# Patient Record
Sex: MALE | Race: OTHER | NOT HISPANIC OR LATINO | URBAN - METROPOLITAN AREA
[De-identification: names, ages, dates, MRNs, and addresses within clinical notes are randomized per-mention and may not be internally consistent; named-entity substitution may affect disease eponyms.]

---

## 2021-01-22 ENCOUNTER — EMERGENCY (EMERGENCY)
Facility: HOSPITAL | Age: 56
LOS: 1 days | Discharge: HOME | End: 2021-01-22
Attending: EMERGENCY MEDICINE
Payer: MEDICAID

## 2021-01-22 VITALS
WEIGHT: 154.98 LBS | HEIGHT: 65 IN | DIASTOLIC BLOOD PRESSURE: 83 MMHG | TEMPERATURE: 98 F | OXYGEN SATURATION: 98 % | HEART RATE: 103 BPM | RESPIRATION RATE: 16 BRPM | SYSTOLIC BLOOD PRESSURE: 150 MMHG

## 2021-01-22 LAB
ALBUMIN SERPL ELPH-MCNC: 4 G/DL — SIGNIFICANT CHANGE UP (ref 3.3–5)
ALP SERPL-CCNC: 78 U/L — SIGNIFICANT CHANGE UP (ref 40–120)
ALT FLD-CCNC: 33 U/L — SIGNIFICANT CHANGE UP (ref 10–45)
ANION GAP SERPL CALC-SCNC: 14 MMOL/L — SIGNIFICANT CHANGE UP (ref 5–17)
AST SERPL-CCNC: 15 U/L — SIGNIFICANT CHANGE UP (ref 10–40)
BASOPHILS # BLD AUTO: 0 K/UL — SIGNIFICANT CHANGE UP (ref 0–0.2)
BASOPHILS NFR BLD AUTO: 0 % — SIGNIFICANT CHANGE UP (ref 0–2)
BILIRUB SERPL-MCNC: 0.2 MG/DL — SIGNIFICANT CHANGE UP (ref 0.2–1.2)
BUN SERPL-MCNC: 12 MG/DL — SIGNIFICANT CHANGE UP (ref 7–23)
CALCIUM SERPL-MCNC: 9.5 MG/DL — SIGNIFICANT CHANGE UP (ref 8.4–10.5)
CHLORIDE SERPL-SCNC: 98 MMOL/L — SIGNIFICANT CHANGE UP (ref 96–108)
CO2 SERPL-SCNC: 22 MMOL/L — SIGNIFICANT CHANGE UP (ref 22–31)
CREAT SERPL-MCNC: 0.6 MG/DL — SIGNIFICANT CHANGE UP (ref 0.5–1.3)
EOSINOPHIL # BLD AUTO: 0.34 K/UL — SIGNIFICANT CHANGE UP (ref 0–0.5)
EOSINOPHIL NFR BLD AUTO: 2.3 % — SIGNIFICANT CHANGE UP (ref 0–6)
GLUCOSE SERPL-MCNC: 209 MG/DL — HIGH (ref 70–99)
HCT VFR BLD CALC: 44.9 % — SIGNIFICANT CHANGE UP (ref 39–50)
HGB BLD-MCNC: 15 G/DL — SIGNIFICANT CHANGE UP (ref 13–17)
LYMPHOCYTES # BLD AUTO: 25.4 % — SIGNIFICANT CHANGE UP (ref 13–44)
LYMPHOCYTES # BLD AUTO: 3.79 K/UL — HIGH (ref 1–3.3)
MANUAL SMEAR VERIFICATION: SIGNIFICANT CHANGE UP
MCHC RBC-ENTMCNC: 28.5 PG — SIGNIFICANT CHANGE UP (ref 27–34)
MCHC RBC-ENTMCNC: 33.4 GM/DL — SIGNIFICANT CHANGE UP (ref 32–36)
MCV RBC AUTO: 85.4 FL — SIGNIFICANT CHANGE UP (ref 80–100)
MONOCYTES # BLD AUTO: 0.81 K/UL — SIGNIFICANT CHANGE UP (ref 0–0.9)
MONOCYTES NFR BLD AUTO: 5.4 % — SIGNIFICANT CHANGE UP (ref 2–14)
NEUTROPHILS # BLD AUTO: 9.64 K/UL — HIGH (ref 1.8–7.4)
NEUTROPHILS NFR BLD AUTO: 64.6 % — SIGNIFICANT CHANGE UP (ref 43–77)
PLAT MORPH BLD: NORMAL — SIGNIFICANT CHANGE UP
PLATELET # BLD AUTO: 384 K/UL — SIGNIFICANT CHANGE UP (ref 150–400)
POTASSIUM SERPL-MCNC: 4.2 MMOL/L — SIGNIFICANT CHANGE UP (ref 3.5–5.3)
POTASSIUM SERPL-SCNC: 4.2 MMOL/L — SIGNIFICANT CHANGE UP (ref 3.5–5.3)
PROT SERPL-MCNC: 8.4 G/DL — HIGH (ref 6–8.3)
RBC # BLD: 5.26 M/UL — SIGNIFICANT CHANGE UP (ref 4.2–5.8)
RBC # FLD: 13.2 % — SIGNIFICANT CHANGE UP (ref 10.3–14.5)
RBC BLD AUTO: SIGNIFICANT CHANGE UP
SODIUM SERPL-SCNC: 134 MMOL/L — LOW (ref 135–145)
VARIANT LYMPHS # BLD: 2.3 % — SIGNIFICANT CHANGE UP (ref 0–6)
WBC # BLD: 14.93 K/UL — HIGH (ref 3.8–10.5)
WBC # FLD AUTO: 14.93 K/UL — HIGH (ref 3.8–10.5)

## 2021-01-22 PROCEDURE — 70450 CT HEAD/BRAIN W/O DYE: CPT | Mod: 26,MA

## 2021-01-22 PROCEDURE — 99283 EMERGENCY DEPT VISIT LOW MDM: CPT

## 2021-01-22 PROCEDURE — 99285 EMERGENCY DEPT VISIT HI MDM: CPT

## 2021-01-22 RX ORDER — METOCLOPRAMIDE HCL 10 MG
10 TABLET ORAL ONCE
Refills: 0 | Status: COMPLETED | OUTPATIENT
Start: 2021-01-22 | End: 2021-01-22

## 2021-01-22 RX ORDER — KETOROLAC TROMETHAMINE 30 MG/ML
15 SYRINGE (ML) INJECTION ONCE
Refills: 0 | Status: DISCONTINUED | OUTPATIENT
Start: 2021-01-22 | End: 2021-01-22

## 2021-01-22 RX ORDER — SODIUM CHLORIDE 9 MG/ML
1000 INJECTION, SOLUTION INTRAVENOUS ONCE
Refills: 0 | Status: COMPLETED | OUTPATIENT
Start: 2021-01-22 | End: 2021-01-22

## 2021-01-22 NOTE — ED PROVIDER NOTE - PATIENT PORTAL LINK FT
You can access the FollowMyHealth Patient Portal offered by [facility] by registering at the following website: [email or web address]. By joining [employer]’s FollowMyHealth portal, you will also be able to view your health information using other applications (apps) compatible with our system.

## 2021-01-22 NOTE — ED PROVIDER NOTE - PROGRESS NOTE DETAILS
CT showed bilat opacities possibly indicative of mastoiditis, ENT will see pt ENT saw pt ENT saw pt, want pt on abx and ENT f/u for possible mastoiditis

## 2021-01-22 NOTE — ED ADULT NURSE NOTE - CHPI ED NUR SYMPTOMS NEG
used no chills/no decreased eating/drinking/no dizziness/no fever/no nausea/no tingling/no vomiting/no weakness

## 2021-01-22 NOTE — ED PROVIDER NOTE - NS ED ROS FT
GENERAL: No fever, chills  EYES: no vision changes, no discharge.   ENT: no difficulty swallowing or speaking   CARDIAC: no chest pain/pressure, SOB, lower extremity swelling  PULMONARY: no cough, SOB  GI: no abdominal pain, n/v, +diarrhea  : no dysuria  SKIN: no rashes  NEURO: +headache, +lightheadedness, no paresthesia  MSK: No joint pain, myalgia, weakness.

## 2021-01-22 NOTE — ED PROVIDER NOTE - ATTENDING CONTRIBUTION TO CARE
Patient is a 54 yo M with no chronic medical problems here for evaluation of 2-3 weeks of persistent headache. He states he had COVID in December and has had headaches since then. He also c/o diarrhea. Denies focal weakness, fevers, chills, vomiting. The headache is not resolved with tylenol. He denies change in vision, focal weakness, difficulty walking. No family history of brain tumors or aneurysms.     VS noted  Gen. no acute distress, Non toxic   HEENT: PERRL, EOMI, mmm  Lungs: CTAB/L no C/ W /R   CVS: RRR   Abd; Soft non tender, non distended   Ext: no edema  Skin: no rash  Neuro AAOx3, CN 2-12 intact, strength 5/5 in UE/LE, gait normal, finger to nose normal, clear speech  a/p: persistent gradual intermittent headaches, unresolved by OTC medications at home. This may be residual from covid19. No fevers or neck pain to suggest meningitis. No suspicion for SAH. Will hydrate, check labs, give reglan, CT to r/o mass and reassess.   - Harpreet BRAN

## 2021-01-22 NOTE — ED ADULT NURSE NOTE - NSIMPLEMENTINTERV_GEN_ALL_ED
Implemented All Universal Safety Interventions:  [place] to call system. Call bell, personal items and telephone within reach. Instruct patient to call for assistance. Room bathroom lighting operational. Non-slip footwear when patient is off stretcher. Physically safe environment: no spills, clutter or unnecessary equipment. Stretcher in lowest position, wheels locked, appropriate side rails in place.

## 2021-01-22 NOTE — ED PROVIDER NOTE - PHYSICAL EXAMINATION
Dulce Thao MD  GENERAL: Patient awake alert NAD.  HEENT: NC/AT, Moist mucous membranes, PERRL, EOMI.  LUNGS: CTAB, no wheezes or crackles.   CARDIAC: RRR, no m/r/g.    ABDOMEN: Soft, NT, ND, No rebound, guarding. No CVA tenderness.   EXT: No edema. No calf tenderness.   MSK: No spinal tenderness, no pain with movement, no deformities.  NEURO: A&Ox3. Moving all extremities. cn 2-12 intact, cerebellar finger nose intact, 5/5 strength and sensation intact throughout.  SKIN: Warm and dry. No rash.  PSYCH: Normal affect. Dulce Thao MD  GENERAL: Patient awake alert NAD.  HEENT: NC/AT, Moist mucous membranes, PERRL, EOMI. Mild bilateral mastoid tenderness  LUNGS: CTAB, no wheezes or crackles.   CARDIAC: RRR, no m/r/g.    ABDOMEN: Soft, NT, ND, No rebound, guarding. No CVA tenderness.   EXT: No edema. No calf tenderness.   MSK: No spinal tenderness, no pain with movement, no deformities.  NEURO: A&Ox3. Moving all extremities. cn 2-12 intact, cerebellar finger nose intact, 5/5 strength and sensation intact throughout.  SKIN: Warm and dry. No rash.  PSYCH: Normal affect.

## 2021-01-22 NOTE — ED ADULT NURSE NOTE - OBJECTIVE STATEMENT
Pt is a 55y PMH DM2, HTN p/w headache, decreased appetite, and cough x 3 weeks. Pt endorses feeling as if he had covid in december. Recently tested negative but was started on abx for "wet cough." Pt reports diarrhea which which has now stopped. Neuro exam intact. Reports occasional chills. Denies SOB, N/V, numbness tingling, blurry vision, dysuria. Pt want covid antibody testing. A&Ox4, BRAMBILA, lungs clear, distal pulses intact, abdomen soft nontender, skin intact, VSS. Side rails up for safety, call bell and personal items within reach, instructed to call for assistance, verbalizes understanding. Will continue to monitor.

## 2021-01-22 NOTE — ED ADULT TRIAGE NOTE - CHIEF COMPLAINT QUOTE
headache x 2 weeks, slightly improved with Tylenol, diarrhea x 3 weeks, decreased appetite and occasional lightheadedness.  states he thinks he had covid in December and tested negative one week ago

## 2021-01-22 NOTE — ED PROVIDER NOTE - CARE PROVIDER_API CALL
Michelle Kinney (MD)  [facility]  [address] [place] [address] [address]  [place] [address]  Phone: [phone]  Fax: [phone]  Follow Up Time:

## 2021-01-22 NOTE — ED PROVIDER NOTE - OBJECTIVE STATEMENT
pt is a 56yo M who presents with headache. pt had COVID in december, most symptoms resolved except for headache and diarrhea for past 2-3 weeks. No neuro deficits, no changes in vision, no issues walking or talking. Endorses headache worst at back of head, constantly there despite tylenol

## 2021-01-22 NOTE — ED PROVIDER NOTE - NSFOLLOWUPINSTRUCTIONS_ED_ALL_ED_FT
You were seen in the ED for headache.     For pain, please take 650mg Tylenol every 6 hours as needed or 600mg ibuprofen every 8 hours as needed. Always take ibuprofen with food. You make take both Tylenol and ibuprofen as described above if one medication is not enough for your pain.    Please follow up with your primary care doctor in 2-3 days. Return to the ED if you experience any worsening or new symptoms or any symptoms that concern you, including fevers, chills, shortness of breath, chest pain You were seen in the ED for headache.   Your head CT showed signs suspicious of mastoiditis which is an infection behind your ear.     Take Augmentin 1 tab twice a day for 10 days. This is an antibiotic.    Follow up with the ears-nose-throat doctor in clinic in 10 days. Call to make an appointment.    For pain, please take 650mg Tylenol every 6 hours as needed or 600mg ibuprofen every 8 hours as needed. Always take ibuprofen with food. You make take both Tylenol and ibuprofen as described above if one medication is not enough for your pain.    Please follow up with your primary care doctor. Return to the ED if you experience any worsening or new symptoms or any symptoms that concern you, including fevers, chills, shortness of breath, chest pain, worsening headache, vision loss, hearing loss.

## 2021-01-22 NOTE — ED PROVIDER NOTE - CLINICAL SUMMARY MEDICAL DECISION MAKING FREE TEXT BOX
Master: pt is a 54yo M who presents with headache. Will get CT head to r/o hemorrhage or mass. Will check cbc and cmp for electrolyte imbalances. Will give reglan and LR and toradol Master: pt is a 54yo M who presents with headache. Will get CT head to r/o hemorrhage or mass. Will check cbc and cmp for electrolyte imbalances. Will give reglan and LR and Toradol pt is a 56yo M who presents with headache. Will get CT head to r/o mass. Will check cbc and cmp for electrolyte imbalances. Will give reglan and LR and Toradol    CT showed bilat opacities possibly indicative of mastoiditis, ENT saw patient, recommended Abd and outpatient follow up.

## 2021-01-23 VITALS
DIASTOLIC BLOOD PRESSURE: 62 MMHG | TEMPERATURE: 99 F | OXYGEN SATURATION: 99 % | HEART RATE: 78 BPM | RESPIRATION RATE: 18 BRPM | SYSTOLIC BLOOD PRESSURE: 117 MMHG

## 2021-01-23 DIAGNOSIS — H70.90 UNSPECIFIED MASTOIDITIS, UNSPECIFIED EAR: ICD-10-CM

## 2021-01-23 LAB
APPEARANCE UR: CLEAR — SIGNIFICANT CHANGE UP
BACTERIA # UR AUTO: 0 — SIGNIFICANT CHANGE UP
BILIRUB UR-MCNC: NEGATIVE — SIGNIFICANT CHANGE UP
COLOR SPEC: YELLOW — SIGNIFICANT CHANGE UP
DIFF PNL FLD: NEGATIVE — SIGNIFICANT CHANGE UP
EPI CELLS # UR: 0 /HPF — SIGNIFICANT CHANGE UP
GLUCOSE UR QL: NEGATIVE — SIGNIFICANT CHANGE UP
HYALINE CASTS # UR AUTO: 1 /LPF — SIGNIFICANT CHANGE UP (ref 0–2)
KETONES UR-MCNC: NEGATIVE — SIGNIFICANT CHANGE UP
LEUKOCYTE ESTERASE UR-ACNC: NEGATIVE — SIGNIFICANT CHANGE UP
NITRITE UR-MCNC: NEGATIVE — SIGNIFICANT CHANGE UP
PH UR: 6 — SIGNIFICANT CHANGE UP (ref 5–8)
PROT UR-MCNC: SIGNIFICANT CHANGE UP
RBC CASTS # UR COMP ASSIST: 2 /HPF — SIGNIFICANT CHANGE UP (ref 0–4)
SARS-COV-2 IGG SERPL QL IA: POSITIVE
SARS-COV-2 IGM SERPL IA-ACNC: 53.8 INDEX — HIGH
SP GR SPEC: 1.02 — SIGNIFICANT CHANGE UP (ref 1.01–1.02)
UROBILINOGEN FLD QL: NEGATIVE — SIGNIFICANT CHANGE UP
WBC UR QL: 1 /HPF — SIGNIFICANT CHANGE UP (ref 0–5)

## 2021-01-23 PROCEDURE — 87086 URINE CULTURE/COLONY COUNT: CPT

## 2021-01-23 PROCEDURE — 96375 TX/PRO/DX INJ NEW DRUG ADDON: CPT

## 2021-01-23 PROCEDURE — 86769 SARS-COV-2 COVID-19 ANTIBODY: CPT

## 2021-01-23 PROCEDURE — 81001 URINALYSIS AUTO W/SCOPE: CPT

## 2021-01-23 PROCEDURE — 99284 EMERGENCY DEPT VISIT MOD MDM: CPT | Mod: 25

## 2021-01-23 PROCEDURE — 80053 COMPREHEN METABOLIC PANEL: CPT

## 2021-01-23 PROCEDURE — 85025 COMPLETE CBC W/AUTO DIFF WBC: CPT

## 2021-01-23 PROCEDURE — 96374 THER/PROPH/DIAG INJ IV PUSH: CPT

## 2021-01-23 PROCEDURE — 70450 CT HEAD/BRAIN W/O DYE: CPT

## 2021-01-23 RX ADMIN — SODIUM CHLORIDE 1000 MILLILITER(S): 9 INJECTION, SOLUTION INTRAVENOUS at 01:01

## 2021-01-23 RX ADMIN — Medication 10 MILLIGRAM(S): at 01:01

## 2021-01-23 RX ADMIN — Medication 15 MILLIGRAM(S): at 01:07

## 2021-01-23 NOTE — CONSULT NOTE ADULT - PROBLEM SELECTOR RECOMMENDATION 9
- CT Head: Nonspecific bilateral mastoid opacification.   - Augmentin 875mg PO BID x 7-10days.   - Pain meds prn.   - F/u ENT clinic on discharge.    [address] [facility], [address]    [place], [place]- [address] [phone]  - Call ENT with questions.     # [id number]  ENT PA. - CT Head: Nonspecific bilateral mastoid opacification.   - Augmentin 875mg PO BID x 7-10days.   - Pain meds prn.   - F/u ENT clinic on discharge.    [address] [facility], [address]    [place]- [address] [phone]  - Call ENT with questions.     # [id number]  ENT PA.

## 2021-01-23 NOTE — CONSULT NOTE ADULT - ASSESSMENT
56 YO M with PMH of DM on Metformin, Depression presents to [facility] ED for evaluation of Headache x 2-3 weeks. Per pt, noted to have HA mostly on the back, constant doesn't relieve with Tylenol PO. Admits to having chills. In ED, No fevers, WBC: 14.93. CT of Head showed, Nonspecific bilateral mastoid opacification. Received Toradol x1 and pain improved per pt.

## 2021-01-23 NOTE — CONSULT NOTE ADULT - SUBJECTIVE AND OBJECTIVE BOX
CC: B/l Mastoiditis    HPI: 56 YO M with PMH of DM on Metformin, Depression presents to [facility] ED for evaluation of Headache x 2-3 weeks. Per pt, noted to have HA mostly on the back, constant doesn't relieve with Tylenol PO. Pt had COVID in December most symptoms resolved except for HA and Diarrhea for last 2-3 weeks. Admits to having chills. Denies ear pain, hearing loss, nystagmus vertigo, Dizziness/Blurry vision/syncope, fever, N/V, Abdominal Pain, Recent URI, rhinorrhea sore throat.     In ED, No fevers, WBC: 14.93. CT of Head showed, Nonspecific bilateral mastoid opacification. Received Toradol x1 and pain improved per pt.     PAST MEDICAL & SURGICAL HISTORY:  No pertinent past medical history  No significant past surgical history    Allergies.  No Known Allergies    Intolerances.  MEDICATIONS  (STANDING):.  MEDICATIONS  (PRN):.    Social History:   Family history: None.   ROS:   ENT: all negative except as noted in HPI.   CV: denies palpitations  Pulm: denies SOB, cough, hemoptysis  GI: denies change in appetite indigestion, n/v  : denies pertinent urinary symptoms, urgency  Neuro: denies numbness/tingling, loss of sensation  Psych: denies anxiety  MS: denies muscle weakness, instability  Heme: denies easy bruising or bleeding  Endo: denies heat/cold intolerance, excessive sweating  Vascular: denies LE edema    Vital Signs Last 24 Hrs  T(C): 37 (23 Jan 2021 02:08), Max: 37 (23 Jan 2021 02:08)  T(F): 98.6 (23 Jan 2021 02:08), Max: 98.6 (23 Jan 2021 02:08)  HR: 78 (23 Jan 2021 02:08) (78 - 103)  BP: 117/62 (23 Jan 2021 02:08) (117/62 - 150/83)  BP(mean): --  RR: 18 (23 Jan 2021 02:08) (16 - 18)  SpO2: 99% (23 Jan 2021 02:08) (97% - 99%)                        15.0   14.93 )-----------( 384      ( 22 Jan 2021 22:42 )             44.9    01-22    134<L>  |  98  |  12  ----------------------------<  209<H>  4.2   |  22  |  0.60    Ca    9.5      22 Jan 2021 22:42    TPro  8.4<H>  /  Alb  4.0  /  TBili  0.2  /  DBili  x   /  AST  15  /  ALT  33  /  AlkPhos  78  01-22     PHYSICAL EXAM:  Gen: NAD, On RA.   Skin: No rashes, bruises, or lesions  Head: Normocephalic, Atraumatic  Face: no edema, erythema, or fluctuance. Parotid glands soft without mass  Eyes: no scleral injection  Ears: Right - Cerumen in EAC, TM intact without effusion or erythema. No evidence of any fluid drainage. No mastoid tenderness, erythema, or ear bulging            Left - Cerumen in EAC, TM intact without effusion or erythema. No evidence of any fluid drainage. No mastoid tenderness, erythema, or ear bulging  Nose: Nares bilaterally patent, no discharge  Mouth: No Stridor / Drooling / Trismus.  Mucosa moist, tongue/uvula midline, oropharynx clear  Neck: Flat, supple, no lymphadenopathy, trachea midline, no masses  Lymphatic: No lymphadenopathy  Resp: breathing easily, no stridor  CV: no peripheral edema/cyanosis  GI: nondistended   Peripheral vascular: no JVD or edema  Neuro: facial nerve intact, no facial droop    IMAGING/ADDITIONAL STUDIES:   CT Head: IMPRESSION:  No acute intracranial hemorrhage, large cortical infarct or mass effect. If clinically indicated, short-term follow-up or MRI may be obtained for further evaluation.  Nonspecific bilateral mastoid opacification. Recommend clinical correlation to assess acute mastoiditis. CC: B/l Mastoiditis.    HPI: 56 YO M with PMH of DM on Metformin, Depression presents to [facility] ED for evaluation of Headache x 2-3 weeks. Per pt, noted to have HA mostly on the back, constant doesn't relieve with Tylenol PO. Pt had COVID in December most symptoms resolved except for HA and Diarrhea for last 2-3 weeks. Admits to having chills. Denies ear pain, hearing loss, nystagmus vertigo, Dizziness/Blurry vision/syncope, fever, N/V, Abdominal Pain, Recent URI, rhinorrhea, sore throat.     In ED, No fevers, WBC: 14.93. CT of Head showed, Nonspecific bilateral mastoid opacification. Received Toradol x1 and pain improved per pt.     PAST MEDICAL & SURGICAL HISTORY:  No pertinent past medical history  No significant past surgical history    Allergies.  No Known Allergies    Intolerances.  MEDICATIONS  (STANDING):.  MEDICATIONS  (PRN):.    Social History:   Family history: None.   ROS:   ENT: all negative except as noted in HPI.   CV: denies palpitations  Pulm: denies SOB, cough, hemoptysis  GI: denies change in appetite indigestion, n/v  : denies pertinent urinary symptoms, urgency  Neuro: denies numbness/tingling, loss of sensation  Psych: denies anxiety  MS: denies muscle weakness, instability  Heme: denies easy bruising or bleeding  Endo: denies heat/cold intolerance, excessive sweating  Vascular: denies LE edema    Vital Signs Last 24 Hrs  T(C): 37 (23 Jan 2021 02:08), Max: 37 (23 Jan 2021 02:08)  T(F): 98.6 (23 Jan 2021 02:08), Max: 98.6 (23 Jan 2021 02:08)  HR: 78 (23 Jan 2021 02:08) (78 - 103)  BP: 117/62 (23 Jan 2021 02:08) (117/62 - 150/83)  BP(mean): --  RR: 18 (23 Jan 2021 02:08) (16 - 18)  SpO2: 99% (23 Jan 2021 02:08) (97% - 99%)                        15.0   14.93 )-----------( 384      ( 22 Jan 2021 22:42 )             44.9    01-22    134<L>  |  98  |  12  ----------------------------<  209<H>  4.2   |  22  |  0.60    Ca    9.5      22 Jan 2021 22:42    TPro  8.4<H>  /  Alb  4.0  /  TBili  0.2  /  DBili  x   /  AST  15  /  ALT  33  /  AlkPhos  78  01-22     PHYSICAL EXAM:  Gen: NAD, On RA.   Skin: No rashes, bruises, or lesions  Head: Normocephalic, Atraumatic  Face: no edema, erythema, or fluctuance. Parotid glands soft without mass  Eyes: no scleral injection  Ears: Right - Cerumen in EAC, TM intact without effusion or erythema. No evidence of any fluid drainage. No mastoid tenderness, erythema, or ear bulging            Left - Cerumen in EAC, TM intact without effusion or erythema. No evidence of any fluid drainage. No mastoid tenderness, erythema, or ear bulging  Nose: Nares bilaterally patent, no discharge  Mouth: No Stridor / Drooling / Trismus.  Mucosa moist, tongue/uvula midline, oropharynx clear  Neck: Flat, supple, no lymphadenopathy, trachea midline, no masses  Lymphatic: No lymphadenopathy  Resp: breathing easily, no stridor  CV: no peripheral edema/cyanosis  GI: nondistended   Peripheral vascular: no JVD or edema  Neuro: facial nerve intact, no facial droop    IMAGING/ADDITIONAL STUDIES:   CT Head: IMPRESSION:  No acute intracranial hemorrhage, large cortical infarct or mass effect. If clinically indicated, short-term follow-up or MRI may be obtained for further evaluation.  Nonspecific bilateral mastoid opacification. Recommend clinical correlation to assess acute mastoiditis.

## 2021-01-24 LAB
CULTURE RESULTS: SIGNIFICANT CHANGE UP
SPECIMEN SOURCE: SIGNIFICANT CHANGE UP

## 2021-02-04 ENCOUNTER — INPATIENT (INPATIENT)
Facility: HOSPITAL | Age: 56
LOS: 6 days | Discharge: HOME HEALTH | DRG: 638 | End: 2021-02-11
Attending: INTERNAL MEDICINE | Admitting: INTERNAL MEDICINE
Payer: MEDICAID

## 2021-02-04 VITALS
WEIGHT: 154.98 LBS | HEIGHT: 65 IN | SYSTOLIC BLOOD PRESSURE: 163 MMHG | HEART RATE: 88 BPM | TEMPERATURE: 98 F | RESPIRATION RATE: 16 BRPM | OXYGEN SATURATION: 100 % | DIASTOLIC BLOOD PRESSURE: 96 MMHG

## 2021-02-04 LAB
ALBUMIN SERPL ELPH-MCNC: 3.3 G/DL — SIGNIFICANT CHANGE UP (ref 3.3–5)
ALP SERPL-CCNC: 196 U/L — HIGH (ref 40–120)
ALT FLD-CCNC: 214 U/L — HIGH (ref 10–45)
ANION GAP SERPL CALC-SCNC: 10 MMOL/L — SIGNIFICANT CHANGE UP (ref 5–17)
AST SERPL-CCNC: 322 U/L — HIGH (ref 10–40)
BASOPHILS # BLD AUTO: 0.12 K/UL — SIGNIFICANT CHANGE UP (ref 0–0.2)
BASOPHILS NFR BLD AUTO: 0.9 % — SIGNIFICANT CHANGE UP (ref 0–2)
BILIRUB SERPL-MCNC: 0.4 MG/DL — SIGNIFICANT CHANGE UP (ref 0.2–1.2)
BUN SERPL-MCNC: 16 MG/DL — SIGNIFICANT CHANGE UP (ref 7–23)
CALCIUM SERPL-MCNC: 8.9 MG/DL — SIGNIFICANT CHANGE UP (ref 8.4–10.5)
CHLORIDE SERPL-SCNC: 101 MMOL/L — SIGNIFICANT CHANGE UP (ref 96–108)
CO2 SERPL-SCNC: 26 MMOL/L — SIGNIFICANT CHANGE UP (ref 22–31)
CREAT SERPL-MCNC: 0.61 MG/DL — SIGNIFICANT CHANGE UP (ref 0.5–1.3)
EOSINOPHIL # BLD AUTO: 0.12 K/UL — SIGNIFICANT CHANGE UP (ref 0–0.5)
EOSINOPHIL NFR BLD AUTO: 0.9 % — SIGNIFICANT CHANGE UP (ref 0–6)
GIANT PLATELETS BLD QL SMEAR: PRESENT — SIGNIFICANT CHANGE UP
GLUCOSE SERPL-MCNC: 148 MG/DL — HIGH (ref 70–99)
HCT VFR BLD CALC: 40.9 % — SIGNIFICANT CHANGE UP (ref 39–50)
HGB BLD-MCNC: 13.4 G/DL — SIGNIFICANT CHANGE UP (ref 13–17)
LYMPHOCYTES # BLD AUTO: 28.7 % — SIGNIFICANT CHANGE UP (ref 13–44)
LYMPHOCYTES # BLD AUTO: 3.94 K/UL — HIGH (ref 1–3.3)
MANUAL SMEAR VERIFICATION: SIGNIFICANT CHANGE UP
MCHC RBC-ENTMCNC: 28.4 PG — SIGNIFICANT CHANGE UP (ref 27–34)
MCHC RBC-ENTMCNC: 32.8 GM/DL — SIGNIFICANT CHANGE UP (ref 32–36)
MCV RBC AUTO: 86.7 FL — SIGNIFICANT CHANGE UP (ref 80–100)
MONOCYTES # BLD AUTO: 0.59 K/UL — SIGNIFICANT CHANGE UP (ref 0–0.9)
MONOCYTES NFR BLD AUTO: 4.3 % — SIGNIFICANT CHANGE UP (ref 2–14)
NEUTROPHILS # BLD AUTO: 8.83 K/UL — HIGH (ref 1.8–7.4)
NEUTROPHILS NFR BLD AUTO: 64.3 % — SIGNIFICANT CHANGE UP (ref 43–77)
PLAT MORPH BLD: NORMAL — SIGNIFICANT CHANGE UP
PLATELET # BLD AUTO: 375 K/UL — SIGNIFICANT CHANGE UP (ref 150–400)
POTASSIUM SERPL-MCNC: 3.8 MMOL/L — SIGNIFICANT CHANGE UP (ref 3.5–5.3)
POTASSIUM SERPL-SCNC: 3.8 MMOL/L — SIGNIFICANT CHANGE UP (ref 3.5–5.3)
PROT SERPL-MCNC: 6.2 G/DL — SIGNIFICANT CHANGE UP (ref 6–8.3)
RBC # BLD: 4.72 M/UL — SIGNIFICANT CHANGE UP (ref 4.2–5.8)
RBC # FLD: 13.9 % — SIGNIFICANT CHANGE UP (ref 10.3–14.5)
RBC BLD AUTO: SIGNIFICANT CHANGE UP
SODIUM SERPL-SCNC: 137 MMOL/L — SIGNIFICANT CHANGE UP (ref 135–145)
VARIANT LYMPHS # BLD: 0.9 % — SIGNIFICANT CHANGE UP (ref 0–6)
WBC # BLD: 13.74 K/UL — HIGH (ref 3.8–10.5)
WBC # FLD AUTO: 13.74 K/UL — HIGH (ref 3.8–10.5)

## 2021-02-04 RX ORDER — IBUPROFEN 200 MG
600 TABLET ORAL ONCE
Refills: 0 | Status: COMPLETED | OUTPATIENT
Start: 2021-02-04 | End: 2021-02-04

## 2021-02-04 RX ADMIN — Medication 600 MILLIGRAM(S): at 21:23

## 2021-02-04 NOTE — ED PROVIDER NOTE - PHYSICAL EXAMINATION
Vital signs reviewed  GENERAL: Patient nontoxic appearing, NAD  HEAD: NCAT  EYES: Anicteric  ENT: +left TM erythema. +ttp over left mastoid, left tragus extending towards cheek and neck. no uvular swelling.   NECK: Supple, non tender  RESPIRATORY: Normal respiratory effort. CTA B/L. No wheezing, rales, rhonchi  CARDIOVASCULAR: Regular rate and rhythm  ABDOMEN: Soft. Nondistended. Nontender. No guarding or rebound. No CVA tenderness.  MUSCULOSKELETAL/EXTREMITIES: Brisk cap refill. 2+ radial pulses. No leg edema.  SKIN:  Warm and dry  NEURO: AAOx3. No gross FND.  PSYCHIATRIC: Cooperative. Affect appropriate. Vital signs reviewed  GENERAL: Patient nontoxic appearing, NAD  HEAD: NCAT  EYES: Anicteric  ENT: +left TM erythema. +ttp over left mastoid, left tragus extending towards cheek and neck. no uvular swelling.   NECK: Supple, non tender  RESPIRATORY: Normal respiratory effort. CTA B/L. No wheezing, rales, rhonchi  CARDIOVASCULAR: Regular rate and rhythm  ABDOMEN: Soft. Nondistended. Nontender. No guarding or rebound. No CVA tenderness.  MUSCULOSKELETAL/EXTREMITIES: Brisk cap refill. 2+ radial pulses. No leg edema.  SKIN:  Warm and dry  NEURO: AAOx3. No gross FND.  PSYCHIATRIC: Cooperative. Affect appropriate.  Attending Halley Benavides: Gen: NAD, heent: atrauamtic, eomi, perrla, mild left tonsilar erythema, ttp left temporal area, handling secreations, no trismus, uvula midline, neck; nttp,left cervical adenopathy, chest: nttp, no crepitus, cv: rrr, no murmurs, lungs: ctab, abd: soft, nontender, nondistended, no peritoneal signs, +BS, no guarding, ext: wwp, neg homans, skin: no rash, neuro: awake and alert, following commands, speech clear, sensation and strength intact, no focal deficits

## 2021-02-04 NOTE — ED PROVIDER NOTE - ATTENDING CONTRIBUTION TO CARE
Attending MD Halley Benavides:  I personally have seen and examined this patient.  Resident note reviewed and agree on plan of care and except where noted.  See HPI, PE, and MDM for details.

## 2021-02-04 NOTE — ED PROVIDER NOTE - OBJECTIVE STATEMENT
55 year old M presents to ED c/o worsening  L sided headache radiating to neck and ear. Pt was seen here 2 weeks ago and found to have mastoiditis on CT and DC'd home with Augmentin for 10 days and f/u with ENT next Monday but pain worsened and pt couldn't wait until then. Pt states he completed Abx course. Patient has been taking tylenol and toradol everyday without pain relief.   pt denies any fever, chills, dizziness, cp, cough, sob, abdominal pain, n/v/d, discharge from ear, trouble opening his mouth

## 2021-02-04 NOTE — ED PROVIDER NOTE - RAPID ASSESSMENT
55 year old M presents to ED c/o worsening  L sided headache radiating to neck and ear. Pt was seen here 2 weeks ago and found to have mastoiditis on CT and DC'd home with Augmentin and f/u with ENT next Monday but pain worsened and pt couldn't wait until then. Pt states he completed Abx course. Took Toradol and 3 extra strength Tylenol with no improvement.     Patient was seen as a tele QDOC patient. The patient will be seen and further worked up in the main emergency department and their care will be completed by the main emergency department team along with a thorough physical exam. Receiving team will follow up on labs, analgesia, any clinical imaging, reassess and disposition as clinically indicated, all decisions regarding the progression of care will be made at their discretion.     Scribe Statement: ANTHONY, Darnell Oakes, attest that this documentation has been prepared under the direction and in the presence of Anastasia Minor (PA) 55 year old M presents to ED c/o worsening  L sided headache radiating to neck and ear. Pt was seen here 2 weeks ago and found to have mastoiditis on CT and DC'd home with Augmentin and f/u with ENT next Monday but pain worsened and pt couldn't wait until then. Pt states he completed Abx course. Took Toradol and 3 extra strength Tylenol with no improvement.     Patient was seen as a tele QDOC patient. The patient will be seen and further worked up in the main emergency department and their care will be completed by the main emergency department team along with a thorough physical exam. Receiving team will follow up on labs, analgesia, any clinical imaging, reassess and disposition as clinically indicated, all decisions regarding the progression of care will be made at their discretion.     Scribe Statement: I, Darnell Oakes, attest that this documentation has been prepared under the direction and in the presence of Anastasia Minor (PA)    Rapid assessment by Anastasia Minor PA-C full eval to be performed in ED. Above documentation completed by scribe above. I was present for and agree with documentation.   Anastasia Minor PA-C

## 2021-02-04 NOTE — ED PROVIDER NOTE - CLINICAL SUMMARY MEDICAL DECISION MAKING FREE TEXT BOX
55 year old M presents to ED c/o worsening  L sided headache radiating to neck and ear. Pt was seen here 2 weeks ago and found to have mastoiditis on CT and DC'd home with Augmentin for 10 days and f/u with ENT next Monday but pain worsened and pt couldn't wait until then. vitals wnl, on exam patient has pain over left temporal area and around ear. no ttp over left mastoid or tragus. left OM erythematous. will assess for mastoiditis vs trigeminal neuralgia vs osteomyelitis. will treat pain and draw labs and reassess with ct. 55 year old M presents to ED c/o worsening  L sided headache radiating to neck and ear. Pt was seen here 2 weeks ago and found to have mastoiditis on CT and DC'd home with Augmentin for 10 days and f/u with ENT next Monday but pain worsened and pt couldn't wait until then. vitals wnl, on exam patient has pain over left temporal area and around ear. no ttp over left mastoid or tragus. left OM erythematous. will assess for mastoiditis vs trigeminal neuralgia vs osteomyelitis. will treat pain and draw labs and reassess with ct.  Attending Halley Benavides: 54 y/o male recently seen in the ed for headache with ct scans showing evidence of mastoid cell opacification and treated for possible mastoiditis on augment. pt reports no sig improvement and reports increased pain to that area. no jaw pain. does endorse a sore throat as well. upon arrival pt hemodynamically stable. clinically no evidence on phsyical exam of mastoiditis. no jaw ttp. consider possible trigeminal neuralgia vs temporal arteritis. no evidence aducens palsy and pt well appearing making central venous abscess less likely. no vision changes. will obtain ct scans to further evalaute, check inflammatory markers and re-eval. will pain control. 55 year old M presents to ED c/o worsening  L sided headache radiating to neck and ear. Pt was seen here 2 weeks ago and found to have mastoiditis on CT and DC'd home with Augmentin for 10 days and f/u with ENT next Monday but pain worsened and pt couldn't wait until then. vitals wnl, on exam patient has pain over left temporal area and around ear. no ttp over left mastoid or tragus. left OM erythematous. will assess for mastoiditis vs trigeminal neuralgia vs osteomyelitis. will treat pain and draw labs and reassess with ct.  Attending Halley Benavides: 56 y/o male recently seen in the ed for headache with ct scans showing evidence of mastoid cell opacification and treated for possible mastoiditis on augment. pt reports no sig improvement and reports increased pain to that area. no jaw pain. does endorse a sore throat as well. upon arrival pt hemodynamically stable. clinically no evidence on phsyical exam of mastoiditis. no jaw ttp. consider possible trigeminal neuralgia vs temporal arteritis. additoinally pt with h/o DM ,does report some hearing changes, pt previusly seen by ENT. consider possible fungal infection althgouh less liekly. no evidence aducens palsy and pt well appearing making central venous abscess less likely. no vision changes. will obtain ct scans to further evalaute, check inflammatory markers andlikely dw neuro vs ent.  re-eval. will pain control. 55 year old M presents to ED c/o worsening  L sided headache radiating to neck and ear. Pt was seen here 2 weeks ago and found to have mastoiditis on CT and DC'd home with Augmentin for 10 days and f/u with ENT next Monday but pain worsened and pt couldn't wait until then. vitals wnl, on exam patient has pain over left temporal area and around ear. no ttp over left mastoid or tragus. left OM erythematous. will assess for mastoiditis vs trigeminal neuralgia vs osteomyelitis. will treat pain and draw labs and reassess with ct.  Attending Halley Benavides: 56 y/o male recently seen in the ed for headache with ct scans showing evidence of mastoid cell opacification and treated for possible mastoiditis on augment. pt reports no sig improvement and reports increased pain to that area. no jaw pain. does endorse a sore throat as well. upon arrival pt hemodynamically stable. clinically no evidence on phsyical exam of mastoiditis. no jaw ttp. consider possible trigeminal neuralgia vs temporal arteritis. additoinally pt with h/o DM ,does report some hearing changes, pt previusly seen by ENT. consider possible fungal infection althgouh less liekly vs spreading infection from prior eat infection. no evidence aducens palsy and pt well appearing making central venous abscess less likely. no vision changes. will obtain ct scans if unremarkable consider MRI to further evaluate. check inflammatory markers andlikely dw neuro and ent.  re-eval. will pain control.

## 2021-02-04 NOTE — ED PROVIDER NOTE - PROGRESS NOTE DETAILS
Mahad Villatoro PGY-1 patient continues to be in pain. endorsing pain over left temporal pain. needing multiple doses of pain meds. will consult neurology for further recs. Attending Halley Benavides: d/w neurology as concern for possible temporal arterities with persistent pain and temporal ttp. ct scans negative for acute process. clinical exam atypical for gely. will d/w neuro. awaiting esr. no visoin changes Resident Alvino: pt received as a sign out at 7a. Pending neurology recommendations at that time - IV steroids were ordered. Neurology called and recommended oral prednisone and then advised to hold off for now. Neurology resident called back with recommendations at 14:00 - awaiting note. Team came to see the patietn with attending at 14:20 and the patient was not in the ED - called the pt states he went to his car. Asked to come to the ED immediately. Pt is back now. Resident Hersimran: CTA/CTV ordered and pending (CT tech is called - has two more patients). ENT consulted as per neuro recs - note reviewed - ordered drops. Spoke with CDU team - accepted the patient.

## 2021-02-05 DIAGNOSIS — H92.02 OTALGIA, LEFT EAR: ICD-10-CM

## 2021-02-05 LAB
ALBUMIN SERPL ELPH-MCNC: 3.6 G/DL — SIGNIFICANT CHANGE UP (ref 3.3–5)
ALP SERPL-CCNC: 61 U/L — SIGNIFICANT CHANGE UP (ref 40–120)
ALT FLD-CCNC: 28 U/L — SIGNIFICANT CHANGE UP (ref 10–45)
ANION GAP SERPL CALC-SCNC: 10 MMOL/L — SIGNIFICANT CHANGE UP (ref 5–17)
APAP SERPL-MCNC: <15 UG/ML — SIGNIFICANT CHANGE UP (ref 10–30)
AST SERPL-CCNC: 18 U/L — SIGNIFICANT CHANGE UP (ref 10–40)
BASE EXCESS BLDV CALC-SCNC: -0.8 MMOL/L — SIGNIFICANT CHANGE UP (ref -2–2)
BASE EXCESS BLDV CALC-SCNC: -1 MMOL/L — SIGNIFICANT CHANGE UP (ref -2–2)
BILIRUB SERPL-MCNC: 0.2 MG/DL — SIGNIFICANT CHANGE UP (ref 0.2–1.2)
BUN SERPL-MCNC: 7 MG/DL — SIGNIFICANT CHANGE UP (ref 7–23)
CA-I SERPL-SCNC: 1.18 MMOL/L — SIGNIFICANT CHANGE UP (ref 1.12–1.3)
CA-I SERPL-SCNC: 1.21 MMOL/L — SIGNIFICANT CHANGE UP (ref 1.12–1.3)
CALCIUM SERPL-MCNC: 9.2 MG/DL — SIGNIFICANT CHANGE UP (ref 8.4–10.5)
CHLORIDE BLDV-SCNC: 108 MMOL/L — SIGNIFICANT CHANGE UP (ref 96–108)
CHLORIDE BLDV-SCNC: 109 MMOL/L — HIGH (ref 96–108)
CHLORIDE SERPL-SCNC: 106 MMOL/L — SIGNIFICANT CHANGE UP (ref 96–108)
CK SERPL-CCNC: 64 U/L — SIGNIFICANT CHANGE UP (ref 30–200)
CO2 BLDV-SCNC: 26 MMOL/L — SIGNIFICANT CHANGE UP (ref 22–30)
CO2 BLDV-SCNC: 26 MMOL/L — SIGNIFICANT CHANGE UP (ref 22–30)
CO2 SERPL-SCNC: 24 MMOL/L — SIGNIFICANT CHANGE UP (ref 22–31)
CREAT SERPL-MCNC: 0.64 MG/DL — SIGNIFICANT CHANGE UP (ref 0.5–1.3)
ETHANOL SERPL-MCNC: SIGNIFICANT CHANGE UP MG/DL (ref 0–10)
GAS PNL BLDV: 136 MMOL/L — SIGNIFICANT CHANGE UP (ref 135–145)
GAS PNL BLDV: 137 MMOL/L — SIGNIFICANT CHANGE UP (ref 135–145)
GAS PNL BLDV: SIGNIFICANT CHANGE UP
GLUCOSE BLDV-MCNC: 108 MG/DL — HIGH (ref 70–99)
GLUCOSE BLDV-MCNC: 128 MG/DL — HIGH (ref 70–99)
GLUCOSE SERPL-MCNC: 111 MG/DL — HIGH (ref 70–99)
HCO3 BLDV-SCNC: 24 MMOL/L — SIGNIFICANT CHANGE UP (ref 21–29)
HCO3 BLDV-SCNC: 25 MMOL/L — SIGNIFICANT CHANGE UP (ref 21–29)
HCT VFR BLDA CALC: 40 % — SIGNIFICANT CHANGE UP (ref 39–50)
HCT VFR BLDA CALC: 46 % — SIGNIFICANT CHANGE UP (ref 39–50)
HGB BLD CALC-MCNC: 13.1 G/DL — SIGNIFICANT CHANGE UP (ref 13–17)
HGB BLD CALC-MCNC: 15 G/DL — SIGNIFICANT CHANGE UP (ref 13–17)
HIV 1 & 2 AB SERPL IA.RAPID: SIGNIFICANT CHANGE UP
LACTATE BLDV-MCNC: 2.1 MMOL/L — HIGH (ref 0.7–2)
LACTATE BLDV-MCNC: 2.5 MMOL/L — HIGH (ref 0.7–2)
LIDOCAIN IGE QN: 27 U/L — SIGNIFICANT CHANGE UP (ref 7–60)
OTHER CELLS CSF MANUAL: 10 ML/DL — LOW (ref 18–22)
OTHER CELLS CSF MANUAL: 11 ML/DL — LOW (ref 18–22)
PCO2 BLDV: 45 MMHG — SIGNIFICANT CHANGE UP (ref 35–50)
PCO2 BLDV: 47 MMHG — SIGNIFICANT CHANGE UP (ref 35–50)
PH BLDV: 7.34 — LOW (ref 7.35–7.45)
PH BLDV: 7.35 — SIGNIFICANT CHANGE UP (ref 7.35–7.45)
PO2 BLDV: 29 MMHG — SIGNIFICANT CHANGE UP (ref 25–45)
PO2 BLDV: 31 MMHG — SIGNIFICANT CHANGE UP (ref 25–45)
POTASSIUM BLDV-SCNC: 4 MMOL/L — SIGNIFICANT CHANGE UP (ref 3.5–5.3)
POTASSIUM BLDV-SCNC: 4.3 MMOL/L — SIGNIFICANT CHANGE UP (ref 3.5–5.3)
POTASSIUM SERPL-MCNC: 4.3 MMOL/L — SIGNIFICANT CHANGE UP (ref 3.5–5.3)
POTASSIUM SERPL-SCNC: 4.3 MMOL/L — SIGNIFICANT CHANGE UP (ref 3.5–5.3)
PROT SERPL-MCNC: 7.1 G/DL — SIGNIFICANT CHANGE UP (ref 6–8.3)
RAPID RVP RESULT: SIGNIFICANT CHANGE UP
S PYO AG SPEC QL IA: NEGATIVE — SIGNIFICANT CHANGE UP
SALICYLATES SERPL-MCNC: <2 MG/DL — LOW (ref 15–30)
SAO2 % BLDV: 54 % — LOW (ref 67–88)
SAO2 % BLDV: 57 % — LOW (ref 67–88)
SARS-COV-2 RNA SPEC QL NAA+PROBE: SIGNIFICANT CHANGE UP
SODIUM SERPL-SCNC: 140 MMOL/L — SIGNIFICANT CHANGE UP (ref 135–145)

## 2021-02-05 PROCEDURE — 99218: CPT

## 2021-02-05 PROCEDURE — 70496 CT ANGIOGRAPHY HEAD: CPT | Mod: 26,MG

## 2021-02-05 PROCEDURE — 70460 CT HEAD/BRAIN W/DYE: CPT | Mod: 26,MA,59

## 2021-02-05 PROCEDURE — 71045 X-RAY EXAM CHEST 1 VIEW: CPT | Mod: 26

## 2021-02-05 PROCEDURE — 70491 CT SOFT TISSUE NECK W/DYE: CPT | Mod: 26,MA

## 2021-02-05 PROCEDURE — G1004: CPT

## 2021-02-05 RX ORDER — DEXTROSE 50 % IN WATER 50 %
25 SYRINGE (ML) INTRAVENOUS ONCE
Refills: 0 | Status: DISCONTINUED | OUTPATIENT
Start: 2021-02-05 | End: 2021-02-11

## 2021-02-05 RX ORDER — SODIUM CHLORIDE 9 MG/ML
1000 INJECTION, SOLUTION INTRAVENOUS ONCE
Refills: 0 | Status: COMPLETED | OUTPATIENT
Start: 2021-02-05 | End: 2021-02-05

## 2021-02-05 RX ORDER — MORPHINE SULFATE 50 MG/1
4 CAPSULE, EXTENDED RELEASE ORAL ONCE
Refills: 0 | Status: DISCONTINUED | OUTPATIENT
Start: 2021-02-05 | End: 2021-02-05

## 2021-02-05 RX ORDER — SODIUM CHLORIDE 9 MG/ML
1000 INJECTION, SOLUTION INTRAVENOUS
Refills: 0 | Status: DISCONTINUED | OUTPATIENT
Start: 2021-02-05 | End: 2021-02-11

## 2021-02-05 RX ORDER — ACETAMINOPHEN 500 MG
650 TABLET ORAL ONCE
Refills: 0 | Status: COMPLETED | OUTPATIENT
Start: 2021-02-05 | End: 2021-02-05

## 2021-02-05 RX ORDER — GLUCAGON INJECTION, SOLUTION 0.5 MG/.1ML
1 INJECTION, SOLUTION SUBCUTANEOUS ONCE
Refills: 0 | Status: DISCONTINUED | OUTPATIENT
Start: 2021-02-05 | End: 2021-02-11

## 2021-02-05 RX ORDER — DEXTROSE 50 % IN WATER 50 %
12.5 SYRINGE (ML) INTRAVENOUS ONCE
Refills: 0 | Status: DISCONTINUED | OUTPATIENT
Start: 2021-02-05 | End: 2021-02-11

## 2021-02-05 RX ORDER — KETOROLAC TROMETHAMINE 30 MG/ML
30 SYRINGE (ML) INJECTION ONCE
Refills: 0 | Status: DISCONTINUED | OUTPATIENT
Start: 2021-02-05 | End: 2021-02-05

## 2021-02-05 RX ORDER — SIMVASTATIN 20 MG/1
20 TABLET, FILM COATED ORAL AT BEDTIME
Refills: 0 | Status: DISCONTINUED | OUTPATIENT
Start: 2021-02-05 | End: 2021-02-11

## 2021-02-05 RX ORDER — METOCLOPRAMIDE HCL 10 MG
10 TABLET ORAL ONCE
Refills: 0 | Status: COMPLETED | OUTPATIENT
Start: 2021-02-05 | End: 2021-02-05

## 2021-02-05 RX ORDER — INSULIN LISPRO 100/ML
VIAL (ML) SUBCUTANEOUS
Refills: 0 | Status: DISCONTINUED | OUTPATIENT
Start: 2021-02-05 | End: 2021-02-11

## 2021-02-05 RX ORDER — HYDROCORTISONE 20 MG
125 TABLET ORAL ONCE
Refills: 0 | Status: DISCONTINUED | OUTPATIENT
Start: 2021-02-05 | End: 2021-02-05

## 2021-02-05 RX ORDER — ACETAMINOPHEN 500 MG
975 TABLET ORAL EVERY 6 HOURS
Refills: 0 | Status: DISCONTINUED | OUTPATIENT
Start: 2021-02-05 | End: 2021-02-07

## 2021-02-05 RX ORDER — ACETAMINOPHEN 500 MG
975 TABLET ORAL ONCE
Refills: 0 | Status: COMPLETED | OUTPATIENT
Start: 2021-02-05 | End: 2021-02-05

## 2021-02-05 RX ORDER — LISINOPRIL 2.5 MG/1
5 TABLET ORAL DAILY
Refills: 0 | Status: DISCONTINUED | OUTPATIENT
Start: 2021-02-05 | End: 2021-02-11

## 2021-02-05 RX ORDER — MAGNESIUM SULFATE 500 MG/ML
2 VIAL (ML) INJECTION ONCE
Refills: 0 | Status: COMPLETED | OUTPATIENT
Start: 2021-02-05 | End: 2021-02-05

## 2021-02-05 RX ORDER — OFLOXACIN 0.3 %
5 DROPS OPHTHALMIC (EYE)
Refills: 0 | Status: DISCONTINUED | OUTPATIENT
Start: 2021-02-05 | End: 2021-02-11

## 2021-02-05 RX ORDER — DEXTROSE 50 % IN WATER 50 %
15 SYRINGE (ML) INTRAVENOUS ONCE
Refills: 0 | Status: DISCONTINUED | OUTPATIENT
Start: 2021-02-05 | End: 2021-02-11

## 2021-02-05 RX ORDER — KETOROLAC TROMETHAMINE 30 MG/ML
15 SYRINGE (ML) INJECTION EVERY 6 HOURS
Refills: 0 | Status: DISCONTINUED | OUTPATIENT
Start: 2021-02-05 | End: 2021-02-10

## 2021-02-05 RX ORDER — KETOROLAC TROMETHAMINE 30 MG/ML
15 SYRINGE (ML) INJECTION ONCE
Refills: 0 | Status: DISCONTINUED | OUTPATIENT
Start: 2021-02-05 | End: 2021-02-05

## 2021-02-05 RX ADMIN — Medication 50 GRAM(S): at 06:59

## 2021-02-05 RX ADMIN — Medication 1: at 22:04

## 2021-02-05 RX ADMIN — SODIUM CHLORIDE 1000 MILLILITER(S): 9 INJECTION, SOLUTION INTRAVENOUS at 04:32

## 2021-02-05 RX ADMIN — Medication 15 MILLIGRAM(S): at 06:59

## 2021-02-05 RX ADMIN — Medication 30 MILLIGRAM(S): at 20:40

## 2021-02-05 RX ADMIN — Medication 5 DROP(S): at 18:35

## 2021-02-05 RX ADMIN — Medication 650 MILLIGRAM(S): at 06:05

## 2021-02-05 RX ADMIN — SODIUM CHLORIDE 1000 MILLILITER(S): 9 INJECTION, SOLUTION INTRAVENOUS at 06:04

## 2021-02-05 RX ADMIN — Medication 10 MILLIGRAM(S): at 05:14

## 2021-02-05 RX ADMIN — MORPHINE SULFATE 4 MILLIGRAM(S): 50 CAPSULE, EXTENDED RELEASE ORAL at 02:10

## 2021-02-05 RX ADMIN — Medication 650 MILLIGRAM(S): at 14:35

## 2021-02-05 RX ADMIN — MORPHINE SULFATE 4 MILLIGRAM(S): 50 CAPSULE, EXTENDED RELEASE ORAL at 16:17

## 2021-02-05 NOTE — ED CDU PROVIDER INITIAL DAY NOTE - ATTENDING CONTRIBUTION TO CARE
Attending MD Mcmillan:    Physician assistant note reviewed and agree on plan of care and except where noted.  See HPI, PE, and MDM for details.

## 2021-02-05 NOTE — CONSULT NOTE ADULT - ASSESSMENT
INCOMPLETE  Assessment:  55y ***-handed M with h/o *** No pertinent past medical history     p/w    On exam, he has     IMPRESSION:    Plan  []  []  []    Assessment and plan discussed/not discussed with the attending, Dr. Grant Doctor    Rolo Riggins, DO  PGY-3 Neurology Service INCOMPLETE  Assessment:  55y R-handed M with h/o HTN and DM2 p/w worsening daily HA over the left temporal region, No pertinent past medical historyNo acute intracranial hemorrhage, large cortical infarct or mass effect. If clinically indicated, short-term follow-up or MRI may be obtained for further evaluation.    Nonspecific bilateral mastoid opacification. Recommend clinical correlation to assess acute mastoiditis.         p/w    On exam, he has     IMPRESSION:    Plan  []  []  []    Assessment and plan discussed/not discussed with the attending, Dr. Grant Doctor    Rolo Riggins, DO  PGY-3 Neurology Service INCOMPLETE  Assessment:  55y R-handed M with h/o HTN and DM2 p/w worsening daily intermittent 5-10 throbbing left temporal HA worse with valsalva maneuvers a/w L. ear tenderness and L. ear hearing loss and recent suspected mastoiditis s/p augmentin concerning for exacerbation of his prior mastoiditis vs. increased ICP secondary to IIH vs. infectious.     Nonspecific bilateral mastoid opacification. Recommend clinical correlation to assess acute mastoiditis.         p/w    On exam, he has     IMPRESSION:    Plan  []  []  []    Assessment and plan discussed/not discussed with the attending, Dr. Grant Doctor    Rolo Riggins, DO  PGY-3 Neurology Service INCOMPLETE  Assessment:  55y R-handed M with h/o HTN and DM2 p/w worsening daily intermittent 5-10 throbbing left temporal HA worse with valsalva maneuvers a/w L. ear tenderness and L. ear hearing loss and recent suspected mastoiditis s/p augmentin concerning for exacerbation of his prior mastoiditis vs. increased ICP secondary to IIH vs. VST vs. infection     On exam, he has     IMPRESSION:    Plan  []CDU for further eval and testing  []CTA/CTV now to evaluate for  []MRI brain w/wo contrast with thin cuts through the IACs and cavernous sinus.   []Test serum HIV, HSV1/2, HHV6, serum rhizopus, HCV, HBV, VZV serum   []Re-consult ENT   []Until CTA/CTV negative for acute thrombosis would avoid NSAIDs.  Can use low dose tylenol 500mg q8h until then.  If negative then can add toradol 15mg q8h IV can increase to 30 mg q8h IV.     Rolo Riggins, DO  PGY-3 Neurology Service

## 2021-02-05 NOTE — ED CDU PROVIDER INITIAL DAY NOTE - OBJECTIVE STATEMENT
55 year old M presents to ED c/o worsening  L sided headache radiating to neck and ear. Pt was seen here 2 weeks ago and found to have mastoiditis on CT and DC'd home with Augmentin for 10 days and f/u with ENT next Monday but pain worsened and pt couldn't wait until then. Pt states he completed Abx course. Patient has been taking tylenol and toradol everyday without pain relief.   pt denies any fever, chills, dizziness, cp, cough, sob, abdominal pain, n/v/d, discharge from ear, trouble opening his mouth 55 year old M presents to ED c/o worsening  L sided headache radiating to neck and ear. Pt was seen here 2 weeks ago and found to have mastoiditis on CT and DC'd home with Augmentin for 10 days and f/u with ENT next Monday but pain worsened and pt couldn't wait until then. Pt states he completed Abx course. Patient has been taking tylenol and toradol everyday without pain relief.   pt denies any fever, chills, dizziness, cp, cough, sob, abdominal pain, n/v/d, discharge from ear, trouble opening his mouth    In ED pt has unremarkable labs, CTA H/n remarkable for partial opacification of L middle ear and mastoid cells, Neuro recommending MRI w/w/o contrast with thin cuts through IAC/ cavernous sinus to better evaluate for osteo/mastoiditis/ Central venous thrombosis. Less concerned for GCA per neruo, ENT recommended ofloxacin drops to the left ear, concerned for chronic process, pain controlled with toradol, sent to CDU for MRI

## 2021-02-05 NOTE — CONSULT NOTE ADULT - PROBLEM SELECTOR RECOMMENDATION 9
- F/u neuro recs and MRI   - Plan for CDU overnight   - If patient is able to go home this weekend, he will follow up with ear specialist Dr. Macias as an outpatient on Monday   - ENT will continue to follow  - Call with questions or concerns - Floxin gtt 5 drops BID x7days to the left ear  - F/u neuro recs and MRI   - Plan for CDU overnight   - If patient is able to go home this weekend, he will follow up with ear specialist Dr. Macias as an outpatient on Monday   - ENT will continue to follow  - Call with questions or concerns

## 2021-02-05 NOTE — ED ADULT NURSE NOTE - OBJECTIVE STATEMENT
55 yr old male arrived to the ED from home c/o headache and L ear ache x6 weeks 55 yr old male arrived to the ED from home c/o headache and L ear ache x6 weeks. pt states the pain worsened over the last 2 weeks and the headache is over the L side of his head. pt endorses taking 1,500mg every 6 hours for the past few days. HX DM, HTN, HDL. upon assessment pt is A&ox4, speaking clearly, answering questions and following commands. pt is ambulatory with a steady gait. 4mm PERRLA. lungs clear jhoan. respirations are even and unlabored. pt denies abd pain, fever, chills, blurry or double vision

## 2021-02-05 NOTE — CONSULT NOTE ADULT - ASSESSMENT
55 year old M presents to ED c/o worsening L sided headache radiating to neck and ear. Last seen in ED on 1/23 and found to have acute left sided mastoiditis, d/c'd with augmentin and toradol and told to follow up as an outpatient with ENT this monday however he returns with persistent left ear pain. On exam pt noted to have B/L TM retractions and thickening with scant clear otorrhea on the left side. This is likely 2/2 chronic ear disease / eustachian tube dysfunction. Repeat CT head/neck described above.  55 year old M presents to ED c/o worsening L sided headache radiating to neck and ear. Last seen in ED on 1/23 and found to have acute left sided mastoiditis, d/c'd with augmentin and toradol and told to follow up as an outpatient with ENT this monday however he returns with persistent left ear pain. On exam pt noted to have B/L TM retractions and thickening with scant clear otorrhea on the left side. This is likely 2/2 chronic ear disease. Repeat CT head/neck described above.

## 2021-02-05 NOTE — ED ADULT NURSE REASSESSMENT NOTE - NS ED NURSE REASSESS COMMENT FT1
At 1345 pt can't be found in ED. MD Wallace called patient's cell phone, pt states he's in parking lot, ready to leave the hospital. Was advised to come back to ED for neurologists are waiting for him. Pt came back after 10 minutes. States I'd been here since last night, I can't wait anymore." Emotional support given. Pt made aware that it's  very important that neurologist that he will be seen by neurologist, verbalized understanding. Will continue to reassess. At 1345 pt can't be found in ED. MD Wallace called patient's cell phone, pt states he's in parking lot, ready to leave the hospital. Was advised to come back to ED for neurologists are waiting for him. Pt came back after 10 minutes. States I'd been here since last night, I can't wait anymore." Emotional support given. Pt made aware that it's  very important  that he will be seen by neurologist, verbalized understanding. Will continue to reassess.

## 2021-02-05 NOTE — ED CDU PROVIDER INITIAL DAY NOTE - PROGRESS NOTE DETAILS
Spoke with neuro resident, discussed CTA results. plan to continue with plan fot CDU fro pain control and MRI of Brain w/w/o, thin cuts through IAC and cavernous sinus, low susp. for GCA, no need for steroids.

## 2021-02-05 NOTE — ED ADULT NURSE REASSESSMENT NOTE - NS ED NURSE REASSESS COMMENT FT1
Received pt from DARIAN Shah , received pt alert and responsive, oriented x4, denies any respiratory distress, SOB, or difficulty breathing. Pt transferred to CDU for headache currently c/o 1/10 pain at this time. Pending MRI in AM pt aware of plan.  IV in place, patent and free of signs of infiltration,  pt denies chest pain or palpitations, V/S stable, pt afebrile, Pt educated on unit and unit rules, instructed patient to notify RN of any needed assistance, Pt verbalizes understanding, Call bell placed within reach. Safety maintained. Will continue to monitor.

## 2021-02-05 NOTE — CONSULT NOTE ADULT - SUBJECTIVE AND OBJECTIVE BOX
55y R-handed man with a PMH of  who presented on 02-04 with a CC of  being evaluated for headache.  HA first started *** and was *** in onset.  He describes the HA as *quality* *location* ***/10 which do *** cause him to miss work/school.  PT admits/denies associated ***aura, photophobia, phonophobia, nausea, vomitting, confusion, dizziness, light-headedness.  The HA does *** change with position, sneezing, coughing or BM.  Exacerbating factors include ***.   He does *** have a prior HA history and has *** required inpatient treatment of headache in the past. Currently, his frequency of  HA is ***.     Home Headache and Pain Treatments:   Current Abortive: ***  Current Prophylactic: ***  Current caffeine use: ***  Headache triggers:   Previously failed medications: ***  Previously effective abortive IV medications: ***  Previous procedures for headache treatment:    NEUROLOGY CONSULT   HPI: RAY FISCHER is a 55y ***-handed man with a PMH of *** who presented on 02-04-21 with .      ROS: A 10-system ROS was performed and is negative except for those items noted above.     PMH:  HTN  HLD   Tobacco abuse     Home Medications:    MEDICATIONS  (STANDING):    MEDICATIONS  (PRN):      ALLERGIES: No Known Allergies      PSH:   No significant past surgical history        Social History:    FH:      OBJECTIVE  Vital Signs Last 24 Hrs  T(C): 36.9 (05 Feb 2021 11:33), Max: 36.9 (04 Feb 2021 20:16)  T(F): 98.5 (05 Feb 2021 11:33), Max: 98.5 (05 Feb 2021 11:33)  HR: 63 (05 Feb 2021 11:33) (63 - 88)  BP: 117/62 (05 Feb 2021 11:33) (111/62 - 163/96)  BP(mean): --  RR: 16 (05 Feb 2021 11:33) (16 - 17)  SpO2: 98% (05 Feb 2021 11:33) (98% - 100%)  I&O's Summary      PHYSICAL EXAM:  General: male appears stated age, in NAD  Cardiac: S1, S2 normal. RRR with regular pulse.  No murmurs, rubs or gallops.  Respiratory: CTA throughout with good air entry.  MSK: No erythema, tenderness or deformities.   Skin: No rashes, lesions or color changes.  Neurologic:  Mental Status: Awake, alert, oriented to person, place, situation, time. Normal affect. Follows all commands.    Language: Speech is clear, fluent with preserved naming, repetition and comprehension.      Cranial Nerves: PERRLA (R = 3mm, L = 3mm). Visual fields intact. EOMI no nystagmus. V1-3 intact to light touch.  No facial asymmetry b/l, full eye closure strength b/l. Hearing grossly normal to conversation.  Symmetric palate elevation in midline.  Head turning & shoulder shrug intact b/l. Tongue midline, normal movements, no atrophy.  Motor: Normal muscle bulk & tone. No noticeable tremor, myoclonus or pronator drift. ***/5 strength.	  Sensation: Symmetric B/L preserved sensation to light touch, pin prick, position.    Cortical: No extinction on double simultaneous touch and no signs of neglect.   Reflexes: ***/4.  Plantar Responses are ***.   Coordination: Intact rapid-alternating movements. No dysmetria on finger-to-nose or heel-to-shin.  No dysdiadodyskinesia  Gait: Normal stance, stride length, touch off, arm swing and claudia.   Normal Romberg. No postural instability.     Psychiatric: Normal mood and congruent affect.     CBC:                        13.4   13.74 )-----------( 375      ( 04 Feb 2021 21:28 )             40.9       CMP:  02-05    140  |  106  |  7   ----------------------------<  111<H>  4.3   |  24  |  0.64    Ca    9.2      05 Feb 2021 06:18    TPro  7.1  /  Alb  3.6  /  TBili  0.2  /  DBili  x   /  AST  18  /  ALT  28  /  AlkPhos  61  02-05    LIVER FUNCTIONS - ( 05 Feb 2021 06:18 )  Alb: 3.6 g/dL / Pro: 7.1 g/dL / ALK PHOS: 61 U/L / ALT: 28 U/L / AST: 18 U/L / GGT: x             COAGS:      UA:      Cardiac:  CARDIAC MARKERS ( 05 Feb 2021 06:18 )  x     / x     / 64 U/L / x     / x            ABG:      MICRO/CULTURES:        Neuro LABS    CSF Results:       Imaging/Studies:      CT Head No Cont:   EXAM:  CT BRAIN                            PROCEDURE DATE:  01/22/2021            INTERPRETATION:  HISTORY: New severe headache.    TECHNIQUE: Multiple contiguous axial images were acquired from the skullbase to the vertex without the administration of intravenous contrast.  Coronal and sagittal reformations were made.    COMPARISON: None.    FINDINGS: There is no acute intracranial hemorrhage, large cortical infarct, mass effect or midline shift. There is suggestion of nonspecific mild periventricular and subcortical white matter lucencies. There is mild cerebral volume loss with ventricular dilatation.    There is no depressed skull fracture. There is sinus mucosal thickening with a retention cyst/polyp in the right maxillary sinus. Nonspecific bilateral mastoid opacification. Prominent adenoids.    IMPRESSION:    No acute intracranial hemorrhage, large cortical infarct or mass effect. If clinically indicated, short-term follow-up or MRI may be obtained for further evaluation.    Nonspecific bilateral mastoid opacification. Recommend clinical correlation to assess acute mastoiditis.              JENS ALVARENGA MD; Resident Radiology  This document has been electronically signed.  CARLOS ACOSTA MD; Attending Radiologist  This document has been electronically signed. Jan 22 2021 11:35PM (01-22-21)       55y R-handed man with a PMH of HTN, DM2 who presented on 02-04 with a CC of daily headache.  DUNAWAY first started *** and was *** in onset.  He describes the HA as *quality* *location* ***/10 which do *** cause him to miss work/school.  PT admits/denies associated ***aura, photophobia, phonophobia, nausea, vomitting, confusion, dizziness, light-headedness.  The HA does *** change with position, sneezing, coughing or BM.  Exacerbating factors include ***.   He does *** have a prior HA history and has *** required inpatient treatment of headache in the past. Currently, his frequency of  HA is ***.     Home Headache and Pain Treatments:   Current Abortive: ***  Current Prophylactic: ***  Current caffeine use: ***  Headache triggers:   Previously failed medications: ***  Previously effective abortive IV medications: ***  Previous procedures for headache treatment:    NEUROLOGY CONSULT   HPI: RAY FISCHER is a 55y ***-handed man with a PMH of *** who presented on 02-04-21 with .      ROS: A 10-system ROS was performed and is negative except for those items noted above.     PMH:  HTN  HLD   Tobacco abuse     Home Medications:    MEDICATIONS  (STANDING):    MEDICATIONS  (PRN):      ALLERGIES: No Known Allergies      PSH:   No significant past surgical history        Social History:    FH:      OBJECTIVE  Vital Signs Last 24 Hrs  T(C): 36.9 (05 Feb 2021 11:33), Max: 36.9 (04 Feb 2021 20:16)  T(F): 98.5 (05 Feb 2021 11:33), Max: 98.5 (05 Feb 2021 11:33)  HR: 63 (05 Feb 2021 11:33) (63 - 88)  BP: 117/62 (05 Feb 2021 11:33) (111/62 - 163/96)  BP(mean): --  RR: 16 (05 Feb 2021 11:33) (16 - 17)  SpO2: 98% (05 Feb 2021 11:33) (98% - 100%)  I&O's Summary      PHYSICAL EXAM:  General: male appears stated age, in NAD  Cardiac: S1, S2 normal. RRR with regular pulse.  No murmurs, rubs or gallops.  Respiratory: CTA throughout with good air entry.  MSK: No erythema, tenderness or deformities.   Skin: No rashes, lesions or color changes.  Neurologic:  Mental Status: Awake, alert, oriented to person, place, situation, time. Normal affect. Follows all commands.    Language: Speech is clear, fluent with preserved naming, repetition and comprehension.      Cranial Nerves: PERRLA (R = 3mm, L = 3mm). Visual fields intact. EOMI no nystagmus. V1-3 intact to light touch.  No facial asymmetry b/l, full eye closure strength b/l. Hearing grossly normal to conversation.  Symmetric palate elevation in midline.  Head turning & shoulder shrug intact b/l. Tongue midline, normal movements, no atrophy.  Motor: Normal muscle bulk & tone. No noticeable tremor, myoclonus or pronator drift. ***/5 strength.	  Sensation: Symmetric B/L preserved sensation to light touch, pin prick, position.    Cortical: No extinction on double simultaneous touch and no signs of neglect.   Reflexes: ***/4.  Plantar Responses are ***.   Coordination: Intact rapid-alternating movements. No dysmetria on finger-to-nose or heel-to-shin.  No dysdiadodyskinesia  Gait: Normal stance, stride length, touch off, arm swing and claudia.   Normal Romberg. No postural instability.     Psychiatric: Normal mood and congruent affect.     CBC:                        13.4   13.74 )-----------( 375      ( 04 Feb 2021 21:28 )             40.9       CMP:  02-05    140  |  106  |  7   ----------------------------<  111<H>  4.3   |  24  |  0.64    Ca    9.2      05 Feb 2021 06:18    TPro  7.1  /  Alb  3.6  /  TBili  0.2  /  DBili  x   /  AST  18  /  ALT  28  /  AlkPhos  61  02-05    LIVER FUNCTIONS - ( 05 Feb 2021 06:18 )  Alb: 3.6 g/dL / Pro: 7.1 g/dL / ALK PHOS: 61 U/L / ALT: 28 U/L / AST: 18 U/L / GGT: x             COAGS:      UA:      Cardiac:  CARDIAC MARKERS ( 05 Feb 2021 06:18 )  x     / x     / 64 U/L / x     / x            ABG:      MICRO/CULTURES:        Neuro LABS    CSF Results:       Imaging/Studies:      CT Head No Cont:   EXAM:  CT BRAIN                            PROCEDURE DATE:  01/22/2021            INTERPRETATION:  HISTORY: New severe headache.    TECHNIQUE: Multiple contiguous axial images were acquired from the skullbase to the vertex without the administration of intravenous contrast.  Coronal and sagittal reformations were made.    COMPARISON: None.    FINDINGS: There is no acute intracranial hemorrhage, large cortical infarct, mass effect or midline shift. There is suggestion of nonspecific mild periventricular and subcortical white matter lucencies. There is mild cerebral volume loss with ventricular dilatation.    There is no depressed skull fracture. There is sinus mucosal thickening with a retention cyst/polyp in the right maxillary sinus. Nonspecific bilateral mastoid opacification. Prominent adenoids.    IMPRESSION:    No acute intracranial hemorrhage, large cortical infarct or mass effect. If clinically indicated, short-term follow-up or MRI may be obtained for further evaluation.    Nonspecific bilateral mastoid opacification. Recommend clinical correlation to assess acute mastoiditis.              JENS ALVARENGA MD; Resident Radiology  This document has been electronically signed.  CARLOS ACOSTA MD; Attending Radiologist  This document has been electronically signed. Jan 22 2021 11:35PM (01-22-21)       55y R-handed man with a PMH of HTN, DM2 who presented on 02-04 with a CC of daily headache.  HA first started 2 months ago and has been occurring daily. The headache is not present on waking up. He describes the HA as sharp in the left temporal region and around the left ear and ranges between 5/10-10/10 on severity and has caused him to stop working as a . Pain is currently 5/10 in the ED. He denies blurry or double vision. PT denies associated photophobia, phonophobia, nausea, or vomiting.  The HA is worse when lying flat, and worsens with sneezing, coughing, and laughing. He also endorses 5lb weight loss during this time. HA is alleviated by rubbing on his left temple, and he has taken 1500mg Tylenol TID for the last week with only mild HA relief. He endorses "whishing" sound in both of his ears that has been ongoing for 15-20 years. He states that  He denies prior HA history and was seen in the ED on 1/22/21 for the same symptoms and was treated with Augmentin 875mg BID as per ENT recs for presumed mastoiditis. Neurology consulted for HA management.     Home Headache and Pain Treatments:   Current Abortive: ***  Current Prophylactic: ***  Current caffeine use: ***  Headache triggers:   Previously failed medications: ***  Previously effective abortive IV medications: ***  Previous procedures for headache treatment:    NEUROLOGY CONSULT   HPI: RAY FISCHER is a 55y ***-handed man with a PMH of *** who presented on 02-04-21 with .      ROS: A 10-system ROS was performed and is negative except for those items noted above.     PMH:  HTN  HLD   Tobacco abuse     Home Medications:    MEDICATIONS  (STANDING):    MEDICATIONS  (PRN):      ALLERGIES: No Known Allergies      PSH:   No significant past surgical history        Social History:    FH:      OBJECTIVE  Vital Signs Last 24 Hrs  T(C): 36.9 (05 Feb 2021 11:33), Max: 36.9 (04 Feb 2021 20:16)  T(F): 98.5 (05 Feb 2021 11:33), Max: 98.5 (05 Feb 2021 11:33)  HR: 63 (05 Feb 2021 11:33) (63 - 88)  BP: 117/62 (05 Feb 2021 11:33) (111/62 - 163/96)  BP(mean): --  RR: 16 (05 Feb 2021 11:33) (16 - 17)  SpO2: 98% (05 Feb 2021 11:33) (98% - 100%)  I&O's Summary      PHYSICAL EXAM:  General: male appears stated age, in NAD  Cardiac: S1, S2 normal. RRR with regular pulse.  No murmurs, rubs or gallops.  Respiratory: CTA throughout with good air entry.  MSK: No erythema, tenderness or deformities.   Skin: No rashes, lesions or color changes.  Neurologic:  Mental Status: Awake, alert, oriented to person, place, situation, time. Normal affect. Follows all commands.    Language: Speech is clear, fluent with preserved naming, repetition and comprehension.      Cranial Nerves: PERRLA (R = 3mm, L = 3mm). Visual fields intact. EOMI no nystagmus. V1-3 intact to light touch.  No facial asymmetry b/l, full eye closure strength b/l. Hearing grossly normal to conversation.  Symmetric palate elevation in midline.  Head turning & shoulder shrug intact b/l. Tongue midline, normal movements, no atrophy.  Motor: Normal muscle bulk & tone. No noticeable tremor, myoclonus or pronator drift. ***/5 strength.	  Sensation: Symmetric B/L preserved sensation to light touch, pin prick, position.    Cortical: No extinction on double simultaneous touch and no signs of neglect.   Reflexes: ***/4.  Plantar Responses are ***.   Coordination: Intact rapid-alternating movements. No dysmetria on finger-to-nose or heel-to-shin.  No dysdiadodyskinesia  Gait: Normal stance, stride length, touch off, arm swing and claudia.   Normal Romberg. No postural instability.     Psychiatric: Normal mood and congruent affect.     CBC:                        13.4   13.74 )-----------( 375      ( 04 Feb 2021 21:28 )             40.9       CMP:  02-05    140  |  106  |  7   ----------------------------<  111<H>  4.3   |  24  |  0.64    Ca    9.2      05 Feb 2021 06:18    TPro  7.1  /  Alb  3.6  /  TBili  0.2  /  DBili  x   /  AST  18  /  ALT  28  /  AlkPhos  61  02-05    LIVER FUNCTIONS - ( 05 Feb 2021 06:18 )  Alb: 3.6 g/dL / Pro: 7.1 g/dL / ALK PHOS: 61 U/L / ALT: 28 U/L / AST: 18 U/L / GGT: x             COAGS:      UA:      Cardiac:  CARDIAC MARKERS ( 05 Feb 2021 06:18 )  x     / x     / 64 U/L / x     / x            ABG:      MICRO/CULTURES:        Neuro LABS    CSF Results:       Imaging/Studies:      CT Head No Cont:   EXAM:  CT BRAIN                            PROCEDURE DATE:  01/22/2021            INTERPRETATION:  HISTORY: New severe headache.    TECHNIQUE: Multiple contiguous axial images were acquired from the skullbase to the vertex without the administration of intravenous contrast.  Coronal and sagittal reformations were made.    COMPARISON: None.    FINDINGS: There is no acute intracranial hemorrhage, large cortical infarct, mass effect or midline shift. There is suggestion of nonspecific mild periventricular and subcortical white matter lucencies. There is mild cerebral volume loss with ventricular dilatation.    There is no depressed skull fracture. There is sinus mucosal thickening with a retention cyst/polyp in the right maxillary sinus. Nonspecific bilateral mastoid opacification. Prominent adenoids.    IMPRESSION:    No acute intracranial hemorrhage, large cortical infarct or mass effect. If clinically indicated, short-term follow-up or MRI may be obtained for further evaluation.    Nonspecific bilateral mastoid opacification. Recommend clinical correlation to assess acute mastoiditis.              JENS ALVARENGA MD; Resident Radiology  This document has been electronically signed.  CARLOS ACOSTA MD; Attending Radiologist  This document has been electronically signed. Jan 22 2021 11:35PM (01-22-21)       55y R-handed man with a PMH of HTN, DM2 who presented on 02-04 with a CC of daily headache.  HA first started 2 months ago and has been occurring daily. The headache is not present on waking up. He describes the HA as sharp in the left temporal region and around the left ear and ranges between 5/10-10/10 severity and has caused him to stop working as a . Pain is currently 5/10 in the ED. He denies blurry or double vision. PT denies associated photophobia, phonophobia, nausea, or vomiting.  The HA is worse when lying flat, and worsens with sneezing, coughing, and laughing. He also endorses 5lb weight loss during this time. HA is alleviated by rubbing on his left temple, and he has taken 1500mg Tylenol TID for the last week with only mild HA relief. He endorses "whishing" sound in both of his ears that has been ongoing for 15-20 years. He denies difficulty walking. He denies prior HA history and was seen in the ED on 1/22/21 for the same symptoms and was treated with Augmentin 875mg BID as per ENT recs for presumed mastoiditis. Neurology consulted for HA management.     Home Headache and Pain Treatments:   Current Abortive: ***  Current Prophylactic: ***  Current caffeine use: ***  Headache triggers:   Previously failed medications: ***  Previously effective abortive IV medications: ***  Previous procedures for headache treatment:    NEUROLOGY CONSULT   HPI: RAY FISCHER is a 55y ***-handed man with a PMH of *** who presented on 02-04-21 with .      ROS: A 10-system ROS was performed and is negative except for those items noted above.     PMH:   DM2  HTN  HLD   Tobacco abuse     Home Medications:    MEDICATIONS  (STANDING):    MEDICATIONS  (PRN):      ALLERGIES: No Known Allergies      PSH:   No significant past surgical history        Social History:    FH:      OBJECTIVE  Vital Signs Last 24 Hrs  T(C): 36.9 (05 Feb 2021 11:33), Max: 36.9 (04 Feb 2021 20:16)  T(F): 98.5 (05 Feb 2021 11:33), Max: 98.5 (05 Feb 2021 11:33)  HR: 63 (05 Feb 2021 11:33) (63 - 88)  BP: 117/62 (05 Feb 2021 11:33) (111/62 - 163/96)  BP(mean): --  RR: 16 (05 Feb 2021 11:33) (16 - 17)  SpO2: 98% (05 Feb 2021 11:33) (98% - 100%)  I&O's Summary      PHYSICAL EXAM:  General: male appears stated age, in NAD  Cardiac: S1, S2 normal. RRR with regular pulse.  No murmurs, rubs or gallops.  Respiratory: CTA throughout with good air entry.  MSK: No erythema, tenderness or deformities.   Skin: No rashes, lesions or color changes.  Neurologic:  Mental Status: Awake, alert, oriented to person, place, situation, time. Normal affect. Follows all commands.    Language: Speech is clear, fluent with preserved naming, repetition and comprehension.      Cranial Nerves: PERRLA (R = 3mm, L = 3mm). Visual fields intact. EOMI no nystagmus. V1-3 intact to light touch.  No facial asymmetry b/l, full eye closure strength b/l. Hearing grossly normal to conversation.  Symmetric palate elevation in midline.  Head turning & shoulder shrug intact b/l. Tongue midline, normal movements, no atrophy.  Motor: Normal muscle bulk & tone. No noticeable tremor, myoclonus or pronator drift. ***/5 strength.	  Sensation: Symmetric B/L preserved sensation to light touch, pin prick, position.    Cortical: No extinction on double simultaneous touch and no signs of neglect.   Reflexes: ***/4.  Plantar Responses are ***.   Coordination: Intact rapid-alternating movements. No dysmetria on finger-to-nose or heel-to-shin.  No dysdiadodyskinesia  Gait: Normal stance, stride length, touch off, arm swing and claudia.   Normal Romberg. No postural instability.     Psychiatric: Normal mood and congruent affect.     CBC:                        13.4   13.74 )-----------( 375      ( 04 Feb 2021 21:28 )             40.9       CMP:  02-05    140  |  106  |  7   ----------------------------<  111<H>  4.3   |  24  |  0.64    Ca    9.2      05 Feb 2021 06:18    TPro  7.1  /  Alb  3.6  /  TBili  0.2  /  DBili  x   /  AST  18  /  ALT  28  /  AlkPhos  61  02-05    LIVER FUNCTIONS - ( 05 Feb 2021 06:18 )  Alb: 3.6 g/dL / Pro: 7.1 g/dL / ALK PHOS: 61 U/L / ALT: 28 U/L / AST: 18 U/L / GGT: x             COAGS:      UA:      Cardiac:  CARDIAC MARKERS ( 05 Feb 2021 06:18 )  x     / x     / 64 U/L / x     / x            ABG:      MICRO/CULTURES:        Neuro LABS    CSF Results:       Imaging/Studies:      CT Head No Cont:   EXAM:  CT BRAIN                            PROCEDURE DATE:  01/22/2021            INTERPRETATION:  HISTORY: New severe headache.    TECHNIQUE: Multiple contiguous axial images were acquired from the skullbase to the vertex without the administration of intravenous contrast.  Coronal and sagittal reformations were made.    COMPARISON: None.    FINDINGS: There is no acute intracranial hemorrhage, large cortical infarct, mass effect or midline shift. There is suggestion of nonspecific mild periventricular and subcortical white matter lucencies. There is mild cerebral volume loss with ventricular dilatation.    There is no depressed skull fracture. There is sinus mucosal thickening with a retention cyst/polyp in the right maxillary sinus. Nonspecific bilateral mastoid opacification. Prominent adenoids.    IMPRESSION:    No acute intracranial hemorrhage, large cortical infarct or mass effect. If clinically indicated, short-term follow-up or MRI may be obtained for further evaluation.    Nonspecific bilateral mastoid opacification. Recommend clinical correlation to assess acute mastoiditis.              JENS ALVARENGA MD; Resident Radiology  This document has been electronically signed.  CARLOS ACOSTA MD; Attending Radiologist  This document has been electronically signed. Jan 22 2021 11:35PM (01-22-21)       55y R-handed man with a PMH of HTN, DM2 who presented on 02-04 with a CC of daily headache.  HA first started 2 months ago and has been occurring daily. The headache is not present on waking up. He describes the HA as sharp in the left temporal region and around the left ear and ranges between 5/10-10/10 severity and has caused him to stop working as a . Pain is currently 5/10 in the ED. He denies blurry or double vision. PT denies associated photophobia, phonophobia, nausea, or vomiting.  The HA is worse when lying flat, and worsens with sneezing, coughing, and laughing. He also endorses 5lb weight loss during this time. HA is alleviated by rubbing on his left temple, and he has taken 1500mg Tylenol TID for the last week with only mild HA relief. He endorses "whishing" sound in both of his ears that has been ongoing for 15-20 years. HA occasionally causes him to feel unsteady when ambulating. He denies prior HA history and was seen in the ED on 1/22/21 for the same symptoms and was treated with Augmentin 875mg BID as per ENT recs for presumed mastoiditis. Neurology consulted for HA management.     Home Headache and Pain Treatments:   Current Abortive: ***  Current Prophylactic: ***  Current caffeine use: ***  Headache triggers:   Previously failed medications: ***  Previously effective abortive IV medications: ***  Previous procedures for headache treatment:    NEUROLOGY CONSULT   HPI: RAY FISCHER is a 55y ***-handed man with a PMH of *** who presented on 02-04-21 with .      ROS: A 10-system ROS was performed and is negative except for those items noted above.     PMH:   DM2  HTN  HLD   Tobacco abuse     Home Medications:    MEDICATIONS  (STANDING):    MEDICATIONS  (PRN):      ALLERGIES: No Known Allergies      PSH:   No significant past surgical history        Social History:    FH:      OBJECTIVE  Vital Signs Last 24 Hrs  T(C): 36.9 (05 Feb 2021 11:33), Max: 36.9 (04 Feb 2021 20:16)  T(F): 98.5 (05 Feb 2021 11:33), Max: 98.5 (05 Feb 2021 11:33)  HR: 63 (05 Feb 2021 11:33) (63 - 88)  BP: 117/62 (05 Feb 2021 11:33) (111/62 - 163/96)  BP(mean): --  RR: 16 (05 Feb 2021 11:33) (16 - 17)  SpO2: 98% (05 Feb 2021 11:33) (98% - 100%)  I&O's Summary      PHYSICAL EXAM:  General: male appears stated age, in NAD  Cardiac: S1, S2 normal. RRR with regular pulse.  No murmurs, rubs or gallops.  Respiratory: CTA throughout with good air entry.  MSK: No erythema, tenderness or deformities.   Skin: No rashes, lesions or color changes.  Neurologic:  Mental Status: Awake, alert, oriented to person, place, situation, time. Normal affect. Follows all commands.    Language: Speech is clear, fluent with preserved naming, repetition and comprehension.      Cranial Nerves: PERRLA (R = 3mm, L = 3mm). Visual fields intact. EOMI no nystagmus. V1-3 intact to light touch.  No facial asymmetry b/l, full eye closure strength b/l. Hearing grossly normal to conversation.  Symmetric palate elevation in midline.  Head turning & shoulder shrug intact b/l. Tongue midline, normal movements, no atrophy.  Motor: Normal muscle bulk & tone. No noticeable tremor, myoclonus or pronator drift. ***/5 strength.	  Sensation: Symmetric B/L preserved sensation to light touch, pin prick, position.    Cortical: No extinction on double simultaneous touch and no signs of neglect.   Reflexes: ***/4.  Plantar Responses are ***.   Coordination: Intact rapid-alternating movements. No dysmetria on finger-to-nose or heel-to-shin.  No dysdiadodyskinesia  Gait: Normal stance, stride length, touch off, arm swing and claudia.   Normal Romberg. No postural instability.     Psychiatric: Normal mood and congruent affect.     CBC:                        13.4   13.74 )-----------( 375      ( 04 Feb 2021 21:28 )             40.9       CMP:  02-05    140  |  106  |  7   ----------------------------<  111<H>  4.3   |  24  |  0.64    Ca    9.2      05 Feb 2021 06:18    TPro  7.1  /  Alb  3.6  /  TBili  0.2  /  DBili  x   /  AST  18  /  ALT  28  /  AlkPhos  61  02-05    LIVER FUNCTIONS - ( 05 Feb 2021 06:18 )  Alb: 3.6 g/dL / Pro: 7.1 g/dL / ALK PHOS: 61 U/L / ALT: 28 U/L / AST: 18 U/L / GGT: x             COAGS:      UA:      Cardiac:  CARDIAC MARKERS ( 05 Feb 2021 06:18 )  x     / x     / 64 U/L / x     / x            ABG:      MICRO/CULTURES:        Neuro LABS    CSF Results:       Imaging/Studies:      CT Head No Cont:   EXAM:  CT BRAIN                            PROCEDURE DATE:  01/22/2021            INTERPRETATION:  HISTORY: New severe headache.    TECHNIQUE: Multiple contiguous axial images were acquired from the skullbase to the vertex without the administration of intravenous contrast.  Coronal and sagittal reformations were made.    COMPARISON: None.    FINDINGS: There is no acute intracranial hemorrhage, large cortical infarct, mass effect or midline shift. There is suggestion of nonspecific mild periventricular and subcortical white matter lucencies. There is mild cerebral volume loss with ventricular dilatation.    There is no depressed skull fracture. There is sinus mucosal thickening with a retention cyst/polyp in the right maxillary sinus. Nonspecific bilateral mastoid opacification. Prominent adenoids.    IMPRESSION:    No acute intracranial hemorrhage, large cortical infarct or mass effect. If clinically indicated, short-term follow-up or MRI may be obtained for further evaluation.    Nonspecific bilateral mastoid opacification. Recommend clinical correlation to assess acute mastoiditis.              JENS ALVARENGA MD; Resident Radiology  This document has been electronically signed.  CARLOS ACOSTA MD; Attending Radiologist  This document has been electronically signed. Jan 22 2021 11:35PM (01-22-21)

## 2021-02-05 NOTE — CONSULT NOTE ADULT - SUBJECTIVE AND OBJECTIVE BOX
CC: left ear pain     HPI: 55 year old M presents to ED c/o worsening  L sided headache radiating to neck and ear. Pt was seen here 2 weeks ago and found to have mastoiditis on CT and DC'd home with Augmentin for 10 days and f/u with ENT next Monday but pain worsened and pt couldn't wait until then. Pt states he completed Abx course. Patient has been taking tylenol and toradol everyday without pain relief. Pt denies any fever, chills, dizziness, cp, cough, sob, abdominal pain, n/v/d, discharge from ear, trouble opening his mouth.       PAST MEDICAL & SURGICAL HISTORY:  No pertinent past medical history    No significant past surgical history      Allergies    No Known Allergies    Intolerances      MEDICATIONS  (STANDING):    MEDICATIONS  (PRN):      Social History: no pertinent social history    Family history: no pertinent family history    ROS:   ENT: all negative except as noted in HPI   CV: denies palpitations  Pulm: denies SOB, cough, hemoptysis  GI: denies change in appetite indigestion, n/v  : denies pertinent urinary symptoms, urgency  Neuro: denies numbness/tingling, loss of sensation  Psych: denies anxiety  MS: denies muscle weakness, instability  Heme: denies easy bruising or bleeding  Endo: denies heat/cold intolerance, excessive sweating  Vascular: denies LE edema    Vital Signs Last 24 Hrs  T(C): 36.9 (05 Feb 2021 11:33), Max: 36.9 (04 Feb 2021 20:16)  T(F): 98.5 (05 Feb 2021 11:33), Max: 98.5 (05 Feb 2021 11:33)  HR: 53 (05 Feb 2021 16:16) (53 - 88)  BP: 125/72 (05 Feb 2021 16:16) (111/62 - 163/96)  BP(mean): --  RR: 20 (05 Feb 2021 16:16) (16 - 20)  SpO2: 98% (05 Feb 2021 16:16) (98% - 100%)                          13.4   13.74 )-----------( 375      ( 04 Feb 2021 21:28 )             40.9    02-05    140  |  106  |  7   ----------------------------<  111<H>  4.3   |  24  |  0.64    Ca    9.2      05 Feb 2021 06:18    TPro  7.1  /  Alb  3.6  /  TBili  0.2  /  DBili  x   /  AST  18  /  ALT  28  /  AlkPhos  61  02-05       PHYSICAL EXAM:  Gen: NAD  Skin: No rashes, bruises, or lesions  Head: Normocephalic, Atraumatic  Face: no edema, erythema, or fluctuance. Parotid glands soft without mass  Eyes: no scleral injection  Ears: Right - ear canal clear, TM intact with retraction and thickening, without effusion or erythema. No evidence of any fluid drainage. No mastoid tenderness, erythema, or ear bulging            Left - ear canal clear, TM intact with retraction and thickening, scant clear otorrhea, without effusion or erythema. No mastoid tenderness, erythema, or ear bulging  Nose: Nares bilaterally patent, no discharge  Mouth: No Stridor / Drooling / Trismus.  Mucosa moist, tongue/uvula midline, oropharynx clear  Neck: Flat, supple, no lymphadenopathy, trachea midline, no masses  Lymphatic: No lymphadenopathy  Resp: breathing easily, no stridor  CV: no peripheral edema/cyanosis  GI: nondistended   Peripheral vascular: no JVD or edema  Neuro: facial nerve intact, no facial droop        IMAGING/ADDITIONAL STUDIES: CT HEAD AND NECK  IMPRESSION:    CT Head: No acute intracranial hemorrhage, mass effect, or evidence of acute vascular territorial infarction. No abnormal intracranial enhancement.    Redemonstration nonspecific partial opacification of the left mastoid air cells and middle ear cavity. External auditory canal is grossly unremarkable. No drainable fluid collection.    CT Neck: Nonspecific prominence of the adenoidal soft tissue, palatine, and lingual tonsils. Cystic foci within the inferior aspects of the bilateral palatine as well as within the right lingual tonsils, nonspecific, but may represent retention cysts. Correlation with direct visualization is recommended.         CC: left ear pain     HPI: 55 year old M presents to ED c/o worsening  L sided headache radiating to neck and ear. Pt was seen here 2 weeks ago and found to have mastoiditis on CT and DC'd home with Augmentin for 10 days and f/u with ENT next Monday but pain worsened and pt couldn't wait until then. Pt states he completed Abx course. Patient has been taking tylenol and toradol everyday without pain relief. Pt denies any fever, chills, dizziness, cp, cough, sob, abdominal pain, n/v/d, discharge from ear, trouble opening his mouth.       PAST MEDICAL & SURGICAL HISTORY:  No pertinent past medical history    No significant past surgical history      Allergies    No Known Allergies    Intolerances      MEDICATIONS  (STANDING):    MEDICATIONS  (PRN):      Social History: no pertinent social history    Family history: no pertinent family history    ROS:   ENT: all negative except as noted in HPI   CV: denies palpitations  Pulm: denies SOB, cough, hemoptysis  GI: denies change in appetite indigestion, n/v  : denies pertinent urinary symptoms, urgency  Neuro: denies numbness/tingling, loss of sensation  Psych: denies anxiety  MS: denies muscle weakness, instability  Heme: denies easy bruising or bleeding  Endo: denies heat/cold intolerance, excessive sweating  Vascular: denies LE edema    Vital Signs Last 24 Hrs  T(C): 36.9 (05 Feb 2021 11:33), Max: 36.9 (04 Feb 2021 20:16)  T(F): 98.5 (05 Feb 2021 11:33), Max: 98.5 (05 Feb 2021 11:33)  HR: 53 (05 Feb 2021 16:16) (53 - 88)  BP: 125/72 (05 Feb 2021 16:16) (111/62 - 163/96)  BP(mean): --  RR: 20 (05 Feb 2021 16:16) (16 - 20)  SpO2: 98% (05 Feb 2021 16:16) (98% - 100%)                          13.4   13.74 )-----------( 375      ( 04 Feb 2021 21:28 )             40.9    02-05    140  |  106  |  7   ----------------------------<  111<H>  4.3   |  24  |  0.64    Ca    9.2      05 Feb 2021 06:18    TPro  7.1  /  Alb  3.6  /  TBili  0.2  /  DBili  x   /  AST  18  /  ALT  28  /  AlkPhos  61  02-05       PHYSICAL EXAM:  Gen: NAD  Skin: No rashes, bruises, or lesions  Head: Normocephalic, Atraumatic  Face: no edema, erythema, or fluctuance. Parotid glands soft without mass  Eyes: no scleral injection  Ears: Right - ear canal clear, TM with extensive retraction and thickening, without effusion or erythema. No evidence of any fluid drainage. No mastoid tenderness, erythema, or ear bulging            Left - ear canal clear, TM with extensive retraction and thickening, scant clear otorrhea, without effusion or erythema. No mastoid tenderness, erythema, or ear bulging  Nose: Nares bilaterally patent, no discharge  Mouth: No Stridor / Drooling / Trismus.  Mucosa moist, tongue/uvula midline, oropharynx clear  Neck: Flat, supple, no lymphadenopathy, trachea midline, no masses  Lymphatic: No lymphadenopathy  Resp: breathing easily, no stridor  CV: no peripheral edema/cyanosis  GI: nondistended   Peripheral vascular: no JVD or edema  Neuro: facial nerve intact, no facial droop        IMAGING/ADDITIONAL STUDIES: CT HEAD AND NECK  IMPRESSION:    CT Head: No acute intracranial hemorrhage, mass effect, or evidence of acute vascular territorial infarction. No abnormal intracranial enhancement.    Redemonstration nonspecific partial opacification of the left mastoid air cells and middle ear cavity. External auditory canal is grossly unremarkable. No drainable fluid collection.    CT Neck: Nonspecific prominence of the adenoidal soft tissue, palatine, and lingual tonsils. Cystic foci within the inferior aspects of the bilateral palatine as well as within the right lingual tonsils, nonspecific, but may represent retention cysts. Correlation with direct visualization is recommended.

## 2021-02-05 NOTE — ED CDU PROVIDER INITIAL DAY NOTE - PHYSICAL EXAMINATION
Vital signs reviewed  GENERAL: Patient nontoxic appearing, NAD  HEAD: NCAT  EYES: Anicteric  ENT: +left TM erythema. +ttp over left mastoid, left tragus extending towards cheek and neck. no uvular swelling.   NECK: Supple, non tender  RESPIRATORY: Normal respiratory effort. CTA B/L. No wheezing, rales, rhonchi  CARDIOVASCULAR: Regular rate and rhythm  ABDOMEN: Soft. Nondistended. Nontender. No guarding or rebound. No CVA tenderness.  MUSCULOSKELETAL/EXTREMITIES: Brisk cap refill. 2+ radial pulses. No leg edema.  SKIN:  Warm and dry  NEURO: AAOx3. No gross FND.  PSYCHIATRIC: Cooperative. Affect appropriate.  Attending Halley Benavides: Gen: NAD, heent: atrauamtic, eomi, perrla, mild left tonsilar erythema, ttp left temporal area, handling secreations, no trismus, uvula midline, neck; nttp,left cervical adenopathy, chest: nttp, no crepitus, cv: rrr, no murmurs, lungs: ctab, abd: soft, nontender, nondistended, no peritoneal signs, +BS, no guarding, ext: wwp, neg homans, skin: no rash, neuro: awake and alert, following commands, speech clear, sensation and strength intact, no focal deficits

## 2021-02-06 DIAGNOSIS — M86.9 OSTEOMYELITIS, UNSPECIFIED: ICD-10-CM

## 2021-02-06 LAB
ERYTHROCYTE [SEDIMENTATION RATE] IN BLOOD: 75 MM/HR — HIGH (ref 0–20)
GLUCOSE BLDC GLUCOMTR-MCNC: 119 MG/DL — HIGH (ref 70–99)
GLUCOSE BLDC GLUCOMTR-MCNC: 120 MG/DL — HIGH (ref 70–99)
GLUCOSE BLDC GLUCOMTR-MCNC: 155 MG/DL — HIGH (ref 70–99)
HAV IGM SER-ACNC: SIGNIFICANT CHANGE UP
HBV CORE IGM SER-ACNC: SIGNIFICANT CHANGE UP
HBV SURFACE AG SER-ACNC: SIGNIFICANT CHANGE UP
HCV AB S/CO SERPL IA: 0.14 S/CO — SIGNIFICANT CHANGE UP (ref 0–0.99)
HCV AB SERPL-IMP: SIGNIFICANT CHANGE UP

## 2021-02-06 PROCEDURE — 99217: CPT

## 2021-02-06 PROCEDURE — 70553 MRI BRAIN STEM W/O & W/DYE: CPT | Mod: 26,MA

## 2021-02-06 PROCEDURE — 99223 1ST HOSP IP/OBS HIGH 75: CPT | Mod: GC

## 2021-02-06 RX ORDER — MORPHINE SULFATE 50 MG/1
4 CAPSULE, EXTENDED RELEASE ORAL ONCE
Refills: 0 | Status: DISCONTINUED | OUTPATIENT
Start: 2021-02-06 | End: 2021-02-06

## 2021-02-06 RX ORDER — VANCOMYCIN HCL 1 G
1000 VIAL (EA) INTRAVENOUS EVERY 12 HOURS
Refills: 0 | Status: DISCONTINUED | OUTPATIENT
Start: 2021-02-07 | End: 2021-02-08

## 2021-02-06 RX ORDER — VANCOMYCIN HCL 1 G
VIAL (EA) INTRAVENOUS
Refills: 0 | Status: DISCONTINUED | OUTPATIENT
Start: 2021-02-06 | End: 2021-02-08

## 2021-02-06 RX ORDER — PIPERACILLIN AND TAZOBACTAM 4; .5 G/20ML; G/20ML
3.38 INJECTION, POWDER, LYOPHILIZED, FOR SOLUTION INTRAVENOUS EVERY 8 HOURS
Refills: 0 | Status: DISCONTINUED | OUTPATIENT
Start: 2021-02-06 | End: 2021-02-08

## 2021-02-06 RX ORDER — PIPERACILLIN AND TAZOBACTAM 4; .5 G/20ML; G/20ML
3.38 INJECTION, POWDER, LYOPHILIZED, FOR SOLUTION INTRAVENOUS ONCE
Refills: 0 | Status: COMPLETED | OUTPATIENT
Start: 2021-02-06 | End: 2021-02-06

## 2021-02-06 RX ORDER — VANCOMYCIN HCL 1 G
1000 VIAL (EA) INTRAVENOUS ONCE
Refills: 0 | Status: COMPLETED | OUTPATIENT
Start: 2021-02-06 | End: 2021-02-06

## 2021-02-06 RX ADMIN — Medication 1: at 19:12

## 2021-02-06 RX ADMIN — Medication 15 MILLIGRAM(S): at 20:08

## 2021-02-06 RX ADMIN — PIPERACILLIN AND TAZOBACTAM 25 GRAM(S): 4; .5 INJECTION, POWDER, LYOPHILIZED, FOR SOLUTION INTRAVENOUS at 22:20

## 2021-02-06 RX ADMIN — LISINOPRIL 5 MILLIGRAM(S): 2.5 TABLET ORAL at 17:37

## 2021-02-06 RX ADMIN — SIMVASTATIN 20 MILLIGRAM(S): 20 TABLET, FILM COATED ORAL at 22:19

## 2021-02-06 RX ADMIN — Medication 250 MILLIGRAM(S): at 17:15

## 2021-02-06 RX ADMIN — Medication 15 MILLIGRAM(S): at 02:00

## 2021-02-06 RX ADMIN — PIPERACILLIN AND TAZOBACTAM 200 GRAM(S): 4; .5 INJECTION, POWDER, LYOPHILIZED, FOR SOLUTION INTRAVENOUS at 16:31

## 2021-02-06 RX ADMIN — Medication 5 DROP(S): at 06:07

## 2021-02-06 RX ADMIN — Medication 15 MILLIGRAM(S): at 14:30

## 2021-02-06 RX ADMIN — MORPHINE SULFATE 4 MILLIGRAM(S): 50 CAPSULE, EXTENDED RELEASE ORAL at 17:37

## 2021-02-06 RX ADMIN — Medication 15 MILLIGRAM(S): at 08:30

## 2021-02-06 NOTE — PROGRESS NOTE ADULT - SUBJECTIVE AND OBJECTIVE BOX
ENT ISSUE/POD: chronic OM    HPI: 56 yo male with ear pain/headache, feels improved with pain medications. Was started on floxin gtts. Pending MRI        PAST MEDICAL & SURGICAL HISTORY:  No pertinent past medical history    No significant past surgical history      Allergies    No Known Allergies    Intolerances      MEDICATIONS  (STANDING):  dextrose 40% Gel 15 Gram(s) Oral once  dextrose 5%. 1000 milliLiter(s) (50 mL/Hr) IV Continuous <Continuous>  dextrose 5%. 1000 milliLiter(s) (100 mL/Hr) IV Continuous <Continuous>  dextrose 50% Injectable 25 Gram(s) IV Push once  dextrose 50% Injectable 12.5 Gram(s) IV Push once  dextrose 50% Injectable 25 Gram(s) IV Push once  glucagon  Injectable 1 milliGRAM(s) IntraMuscular once  insulin lispro (ADMELOG) corrective regimen sliding scale   SubCutaneous three times a day before meals  lisinopril 5 milliGRAM(s) Oral daily  ofloxacin 0.3% Solution 5 Drop(s) Left Ear two times a day  simvastatin 20 milliGRAM(s) Oral at bedtime    MEDICATIONS  (PRN):  acetaminophen   Tablet .. 975 milliGRAM(s) Oral every 6 hours PRN Mild Pain (1 - 3), Moderate Pain (4 - 6)  ketorolac   Injectable 15 milliGRAM(s) IV Push every 6 hours PRN Moderate Pain (4 - 6)      ROS:   ENT: all negative except as noted in HPI   Pulm: denies SOB, cough, hemoptysis  Neuro: denies numbness/tingling, loss of sensation  Endo: denies heat/cold intolerance, excessive sweating      Vital Signs Last 24 Hrs  T(C): 36.7 (06 Feb 2021 08:17), Max: 36.9 (05 Feb 2021 11:33)  T(F): 98.1 (06 Feb 2021 08:17), Max: 98.5 (05 Feb 2021 11:33)  HR: 64 (06 Feb 2021 08:17) (53 - 68)  BP: 149/80 (06 Feb 2021 08:17) (117/62 - 149/80)  BP(mean): --  RR: 18 (06 Feb 2021 04:09) (16 - 20)  SpO2: 100% (06 Feb 2021 08:17) (97% - 100%)                          13.4   13.74 )-----------( 375      ( 04 Feb 2021 21:28 )             40.9    02-05    140  |  106  |  7   ----------------------------<  111<H>  4.3   |  24  |  0.64    Ca    9.2      05 Feb 2021 06:18    TPro  7.1  /  Alb  3.6  /  TBili  0.2  /  DBili  x   /  AST  18  /  ALT  28  /  AlkPhos  61  02-05       PHYSICAL EXAM:  Gen: NAD  Skin: No rashes, bruises, or lesions  Head: Normocephalic, Atraumatic  Face: no edema, erythema, or fluctuance. Parotid glands soft without mass  Eyes: no scleral injection  Ears: Right - ear canal clear, TM with extensive retraction and thickening, without effusion or erythema. No evidence of any fluid drainage. No mastoid tenderness, erythema, or ear bulging            Left - ear canal clear, TM with extensive retraction and thickening, scant clear otorrhea, without effusion or erythema. No mastoid tenderness, erythema, or ear bulging  Nose: Nares bilaterally patent, no discharge  Mouth: No Stridor / Drooling / Trismus.  Mucosa moist, tongue/uvula midline, oropharynx clear  Neck: Flat, supple, no lymphadenopathy, trachea midline, no masses  Lymphatic: No lymphadenopathy  Resp: breathing easily, no stridor  Neuro: facial nerve intact, no facial droop

## 2021-02-06 NOTE — H&P ADULT - NSHPPHYSICALEXAM_GEN_ALL_CORE
pt. seen and examined, in moderate distress 2/2 HA    Vital Signs Last 24 Hrs  T(C): 36.4 (07 Feb 2021 01:30), Max: 36.7 (06 Feb 2021 08:17)  T(F): 97.6 (07 Feb 2021 01:30), Max: 98.1 (06 Feb 2021 08:17)  HR: 78 (07 Feb 2021 01:30) (64 - 78)  BP: 154/88 (07 Feb 2021 01:30) (148/79 - 167/82)  BP(mean): --  RR: 18 (07 Feb 2021 01:30) (18 - 18)  SpO2: 99% (07 Feb 2021 01:30) (98% - 100%)    heent: nc/at , no pallor  neck: supple, no JVD  lungs: B/L clear, no w/r/r  heart: s1s2 nml  abd: soft, NABS, NT/Nd  ext: no e/c/c, pulses 2+  neuro: aaox3 , HA ++, no focal deficit

## 2021-02-06 NOTE — CONSULT NOTE ADULT - PROVIDER SPECIALTY LIST ADULT
Johanny Brown 1941 
[id number] Situation: 
Verbal report received from: Daylin Louis RN Procedure: Procedure(s): ESOPHAGOGASTRODUODENOSCOPY (EGD) Background: 
 
Preoperative diagnosis: ESOPHAGITIS, HEMATEMESIS Postoperative diagnosis: antral ulcer :  Dr. Arron Braxton Assistant(s): Endoscopy Technician-1: Linsey Armando Endoscopy RN-1: Cody Sanchez RN Specimens: * No specimens in log * H. Pylori  no Assessment: 
Intra-procedure medications Anesthesia gave intra-procedure sedation and medications, see anesthesia flow sheet yes Intravenous fluids: NS@ Maldonado Comes Vital signs stable Abdominal assessment: round and soft Recommendation: 
Discharge patient per MD order. Family or Friend Permission to share finding with family or friend yes Endoscopy discharge instructions have been reviewed and given to patient and spouse. The patient and spouse verbalized understanding and acceptance of instructions.
ENT
Infectious Disease
Neurology

## 2021-02-06 NOTE — H&P ADULT - HISTORY OF PRESENT ILLNESS
55M hx of DMT2, HLD, HTN, Covid-19 (mild in Oct 2020) present to ED due to persistent left sided headache for 1 month. Initially diffuse headache, seen by ENT 2 weeks ago, dx with mastoiditis and treated with augmentin x 10 day. Reports some improvement in the headache but still with significant pain periauricular and radiating to the left side of the head.   He also endorses decreased appetite over past months. Denies fevers, chills, nausea, vomiting, diarrhea.     In the ED, VSS, seen by Neuro and ENT in CDU.  He had a CTH/neck which showed the mastoid opacifications.   MRI showed infiltrative proces in to the left skull base and temporal bone concerning for skull base OM 2/2 chronic otomastoiditis vs neoplasm.     In the ED, he reports this has never happened before. No recent trauma or injury. He works as a , originally from [facility]. Lives with family all healthy.

## 2021-02-06 NOTE — CONSULT NOTE ADULT - ATTENDING COMMENTS
Patient seen and examined  Above verified  Agree with above unless as noted below.  55M hx of DMT2, HLD, HTN, Covid-19 (mild in Oct 2020) present to ED due to persistent left sided headache for 1 month. Initially treated for mastoiditis with 10 days of augmentin, now found to have infiltrative process on MRI concerning for skull base OM vs neoplasm.  Patient symptoms uunresponsive and progressive despite 10 days of po augmentin  ? Need fro debridement  also in view of OM will need long (6-8 week) course and obtaining tissue and microbiology will be immensely helpful in antimicrobial selection    Rec:  1) ENT evaluation ? debridement v biopsy  2) Monitor clinically for any s/s intracrnail extenion or thrmobosis  3) Check blood Cx  4) check nasal MRSA PCR  5) empiric Vanco + zosyn  monitor vanco trough and renal function  6) Optimize DM control    Will tailor plan for ID issues  per course,results.Will defer to primary team on management of other issues.  Assessment, plan and recommendations as detailed above were discussed with the ER team.  Will Follow.  Beeper [phone] [facility] [id number].   Wknd/afterhours/No response-[phone] or Fellow on call
I performed a history and physical examination of the patient and discussed the management of the patient with the resident. I reviewed the resident's note and agree with the documented findings and plan of care with the following additions/exceptions.    dos 2/5/21    saw pt after he retruned from leaving the ED.  he points to his left ear as the area where he feels pain, and with a circular region around the ear. but says that it feels like it starts in the ear. says the pain is throbbing. worse with valsalva maneuvers.    on exam alert, fluent, no dysarthria. dec sens L vertex to LT but says it is inc senstion L above brow. same LT sensation rest of face. eomi. tongue midline.  limbs symmetric. no dysmetria. gait steady.  no vesicles in ears  pain when I pull on L pinna.    hct reviewed and lookd fine. but it was noted that there is partial opacification of L mastoid air cells and also adenoidal tissue was prominent.    im concerned for ear infection and mastoiditis.  I think the pain hes referring to is less head and more ear related  given the time course and his diabetes hx im concerned for fungal infection  rec ent consult and aggressive w/u   rec r/o venous sinus thrombosis with CTA/V  consider mri brain with contrast pending his course
Only partial mastoid opacification on the left, likely chronic disease process.  has appt with ENT on monday at 8:30 AM, would keep in case discharged over the weekend so he can follow with otology.

## 2021-02-06 NOTE — ED ADULT NURSE REASSESSMENT NOTE - NS ED NURSE REASSESS COMMENT FT1
Pt transported to floor via wheel chair with transport with own belongings. Safety and comfort maintained.

## 2021-02-06 NOTE — PROGRESS NOTE ADULT - ASSESSMENT
55 year old M presents to ED c/o worsening L sided headache radiating to neck and ear. Last seen in ED on 1/23 and found to have acute left sided mastoiditis, d/c'd with augmentin and toradol and told to follow up as an outpatient with ENT this monday however he returns with persistent left ear pain. On exam pt noted to have B/L TM retractions and thickening with scant clear otorrhea on the left side. This is likely 2/2 chronic ear disease.

## 2021-02-06 NOTE — CONSULT NOTE ADULT - SUBJECTIVE AND OBJECTIVE BOX
Patient is a 55y old  Male who presents with a chief complaint of left sided head ache    HPI: 55M hx of DMT2, HLD, HTN, Covid-19 (mild in Oct 2020) present to ED due to persistent left sided headache for 1 month. Initially diffuse headache, seen by ENT 2 weeks ago, dx with mastoiditis and treated with augmentin x 10 day. Reports some improvement in the headache but still with significant pain periauricular and radiating to the left side of the head.   He also endorses decreased appetite over past months. Denies fevers, chills, nausea, vomiting, diarrhea.     In the ED, VSS, seen by Neuro and ENT in CDU.  He had a CTH/neck which showed the mastoid opacifications.   MRI showed infiltrative proces in to the left skull base and temporal bone concerning for skull base OM 2/2 chronic otomastoiditis vs neoplasm.     In the ED, he reports this has never happened before. No recent trauma or injury. He works as a , originally from [facility]. Lives with family all healthy.     prior hospital charts reviewed [  ]  primary team notes reviewed [ x ]  other consultant notes reviewed [ x ]    PAST MEDICAL & SURGICAL HISTORY:  HTN  DMT2  HLD  No significant past surgical history      Allergies  No Known Allergies    ANTIMICROBIALS (past 90 days)  MEDICATIONS  (STANDING):      ANTIMICROBIALS:    piperacillin/tazobactam IVPB. 3.375 once  piperacillin/tazobactam IVPB.. 3.375 every 8 hours  vancomycin  IVPB 1000 once  vancomycin  IVPB      OTHER MEDS: MEDICATIONS  (STANDING):  acetaminophen   Tablet .. 975 every 6 hours PRN  dextrose 40% Gel 15 once  dextrose 50% Injectable 25 once  dextrose 50% Injectable 12.5 once  dextrose 50% Injectable 25 once  glucagon  Injectable 1 once  insulin lispro (ADMELOG) corrective regimen sliding scale  three times a day before meals  ketorolac   Injectable 15 every 6 hours PRN  lisinopril 5 daily  simvastatin 20 at bedtime    SOCIAL HISTORY:   Lives with wife. Works as , now off for 1 year. From [facility], immigrated 2008.     FAMILY HISTORY: No pertinent family hx in parents.     REVIEW OF SYSTEMS  [  ] ROS unobtainable because:    [x  ] All other systems negative except as noted below:	    Constitutional:  [ ] fever [ ] chills  [ ] weight loss  [ ] weakness  Skin:  [ ] rash [ ] phlebitis	  Eyes: [ ] icterus [ ] pain  [ ] discharge	  ENMT: [ ] sore throat  [ ] thrush [ ] ulcers [ ] exudates  Respiratory: [ ] dyspnea [ ] hemoptysis [ ] cough [ ] sputum	  Cardiovascular:  [ ] chest pain [ ] palpitations [ ] edema	  Gastrointestinal:  [ ] nausea [ ] vomiting [ ] diarrhea [ ] constipation [ ] pain	  Genitourinary:  [ ] dysuria [ ] frequency [ ] hematuria [ ] discharge [ ] flank pain  [ ] incontinence  Musculoskeletal:  [ ] myalgias [ ] arthralgias [ ] arthritis  [ ] back pain  Neurological:  [ ] headache [ ] seizures  [ ] confusion/altered mental status  Psychiatric:  [ ] anxiety [ ] depression	  Hematology/Lymphatics:  [ ] lymphadenopathy  Endocrine:  [ ] adrenal [ ] thyroid  Allergic/Immunologic:	 [ ] transplant [ ] seasonal    Vital Signs Last 24 Hrs  T(F): 98 (02-06-21 @ 16:09), Max: 98.5 (02-05-21 @ 11:33)  Vital Signs Last 24 Hrs  HR: 68 (02-06-21 @ 16:09) (56 - 69)  BP: 148/79 (02-06-21 @ 16:09) (121/66 - 150/77)  RR: 18 (02-06-21 @ 04:09)  SpO2: 100% (02-06-21 @ 16:09) (97% - 100%)  Wt(kg): --    EXAM:  Constitutional: Not in acute distress  Head: left sided erythema around left ear  Eyes: pupils bilaterally reactive to light. No icterus.  Oral cavity: Clear, no lesions  Neck: No neck vein distension noted  RS: Chest clear to auscultation bilaterally. No wheeze/rhonchi/crepitations.  CVS: S1, S2 heard. Regular rate and rhythm. No murmurs/rubs/gallops.  Abdomen: Soft. No guarding/rigidity/tenderness.  : No acute abnormalities  Extremities: Warm. No pedal edema  Skin: No lesions noted  Vascular: No evidence of phlebitis  Neuro: Alert, oriented to time/place/person                          13.4   13.74 )-----------( 375      ( 04 Feb 2021 21:28 )             40.9     02-05    140  |  106  |  7   ----------------------------<  111<H>  4.3   |  24  |  0.64    Ca    9.2      05 Feb 2021 06:18    TPro  7.1  /  Alb  3.6  /  TBili  0.2  /  DBili  x   /  AST  18  /  ALT  28  /  AlkPhos  61  02-05    MICROBIOLOGY:  Culture - Group A Streptococcus (collected 05 Feb 2021 10:03)  Source: .Throat  Preliminary Report (06 Feb 2021 08:54):    No Streptococcus pyogenes (Group A) isolated      Rapid RVP Result: NotDetec (02-05 @ 04:01)    RADIOLOGY:  imaging below personally reviewed    < from: MR Head w/wo IV Cont (02.06.21 @ 09:46) >  IMPRESSION:  A diffuse infiltrative process involves the left skull base-petrous portion of the temporal bone, with extension into the soft tissues below the skull base, with distribution as described. 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 An infiltrating neoplasm or pseudotumor could appear similar radiographically.    < from: CT Angio Head w/ IV Cont (02.05.21 @ 19:08) >  IMPRESSION:  CTA/ CTV HEAD: No high-grade stenosis or occlusion of the major proximal arterial branches. No evidence of intracranial dissection or AVM. No venous sinus or cortical vein thrombosis.  Partial opacification of the left middle ear and  mastoid air cells. Clinical correlation with ENT is recommended.    < from: CT Neck Soft Tissue w/ IV Cont (02.05.21 @ 04:19) >  CT Neck: Nonspecific prominence of the adenoidal soft tissue, palatine, and lingual tonsils. Cystic foci within the inferior aspects of the bilateral palatine as well as within the right lingual tonsils, nonspecific, but may represent retention cysts. Correlation with direct visualization is recommended.    < from: Xray Chest 1 View AP/PA (02.05.21 @ 06:20) >    IMPRESSION:  Bilateral patchy opacities.

## 2021-02-06 NOTE — ED CDU PROVIDER SUBSEQUENT DAY NOTE - ATTENDING CONTRIBUTION TO CARE
MD Julio:  I have personally performed a face to face diagnostic evaluation on this patient with the PA.  I have reviewed the ACP note and agree with the history, exam, and plan of care, except as noted.  History and Exam by me shows a 56 yo F, c/o L mastoid pain.  Duration:  3wks.  Context: recently diagnosed with sinusitis/mastoiditis, but has not responded to PO abx and is having persistent pain and fevers.    In the ED he underwent CT/CTA head with IV contrastr that showed masoititis, and no evidence of aneurysm or dural sinus thrombosis.  Neurology consulted, who recommended MRI.  ENT consulted, who are recommending continuous IV abx.   VS: wnl.  Physical Exam: adult M, NAD, NCAT.  +L mastoid TTP w/o swelling or ear bowing.  PERRL, EOMI, neck supple, RRR, CTA B, Abd: s/nd/nt, Ext: no edema, Neuro: AAOx3, ambulates w/o diff, strength 5/5 & symmetric throughout.  CDU RESULTS:   MRI showed osteomyelitis of the skull.  Impression:  Osteomyelitis &/or mastoiditis of L petrous temporal bone  Plan:  admit for IV abx, ID consult and ENT following.

## 2021-02-06 NOTE — ED CDU PROVIDER SUBSEQUENT DAY NOTE - HISTORY
Pt resting comfortably. NAD. No complaints. VSS. Headache 1/10 on pain scale, declining medications currently, no new numbness, tingling, weakness, changes in vision will continue to monitor. -Meme Quintanilla PA-C

## 2021-02-06 NOTE — ED ADULT NURSE REASSESSMENT NOTE - NS ED NURSE REASSESS COMMENT FT1
Received pt from DARIAN Morales , received pt alert and responsive, oriented x4, denies any respiratory distress, SOB, or difficulty breathing. Pt transferred to CDU for L ear pain. Pt MRI completed and is currently pending transport to floor. Pt aware of plan of car.  Pt is currently resting comfortably on stretcher. IV in place, patent and free of signs of infiltration, V/S stable, pt afebrile. Pt educated on unit and unit rules, instructed patient to notify RN of any needed assistance, Pt verbalizes understanding, Call bell placed within reach. Safety maintained. Will continue to monitor.

## 2021-02-06 NOTE — ED ADULT NURSE REASSESSMENT NOTE - ANCILLARY STATUS
History:  Diet- Good eater, well balanced.  Milk: 2-3 glasses per day.  Fatigue-Nightmares, enuresis- none  Sexual Development- ramiro 3  Concerns- none    Growth-School Progress:  School- finished 6th grade A,B,C  Sports- soccer  Friends- lots    Visit Vitals  BP 98/66   Ht 4' 10.5\" (1.486 m)   Wt 41.4 kg   BMI 18.75 kg/m²     GENERAL: The patient is awake, alert and interactive, in no acute distress. Smiling and playful.   HEENT: Atraumatic, normocephalic. Pupils: Equal, round, and reactive to light bilaterally. TMs (tympanic membranes) are within normal limits. Nares are patent. Oropharynx and mucous membranes are moist. Tonsils are normal in size and appearance.   NECK: Supple without lymphadenopathy.  LUNGS: Clear to auscultation bilaterally. No wheezes, rales, or rhonchi.  CHEST: Regular rate and rhythm without murmurs, rubs, or gallops.  ABDOMEN: Soft, nontender, and nondistended, with normal abdominal bowel sounds. No hepatosplenomegaly.  EXTREMITIES: Normal strength and tone. Capillary refill is less than 2 seconds.  NEUROLOGIC: Grossly nonfocal.  Deep tendon reflexes 2+ bilaterally.  SKIN: No rashes.  SPINE: No scoliosis noted on flexion.  Genitourinary Ramiro 3 female genitalia.    Diagnosis:  Normal growth and development    Immunizations: Given per EMR (electronic medical record) The parents were counseled about each component of the vaccines, including possible side effects.    Education:  Correct diet  Bedtime  Discipline  Puberty progress  Early sex education  Smoking, alcohol, drugs    
MRI pending, neuro checks pain management.
radiology results pending
Admitted pending transport, IV Zosyn and Vanco., Ocuflox Q12.

## 2021-02-06 NOTE — ED CDU PROVIDER DISPOSITION NOTE - CLINICAL COURSE
55 year old M presents to ED c/o worsening  L sided headache radiating to neck and ear. Pt was seen here 2 weeks ago and found to have mastoiditis on CT and DC'd home with Augmentin for 10 days and f/u with ENT next Monday but pain worsened and pt couldn't wait until then. Pt states he completed Abx course. Patient has been taking tylenol and toradol everyday without pain relief.   pt denies any fever, chills, dizziness, cp, cough, sob, abdominal pain, n/v/d, discharge from ear, trouble opening his mouth    In ED pt has unremarkable labs, CTA H/n remarkable for partial opacification of L middle ear and mastoid cells, Neuro recommending MRI w/w/o contrast with thin cuts through IAC/ cavernous sinus to better evaluate for osteo/mastoiditis/ Central venous thrombosis. Less concerned for GCA per neruo, ENT recommended ofloxacin drops to the left ear, concerned for chronic process, pain controlled with toradol, sent to CDU for MRI.      In CDU_________. Neuro recommended____, ENT recommended________. 55 year old M presents to ED c/o worsening  L sided headache radiating to neck and ear. Pt was seen here 2 weeks ago and found to have mastoiditis on CT and DC'd home with Augmentin for 10 days and f/u with ENT next Monday but pain worsened and pt couldn't wait until then. Pt states he completed Abx course. Patient has been taking tylenol and toradol everyday without pain relief.   pt denies any fever, chills, dizziness, cp, cough, sob, abdominal pain, n/v/d, discharge from ear, trouble opening his mouth    In ED pt has unremarkable labs, CTA H/n remarkable for partial opacification of L middle ear and mastoid cells, Neuro recommending MRI w/w/o contrast with thin cuts through IAC/ cavernous sinus to better evaluate for osteo/mastoiditis/ Central venous thrombosis. Less concerned for GCA per neruo, ENT recommended ofloxacin drops to the left ear, concerned for chronic process, pain controlled with toradol, sent to CDU for MRI.      In CDU, pt with continued pain- controlled with Toradol. MRI done- +osteo of skull base. ENT recommending cipro and ID consult. per ID- recommending IV abx and debridement of tissue. pt admitted to medicine for IV abx and probable debridement.

## 2021-02-06 NOTE — ED ADULT NURSE REASSESSMENT NOTE - GENERAL PATIENT STATE
comfortable appearance/cooperative/improvement verbalized/resting/sleeping/smiling/interactive
comfortable appearance/cooperative/improvement verbalized/resting/sleeping
comfortable appearance/cooperative/resting/sleeping/smiling/interactive

## 2021-02-06 NOTE — PROGRESS NOTE ADULT - SUBJECTIVE AND OBJECTIVE BOX
MRI:  EXAM: MR BRAIN WAW IC      PROCEDURE DATE: 02/06/2021    INTERPRETATION: HISTORY: Headache. Mastoiditis.    Description: A MRI of the internal auditory canals and brain with and without gadolinium contrast was performed.    Comparison: CT study 02/05/2021.    Through the internal auditory canals, thin section high resolution axial and coronal T1 pre- and postcontrast, and axial FIESTA (or SPACE) series were performed. Through the brain, sagittal T1, axial diffusion, SPGR, T2, FLAIR, GRE, and T1 postcontrast series were obtained.    10 cc intravenous Gadavist gadolinium contrast was administered, 0 cc contrast was discarded.    A diffuse infiltrative process with bone marrow edema and enhancement involves the left skull base-petrous portion of the temporal bone. The left skull base process involves the left carotid canal and left jugular fossa. The abnormal enhancement extends into the soft tissues below the left skull base, with involvement of the left carotid space with carotid encasement, left longus coli muscle, and deep left nasopharyngeal soft tissues and left retropharyngeal soft tissues. Soft tissue thickening and enhancement involves the left middle ear cavity and left mastoid air cells, with bony sclerosis noted on the prior CT, consistent with chronic otomastoiditis. The soft tissue process abuts the left cavernous sinus and Meckel's cave, without gross evidence for direct involvement at this time. There is no evidence for cavernous sinus thrombosis.    There is no evidence for brain parenchymal metastatic disease, acute infarct, acute hemorrhage, or hydrocephalus. Mild chronic white matter changes and mild cerebral volume loss are noted.    The right middle ear cavity is grossly clear. The right mastoid air cells are underpneumatized and sclerotic suggesting chronic mastoiditis.    IMPRESSION:    A diffuse infiltrative process involves the left skull base-petrous portion of the temporal bone, with extension into the soft tissues below the skull base, with distribution as described. 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 An infiltrating neoplasm or pseudotumor could appear similar radiographically.

## 2021-02-06 NOTE — H&P ADULT - PROBLEM SELECTOR PLAN 1
MRI shows infiltrating mass at base of skull   -possible OM of skull bone   -seen by ID  started on Iv abx   -recommend ENT eval for either dbridement or biopsy Statement Selected

## 2021-02-06 NOTE — PROGRESS NOTE ADULT - PROBLEM SELECTOR PLAN 1
-Admit to medicine for IV abx  -Infectious disease consult  -Pain control  ENT will cont to follow -Ciprofloxacin  -Infectious disease consult  -Pain control  Pending dispo  ENT will cont to follow

## 2021-02-06 NOTE — ED CDU PROVIDER DISPOSITION NOTE - ATTENDING CONTRIBUTION TO CARE
MD Julio:  I have personally performed a face to face diagnostic evaluation on this patient with the PA.  I have reviewed the ACP note and agree with the history, exam, and plan of care, except as noted.  History and Exam by me shows a 54 yo F, c/o L mastoid pain.  Duration:  3wks.  Context: recently diagnosed with sinusitis/mastoiditis, but has not responded to PO abx and is having persistent pain and fevers.    In the ED he underwent CT/CTA head with IV contrastr that showed masoititis, and no evidence of aneurysm or dural sinus thrombosis.  Neurology consulted, who recommended MRI.  ENT consulted, who are recommending continuous IV abx.   VS: wnl.  Physical Exam: adult M, NAD, NCAT.  +L mastoid TTP w/o swelling or ear bowing.  PERRL, EOMI, neck supple, RRR, CTA B, Abd: s/nd/nt, Ext: no edema, Neuro: AAOx3, ambulates w/o diff, strength 5/5 & symmetric throughout.  CDU RESULTS:   MRI showed osteomyelitis of the skull.  Impression:  Osteomyelitis &/or mastoiditis of L petrous temporal bone  Plan:  admit for IV abx, ID consult and ENT following.

## 2021-02-06 NOTE — ED ADULT NURSE REASSESSMENT NOTE - COMFORT CARE
wait time explained/warm blanket provided
ambulated to bathroom/darkened lights/plan of care explained/po fluids offered/repositioned/warm blanket provided
ambulated to bathroom/darkened lights/meal provided/plan of care explained/po fluids offered/repositioned/warm blanket provided

## 2021-02-06 NOTE — ED CDU PROVIDER SUBSEQUENT DAY NOTE - PROGRESS NOTE DETAILS
Pt resting comfortably. NAD. No complaints. VSS. pt states HA Is improved s/p toradol, previously sleeping prior to examination. no numbness, tingling, weakness, strength and sensations are intact will cont to monitor. CDU NOTE NNEKA Polk: pt resting, L sided head pain only relieved with Toradol, will wait for next time he can receive IV Toradol. no other complaints. NAD VSS. +left TM erythema. +ttp over left mastoid, left tragus extending towards cheek and neck. ttp L side head. CDU NOTE NNEKA Polk: pt resting comfortably, L sided head pain only relieved with Toradol, pain controlled for now. NAD VSS. CDU NOTE NNEKA Polk: MRI- +osteomyelitis of skull base. spoke with ENT who spoke with their attending- recommend Cipro and ID consult. spoke with ID- questioning whether pt will need biopsy done and recommends waiting on abx until he speaks with his attending, will call back with recs.  as per Dr. Julio, will admit to medicine. CDU NOTE NNEKA Polk: pt seen by ID attending- recommend admission for IV abx and debridement. spoke with ENT- informed PA of recommendation from ID for debridement. she will discuss with her attending but that will not be done for a few days and would be done by specialist. pt to be admitted to medicine.

## 2021-02-06 NOTE — ED CDU PROVIDER DISPOSITION NOTE - NSFOLLOWUPINSTRUCTIONS_ED_ALL_ED_FT
- stay hydrated.   -take all medications as prescribed, continue with ofloxacin 5 drops to left ear twice a day for the next 6 days.   -Follow up with ENT Dr. Macias as scheduled on Monday  -Follow up with neurology ___________  - take tylenol 975mg and ibuprofen 600mg every 6 hours as needed for pain-take with meals.  - follow up with your pcp in 1-2 days.  - return if symptoms worsen, fever, weakness, numbness/tingling, blurred vision, difficulty ambulating and all other concerns.

## 2021-02-06 NOTE — PROGRESS NOTE ADULT - PROBLEM SELECTOR PLAN 1
- Floxin gtt 5 drops BID x7days to the left ear  - F/u neuro recs and MRI   - Plan for CDU overnight   - If patient is able to go home this weekend, he will follow up with ear specialist Dr. Macias as an outpatient on Monday

## 2021-02-06 NOTE — ED CDU PROVIDER SUBSEQUENT DAY NOTE - MEDICAL DECISION MAKING DETAILS
Impression:  Osteomyelitis &/or mastoiditis of L petrous temporal bone  Plan:  admit for IV abx, ID consult and ENT following.

## 2021-02-06 NOTE — CONSULT NOTE ADULT - ASSESSMENT
HPI: 55M hx of DMT2, HLD, HTN, Covid-19 (mild in Oct 2020) present to ED due to persistent left sided headache for 1 month. Initially treated for mastoiditis with 10 days of augmentin, now found to have infiltrative process on MRI concerning for skull base OM vs neoplasm.    #Skull base lesion - r/o OM. He presents with peristant pain and leukocytosis to 13.7k. With mild improvement after 10 days of Augmentin.  MRI as above concerning for skull base OM into temporal bone.   Concerning for spreading of initial infection into bone, likely bacterial with possible resistance, due to temporality.    Rec:  - start vanc 1g q12, monitor trough pre 4th dose  - start zosyn 3.375mg q8   - f/u ENT about possible debridement vs biopsy  - send blood cultures  - monitor leukocytosis  - monitor renal function daily    Damaso Virgen MD  Fellow, Infectious Diseases, PGY-4  Pager: [phone]  Before 9am or after 5pm/Weekends: Call [phone]

## 2021-02-07 DIAGNOSIS — H70.92 UNSPECIFIED MASTOIDITIS, LEFT EAR: ICD-10-CM

## 2021-02-07 DIAGNOSIS — R51.9 HEADACHE, UNSPECIFIED: ICD-10-CM

## 2021-02-07 DIAGNOSIS — E11.9 TYPE 2 DIABETES MELLITUS WITHOUT COMPLICATIONS: ICD-10-CM

## 2021-02-07 LAB
ALBUMIN SERPL ELPH-MCNC: 3.6 G/DL — SIGNIFICANT CHANGE UP (ref 3.3–5)
ALP SERPL-CCNC: 61 U/L — SIGNIFICANT CHANGE UP (ref 40–120)
ALT FLD-CCNC: 53 U/L — HIGH (ref 10–45)
ANION GAP SERPL CALC-SCNC: 10 MMOL/L — SIGNIFICANT CHANGE UP (ref 5–17)
AST SERPL-CCNC: 37 U/L — SIGNIFICANT CHANGE UP (ref 10–40)
BILIRUB SERPL-MCNC: 0.3 MG/DL — SIGNIFICANT CHANGE UP (ref 0.2–1.2)
BUN SERPL-MCNC: 10 MG/DL — SIGNIFICANT CHANGE UP (ref 7–23)
CALCIUM SERPL-MCNC: 9 MG/DL — SIGNIFICANT CHANGE UP (ref 8.4–10.5)
CHLORIDE SERPL-SCNC: 105 MMOL/L — SIGNIFICANT CHANGE UP (ref 96–108)
CO2 SERPL-SCNC: 23 MMOL/L — SIGNIFICANT CHANGE UP (ref 22–31)
CREAT SERPL-MCNC: 0.82 MG/DL — SIGNIFICANT CHANGE UP (ref 0.5–1.3)
GLUCOSE BLDC GLUCOMTR-MCNC: 128 MG/DL — HIGH (ref 70–99)
GLUCOSE BLDC GLUCOMTR-MCNC: 131 MG/DL — HIGH (ref 70–99)
GLUCOSE BLDC GLUCOMTR-MCNC: 155 MG/DL — HIGH (ref 70–99)
GLUCOSE BLDC GLUCOMTR-MCNC: 229 MG/DL — HIGH (ref 70–99)
GLUCOSE SERPL-MCNC: 135 MG/DL — HIGH (ref 70–99)
HCT VFR BLD CALC: 39.4 % — SIGNIFICANT CHANGE UP (ref 39–50)
HCV AB S/CO SERPL IA: 0.12 S/CO — SIGNIFICANT CHANGE UP (ref 0–0.99)
HCV AB SERPL-IMP: SIGNIFICANT CHANGE UP
HGB BLD-MCNC: 13.4 G/DL — SIGNIFICANT CHANGE UP (ref 13–17)
MCHC RBC-ENTMCNC: 29 PG — SIGNIFICANT CHANGE UP (ref 27–34)
MCHC RBC-ENTMCNC: 34 GM/DL — SIGNIFICANT CHANGE UP (ref 32–36)
MCV RBC AUTO: 85.3 FL — SIGNIFICANT CHANGE UP (ref 80–100)
NRBC # BLD: 0 /100 WBCS — SIGNIFICANT CHANGE UP (ref 0–0)
PLATELET # BLD AUTO: 375 K/UL — SIGNIFICANT CHANGE UP (ref 150–400)
POTASSIUM SERPL-MCNC: 4.1 MMOL/L — SIGNIFICANT CHANGE UP (ref 3.5–5.3)
POTASSIUM SERPL-SCNC: 4.1 MMOL/L — SIGNIFICANT CHANGE UP (ref 3.5–5.3)
PROT SERPL-MCNC: 7 G/DL — SIGNIFICANT CHANGE UP (ref 6–8.3)
RBC # BLD: 4.62 M/UL — SIGNIFICANT CHANGE UP (ref 4.2–5.8)
RBC # FLD: 13.8 % — SIGNIFICANT CHANGE UP (ref 10.3–14.5)
SODIUM SERPL-SCNC: 138 MMOL/L — SIGNIFICANT CHANGE UP (ref 135–145)
WBC # BLD: 11.13 K/UL — HIGH (ref 3.8–10.5)
WBC # FLD AUTO: 11.13 K/UL — HIGH (ref 3.8–10.5)

## 2021-02-07 PROCEDURE — 99232 SBSQ HOSP IP/OBS MODERATE 35: CPT

## 2021-02-07 RX ORDER — OXYCODONE AND ACETAMINOPHEN 5; 325 MG/1; MG/1
1 TABLET ORAL EVERY 6 HOURS
Refills: 0 | Status: DISCONTINUED | OUTPATIENT
Start: 2021-02-07 | End: 2021-02-11

## 2021-02-07 RX ORDER — ACETAMINOPHEN 500 MG
1000 TABLET ORAL ONCE
Refills: 0 | Status: COMPLETED | OUTPATIENT
Start: 2021-02-07 | End: 2021-02-07

## 2021-02-07 RX ADMIN — Medication 15 MILLIGRAM(S): at 01:37

## 2021-02-07 RX ADMIN — Medication 250 MILLIGRAM(S): at 05:39

## 2021-02-07 RX ADMIN — PIPERACILLIN AND TAZOBACTAM 25 GRAM(S): 4; .5 INJECTION, POWDER, LYOPHILIZED, FOR SOLUTION INTRAVENOUS at 12:42

## 2021-02-07 RX ADMIN — Medication 1: at 12:41

## 2021-02-07 RX ADMIN — Medication 250 MILLIGRAM(S): at 16:06

## 2021-02-07 RX ADMIN — OXYCODONE AND ACETAMINOPHEN 1 TABLET(S): 5; 325 TABLET ORAL at 18:41

## 2021-02-07 RX ADMIN — Medication 5 DROP(S): at 07:22

## 2021-02-07 RX ADMIN — PIPERACILLIN AND TAZOBACTAM 25 GRAM(S): 4; .5 INJECTION, POWDER, LYOPHILIZED, FOR SOLUTION INTRAVENOUS at 20:26

## 2021-02-07 RX ADMIN — Medication 15 MILLIGRAM(S): at 08:37

## 2021-02-07 RX ADMIN — Medication 400 MILLIGRAM(S): at 05:57

## 2021-02-07 RX ADMIN — SIMVASTATIN 20 MILLIGRAM(S): 20 TABLET, FILM COATED ORAL at 20:26

## 2021-02-07 RX ADMIN — Medication 15 MILLIGRAM(S): at 14:25

## 2021-02-07 RX ADMIN — Medication 15 MILLIGRAM(S): at 20:26

## 2021-02-07 RX ADMIN — LISINOPRIL 5 MILLIGRAM(S): 2.5 TABLET ORAL at 05:38

## 2021-02-07 RX ADMIN — Medication 5 DROP(S): at 16:07

## 2021-02-07 RX ADMIN — PIPERACILLIN AND TAZOBACTAM 25 GRAM(S): 4; .5 INJECTION, POWDER, LYOPHILIZED, FOR SOLUTION INTRAVENOUS at 07:09

## 2021-02-07 RX ADMIN — Medication 2: at 17:25

## 2021-02-07 NOTE — PROGRESS NOTE ADULT - SUBJECTIVE AND OBJECTIVE BOX
Patient is a 55y old  Male who presents with a chief complaint of left sided periauricular pain and sever HA (07 Feb 2021 09:55)    Being followed by ID for mastoiditis,skull OM    Interval history:left aear and pre-auricular pain   No acute events      ROS:  No cough,SOB,CP  No N/V/D./abd pain  No other complaints      Antimicrobials:    piperacillin/tazobactam IVPB.. 3.375 Gram(s) IV Intermittent every 8 hours  vancomycin  IVPB 1000 milliGRAM(s) IV Intermittent every 12 hours  vancomycin  IVPB        Other medications reviewed    Vital Signs Last 24 Hrs  T(C): 36.7 (02-07-21 @ 09:07), Max: 36.7 (02-06-21 @ 11:48)  T(F): 98 (02-07-21 @ 09:07), Max: 98.1 (02-06-21 @ 20:30)  HR: 62 (02-07-21 @ 09:07) (62 - 78)  BP: 136/80 (02-07-21 @ 09:07) (129/75 - 167/82)  BP(mean): --  RR: 18 (02-07-21 @ 09:07) (18 - 18)  SpO2: 96% (02-07-21 @ 09:07) (96% - 100%)    Physical Exam:        HEENT PERRLA EOMI    left ear and preauricualr tenderness  No ear discharge     No oral exudate or erythema    Chest Good AE,CTA    CVS RRR S1 S2 WNl No murmur or rub or gallop    Abd soft BS normal No tenderness no masses    IV site no erythema tenderness or discharge    CNS AAO X 3 no focal    Lab Data:                          13.4   11.13 )-----------( 375      ( 07 Feb 2021 06:27 )             39.4     WBC Count: 11.13 (02-07-21 @ 06:27)  WBC Count: 13.74 (02-04-21 @ 21:28)    02-07    138  |  105  |  10  ----------------------------<  135<H>  4.1   |  23  |  0.82      Creatinine, Serum: 0.82 mg/dL (02-07-21 @ 06:27)  Creatinine, Serum: 0.64 mg/dL (02-05-21 @ 06:18)  Creatinine, Serum: 0.61 mg/dL (02-04-21 @ 21:28)    Ca    9.0      07 Feb 2021 06:27    TPro  7.0  /  Alb  3.6  /  TBili  0.3  /  DBili  x   /  AST  37  /  ALT  53<H>  /  AlkPhos  61  02-07        Culture - Group A Streptococcus (collected 05 Feb 2021 10:03)  Source: .Throat  Final Report (06 Feb 2021 22:39):    No Streptococcus pyogenes (Group A) isolated      < from: MR Head w/wo IV Cont (02.06.21 @ 09:46) >    IMPRESSION:    A diffuse infiltrative process involves the left skull base-petrous portion of the temporal bone, with extension into the soft tissues below the skull base, with distribution as described. 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 An infiltrating neoplasm or pseudotumor could appear similar radiographically.          < end of copied text >  < from: CT Angio Head w/ IV Cont (02.05.21 @ 19:08) >    IMPRESSION:      CTA/ CTV HEAD: No high-grade stenosis or occlusion of the major proximal arterial branches. No evidence of intracranial dissection or AVM. No venous sinus or cortical vein thrombosis.    Partial opacification of the left middle ear and  mastoid air cells. Clinical correlation with ENT is recommended.          < end of copied text >

## 2021-02-07 NOTE — PROGRESS NOTE ADULT - SUBJECTIVE AND OBJECTIVE BOX
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 Pt admitted and started on vanco and zosyn. Pt notes toradol helps up to 4 hours and is left in excruciating pain for two hours until his next dose. Denies n/v, vision changes or hearing loss      PAST MEDICAL & SURGICAL HISTORY:  No pertinent past medical history    No significant past surgical history      Allergies    No Known Allergies    Intolerances      MEDICATIONS  (STANDING):  dextrose 40% Gel 15 Gram(s) Oral once  dextrose 5%. 1000 milliLiter(s) (50 mL/Hr) IV Continuous <Continuous>  dextrose 5%. 1000 milliLiter(s) (100 mL/Hr) IV Continuous <Continuous>  dextrose 50% Injectable 25 Gram(s) IV Push once  dextrose 50% Injectable 12.5 Gram(s) IV Push once  dextrose 50% Injectable 25 Gram(s) IV Push once  glucagon  Injectable 1 milliGRAM(s) IntraMuscular once  insulin lispro (ADMELOG) corrective regimen sliding scale   SubCutaneous three times a day before meals  lisinopril 5 milliGRAM(s) Oral daily  ofloxacin 0.3% Solution 5 Drop(s) Left Ear two times a day  piperacillin/tazobactam IVPB.. 3.375 Gram(s) IV Intermittent every 8 hours  simvastatin 20 milliGRAM(s) Oral at bedtime  vancomycin  IVPB 1000 milliGRAM(s) IV Intermittent every 12 hours  vancomycin  IVPB        MEDICATIONS  (PRN):  acetaminophen   Tablet .. 975 milliGRAM(s) Oral every 6 hours PRN Mild Pain (1 - 3), Moderate Pain (4 - 6)  ketorolac   Injectable 15 milliGRAM(s) IV Push every 6 hours PRN Moderate Pain (4 - 6)    ROS:   ENT: all negative except as noted in HPI   Pulm: denies SOB, cough, hemoptysis  Neuro: denies numbness/tingling, loss of sensation  Endo: denies heat/cold intolerance, excessive sweating      Vital Signs Last 24 Hrs  T(C): 36.7 (07 Feb 2021 09:07), Max: 36.7 (06 Feb 2021 11:48)  T(F): 98 (07 Feb 2021 09:07), Max: 98.1 (06 Feb 2021 20:30)  HR: 62 (07 Feb 2021 09:07) (62 - 78)  BP: 136/80 (07 Feb 2021 09:07) (129/75 - 167/82)  BP(mean): --  RR: 18 (07 Feb 2021 09:07) (18 - 18)  SpO2: 96% (07 Feb 2021 09:07) (96% - 100%)                          13.4   11.13 )-----------( 375      ( 07 Feb 2021 06:27 )             39.4    02-07    138  |  105  |  10  ----------------------------<  135<H>  4.1   |  23  |  0.82    Ca    9.0      07 Feb 2021 06:27    TPro  7.0  /  Alb  3.6  /  TBili  0.3  /  DBili  x   /  AST  37  /  ALT  53<H>  /  AlkPhos  61  02-07       PHYSICAL EXAM:  Gen: NAD  Skin: No rashes, bruises, or lesions  Head: Normocephalic, Atraumatic  Face: no edema, erythema, or fluctuance. Parotid glands soft without mass  Eyes: no scleral injection  Ears: Right - ear canal clear, TM with extensive retraction and thickening, without effusion or erythema. No evidence of any fluid drainage. No mastoid tenderness, erythema, or ear bulging            Left - ear canal clear, TM with extensive retraction and thickening, without effusion or erythema. No mastoid tenderness, erythema, or ear bulging  Nose: Nares bilaterally patent, no discharge  Mouth: No Stridor / Drooling / Trismus.  Mucosa moist, tongue/uvula midline, oropharynx clear  Neck: Flat, supple, no lymphadenopathy, trachea midline, no masses  Lymphatic: No lymphadenopathy  Resp: breathing easily, no stridor  Neuro: facial nerve intact, no facial droop

## 2021-02-07 NOTE — CHART NOTE - NSCHARTNOTEFT_GEN_A_CORE
Justa Nesbitt  MRN: [id number]    Notified by RN that patient is currently having a 8/10 headache. Pt denies visual changes, nausea/vomiting and discomfort. Pt was admitted earlier for this known headache.     Vital Signs Last 24 Hrs  T(C): 36.4 (07 Feb 2021 01:30), Max: 36.7 (06 Feb 2021 08:17)  T(F): 97.6 (07 Feb 2021 01:30), Max: 98.1 (06 Feb 2021 08:17)  HR: 78 (07 Feb 2021 01:30) (64 - 78)  BP: 154/88 (07 Feb 2021 01:30) (121/66 - 167/82)  BP(mean): --  RR: 18 (07 Feb 2021 01:30) (18 - 18)  SpO2: 99% (07 Feb 2021 01:30) (97% - 100%)      Radiology:  < from: MR Head w/wo IV Cont (02.06.21 @ 09:46) >    IMPRESSION:    A diffuse infiltrative process involves the left skull base-petrous portion of the temporal bone, with extension into the soft tissues below the skull base, with distribution as described. 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 An infiltrating neoplasm or pseudotumor could appear similar radiographically.      PE:   Constitutional: NADH,   RS: Chest clear to auscultation bilaterally. No wheeze/rhonchi/crepitations.  CVS: S1, S2 heard. Regular rate and rhythm. No murmurs/rubs/gallops.  Abdomen: Soft. No guarding/rigidity/tenderness.  : No acute abnormalities  Extremities: Warm. No pedal edema      Assessment & Plan:  HPI:  55M hx of DMT2, HLD, HTN, Covid-19 (mild in Oct 2020) present to ED due to persistent left sided headache for 1 month. Initially treated for mastoiditis with 10 days of augmentin, now found to have infiltrative process on MRI concerning for skull base OM vs neoplasm. Pt now presents with a ongoing headache.       IMPRESSION- Ongoing headache   - continue w/standing toradol   - continue to monitor for acute changes, will consider further imaging     -     Susana Morris PA-C  Department of Medicine  #[id number] Justa Nesbitt  MRN: [id number]    Notified by RN that patient is currently having a 8/10 headache. Pt denies visual changes, nausea/vomiting and discomfort. Pt was admitted earlier for this known headache.     Vital Signs Last 24 Hrs  T(C): 36.4 (07 Feb 2021 01:30), Max: 36.7 (06 Feb 2021 08:17)  T(F): 97.6 (07 Feb 2021 01:30), Max: 98.1 (06 Feb 2021 08:17)  HR: 78 (07 Feb 2021 01:30) (64 - 78)  BP: 154/88 (07 Feb 2021 01:30) (121/66 - 167/82)  BP(mean): --  RR: 18 (07 Feb 2021 01:30) (18 - 18)  SpO2: 99% (07 Feb 2021 01:30) (97% - 100%)      Radiology:  < from: MR Head w/wo IV Cont (02.06.21 @ 09:46) >    IMPRESSION:    A diffuse infiltrative process involves the left skull base-petrous portion of the temporal bone, with extension into the soft tissues below the skull base, with distribution as described. 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 An infiltrating neoplasm or pseudotumor could appear similar radiographically.      PE:   Constitutional: NADH,   RS: Chest clear to auscultation bilaterally. No wheeze/rhonchi/crepitations.  CVS: S1, S2 heard. Regular rate and rhythm. No murmurs/rubs/gallops.  Abdomen: Soft. No guarding/rigidity/tenderness.  : No acute abnormalities  Extremities: Warm. No pedal edema      Assessment & Plan:  HPI:  55M hx of DMT2, HLD, HTN, Covid-19 (mild in Oct 2020) present to ED due to persistent left sided headache for 1 month. Initially treated for mastoiditis with 10 days of augmentin, now found to have infiltrative process on MRI concerning for skull base OM vs neoplasm. Pt now presents with a ongoing headache.       IMPRESSION- Ongoing headache   - continue w/standing toradol   - MRI results already known by primary team   - continue to monitor for acute changes, will consider further imaging  - continue to monitor to improvement  - will endorse to the upcoming primary provider   - f/u with primary care team in AM   - nurse aware of management     Susana Morris PA-C  Department of Medicine  #[id number]

## 2021-02-07 NOTE — PROGRESS NOTE ADULT - ASSESSMENT
55 year old M w L sided headache radiating to neck and ear. . On exam pt noted to have B/L TM retractions and thickening with scant clear otorrhea on the left side. MRI revealed skull base osteo prompting admission. Pt now on IV vanco and zosyn, pain poorly controlled w toradol currently

## 2021-02-07 NOTE — PROGRESS NOTE ADULT - ASSESSMENT
55M hx of DMT2, HLD, HTN, Covid-19 (mild in Oct 2020) present to ED due to persistent left sided headache for 1 month. Initially treated for mastoiditis with 10 days of augmentin, now found to have infiltrative process on MRI concerning for skull base OM vs neoplasm.  Patient symptoms uunresponsive and progressive despite 10 days of po augmentin  ? Need fro debridement  also in view of OM will need long (6-8 week) course and obtaining tissue and microbiology will be immensely helpful in antimicrobial selection    Rec:  1) ENT evaluation ? debridement v biopsy  2) Monitor clinically for any s/s intracrnail extenion or thrmobosis  3) Check blood Cx  4) check nasal MRSA PCR  5) empiric Vanco + zosyn  monitor vanco trough and renal function  6) Optimize DM control    Will tailor plan for ID issues  per course,results.Will defer to primary team on management of other issues.  Assessment, plan and recommendations as detailed above were discussed with the medical team   Will Follow.  Beeper [phone] [facility] [id number].   Wknd/afterhours/No response-[phone] or Fellow on call .

## 2021-02-07 NOTE — PROGRESS NOTE ADULT - PROBLEM SELECTOR PLAN 1
MRI shows infiltrating mass at base of skull   -possible OM of skull bone   -seen by ID: started on abx   -recommend ENT eval for either dbridement or biopsy

## 2021-02-07 NOTE — PROGRESS NOTE ADULT - SUBJECTIVE AND OBJECTIVE BOX
Patient is a 55y old  Male who presents with a chief complaint of left sided periauricular pain and sever HA (07 Feb 2021 11:36)    pt. seen and examined, still c/o HA    INTERVAL HPI/OVERNIGHT EVENTS:  T(C): 36.6 (02-08-21 @ 01:19), Max: 36.8 (02-07-21 @ 16:20)  HR: 69 (02-08-21 @ 01:19) (62 - 71)  BP: 141/70 (02-08-21 @ 01:19) (129/75 - 155/78)  RR: 16 (02-08-21 @ 01:19) (16 - 18)  SpO2: 98% (02-08-21 @ 01:19) (96% - 98%)  Wt(kg): --  I&O's Summary    07 Feb 2021 07:01  -  08 Feb 2021 02:09  --------------------------------------------------------  IN: 1237 mL / OUT: 0 mL / NET: 1237 mL        PAST MEDICAL & SURGICAL HISTORY:  No pertinent past medical history    No significant past surgical history        SOCIAL HISTORY  Alcohol:  Tobacco:  Illicit substance use:    FAMILY HISTORY:    REVIEW OF SYSTEMS:  CONSTITUTIONAL: No fever, weight loss, or fatigue  EYES: No eye pain, visual disturbances, or discharge  ENMT:  No difficulty hearing, tinnitus, vertigo; No sinus or throat pain  NECK: No pain or stiffness  RESPIRATORY: No cough, wheezing, chills or hemoptysis; No shortness of breath  CARDIOVASCULAR: No chest pain, palpitations, dizziness, or leg swelling  GASTROINTESTINAL: No abdominal or epigastric pain. No nausea, vomiting, or hematemesis; No diarrhea or constipation. No melena or hematochezia.  GENITOURINARY: No dysuria, frequency, hematuria, or incontinence  NEUROLOGICAL: No headaches, memory loss, loss of strength, numbness, or tremors  SKIN: No itching, burning, rashes, or lesions   LYMPH NODES: No enlarged glands  ENDOCRINE: No heat or cold intolerance; No hair loss  MUSCULOSKELETAL: No joint pain or swelling; No muscle, back, or extremity pain  PSYCHIATRIC: No depression, anxiety, mood swings, or difficulty sleeping  HEME/LYMPH: No easy bruising, or bleeding gums  ALLERY AND IMMUNOLOGIC: No hives or eczema    RADIOLOGY & ADDITIONAL TESTS:    Imaging Personally Reviewed:  [ ] YES  [ ] NO    Consultant(s) Notes Reviewed:  [ ] YES  [ ] NO    PHYSICAL EXAM:  GENERAL: NAD, well-groomed, well-developed  HEAD:  Atraumatic, Normocephalic  EYES: EOMI, PERRLA, conjunctiva and sclera clear  ENMT: No tonsillar erythema, exudates, or enlargement; Moist mucous membranes, Good dentition, No lesions  NECK: Supple, No JVD, Normal thyroid  NERVOUS SYSTEM:  Alert & Oriented X3, Good concentration; Motor Strength 5/5 B/L upper and lower extremities; DTRs 2+ intact and symmetric  CHEST/LUNG: Clear to percussion bilaterally; No rales, rhonchi, wheezing, or rubs  HEART: Regular rate and rhythm; No murmurs, rubs, or gallops  ABDOMEN: Soft, Nontender, Nondistended; Bowel sounds present  EXTREMITIES:  2+ Peripheral Pulses, No clubbing, cyanosis, or edema  LYMPH: No lymphadenopathy noted  SKIN: No rashes or lesions    LABS:                        13.4   11.13 )-----------( 375      ( 07 Feb 2021 06:27 )             39.4     02-07    138  |  105  |  10  ----------------------------<  135<H>  4.1   |  23  |  0.82    Ca    9.0      07 Feb 2021 06:27    TPro  7.0  /  Alb  3.6  /  TBili  0.3  /  DBili  x   /  AST  37  /  ALT  53<H>  /  AlkPhos  61  02-07        CAPILLARY BLOOD GLUCOSE      POCT Blood Glucose.: 131 mg/dL (07 Feb 2021 21:05)  POCT Blood Glucose.: 229 mg/dL (07 Feb 2021 17:19)  POCT Blood Glucose.: 155 mg/dL (07 Feb 2021 12:16)  POCT Blood Glucose.: 128 mg/dL (07 Feb 2021 08:15)            MEDICATIONS  (STANDING):  dextrose 40% Gel 15 Gram(s) Oral once  dextrose 5%. 1000 milliLiter(s) (50 mL/Hr) IV Continuous <Continuous>  dextrose 5%. 1000 milliLiter(s) (100 mL/Hr) IV Continuous <Continuous>  dextrose 50% Injectable 25 Gram(s) IV Push once  dextrose 50% Injectable 12.5 Gram(s) IV Push once  dextrose 50% Injectable 25 Gram(s) IV Push once  glucagon  Injectable 1 milliGRAM(s) IntraMuscular once  insulin lispro (ADMELOG) corrective regimen sliding scale   SubCutaneous three times a day before meals  lisinopril 5 milliGRAM(s) Oral daily  ofloxacin 0.3% Solution 5 Drop(s) Left Ear two times a day  piperacillin/tazobactam IVPB.. 3.375 Gram(s) IV Intermittent every 8 hours  simvastatin 20 milliGRAM(s) Oral at bedtime  vancomycin  IVPB 1000 milliGRAM(s) IV Intermittent every 12 hours  vancomycin  IVPB        MEDICATIONS  (PRN):  ketorolac   Injectable 15 milliGRAM(s) IV Push every 6 hours PRN Moderate Pain (4 - 6)  oxycodone    5 mG/acetaminophen 325 mG 1 Tablet(s) Oral every 6 hours PRN Moderate Pain (4 - 6)      Care Discussed with Consultants/Other Providers [ ] YES  [ ] NO

## 2021-02-08 ENCOUNTER — APPOINTMENT (OUTPATIENT)
Dept: OTOLARYNGOLOGY | Facility: CLINIC | Age: 56
End: 2021-02-08

## 2021-02-08 LAB
A1C WITH ESTIMATED AVERAGE GLUCOSE RESULT: 6.7 % — HIGH (ref 4–5.6)
ANION GAP SERPL CALC-SCNC: 11 MMOL/L — SIGNIFICANT CHANGE UP (ref 5–17)
BUN SERPL-MCNC: 11 MG/DL — SIGNIFICANT CHANGE UP (ref 7–23)
CALCIUM SERPL-MCNC: 8.8 MG/DL — SIGNIFICANT CHANGE UP (ref 8.4–10.5)
CHLORIDE SERPL-SCNC: 105 MMOL/L — SIGNIFICANT CHANGE UP (ref 96–108)
CO2 SERPL-SCNC: 23 MMOL/L — SIGNIFICANT CHANGE UP (ref 22–31)
CREAT SERPL-MCNC: 0.76 MG/DL — SIGNIFICANT CHANGE UP (ref 0.5–1.3)
ESTIMATED AVERAGE GLUCOSE: 146 MG/DL — HIGH (ref 68–114)
GLUCOSE BLDC GLUCOMTR-MCNC: 108 MG/DL — HIGH (ref 70–99)
GLUCOSE BLDC GLUCOMTR-MCNC: 113 MG/DL — HIGH (ref 70–99)
GLUCOSE BLDC GLUCOMTR-MCNC: 122 MG/DL — HIGH (ref 70–99)
GLUCOSE BLDC GLUCOMTR-MCNC: 255 MG/DL — HIGH (ref 70–99)
GLUCOSE SERPL-MCNC: 108 MG/DL — HIGH (ref 70–99)
HCT VFR BLD CALC: 40.8 % — SIGNIFICANT CHANGE UP (ref 39–50)
HGB BLD-MCNC: 13.9 G/DL — SIGNIFICANT CHANGE UP (ref 13–17)
MCHC RBC-ENTMCNC: 29.3 PG — SIGNIFICANT CHANGE UP (ref 27–34)
MCHC RBC-ENTMCNC: 34.1 GM/DL — SIGNIFICANT CHANGE UP (ref 32–36)
MCV RBC AUTO: 85.9 FL — SIGNIFICANT CHANGE UP (ref 80–100)
MRSA PCR RESULT.: SIGNIFICANT CHANGE UP
NRBC # BLD: 0 /100 WBCS — SIGNIFICANT CHANGE UP (ref 0–0)
PLATELET # BLD AUTO: 398 K/UL — SIGNIFICANT CHANGE UP (ref 150–400)
POTASSIUM SERPL-MCNC: 4.1 MMOL/L — SIGNIFICANT CHANGE UP (ref 3.5–5.3)
POTASSIUM SERPL-SCNC: 4.1 MMOL/L — SIGNIFICANT CHANGE UP (ref 3.5–5.3)
RBC # BLD: 4.75 M/UL — SIGNIFICANT CHANGE UP (ref 4.2–5.8)
RBC # FLD: 13.8 % — SIGNIFICANT CHANGE UP (ref 10.3–14.5)
S AUREUS DNA NOSE QL NAA+PROBE: SIGNIFICANT CHANGE UP
SARS-COV-2 IGG SERPL QL IA: POSITIVE
SARS-COV-2 IGM SERPL IA-ACNC: 65.7 INDEX — HIGH
SODIUM SERPL-SCNC: 139 MMOL/L — SIGNIFICANT CHANGE UP (ref 135–145)
VANCOMYCIN TROUGH SERPL-MCNC: 6.8 UG/ML — LOW (ref 10–20)
WBC # BLD: 12.92 K/UL — HIGH (ref 3.8–10.5)
WBC # FLD AUTO: 12.92 K/UL — HIGH (ref 3.8–10.5)

## 2021-02-08 PROCEDURE — 99232 SBSQ HOSP IP/OBS MODERATE 35: CPT

## 2021-02-08 RX ORDER — CEFEPIME 1 G/1
2000 INJECTION, POWDER, FOR SOLUTION INTRAMUSCULAR; INTRAVENOUS EVERY 8 HOURS
Refills: 0 | Status: DISCONTINUED | OUTPATIENT
Start: 2021-02-08 | End: 2021-02-11

## 2021-02-08 RX ORDER — VANCOMYCIN HCL 1 G
1250 VIAL (EA) INTRAVENOUS EVERY 12 HOURS
Refills: 0 | Status: DISCONTINUED | OUTPATIENT
Start: 2021-02-08 | End: 2021-02-08

## 2021-02-08 RX ORDER — INSULIN LISPRO 100/ML
VIAL (ML) SUBCUTANEOUS AT BEDTIME
Refills: 0 | Status: DISCONTINUED | OUTPATIENT
Start: 2021-02-08 | End: 2021-02-11

## 2021-02-08 RX ORDER — CEFEPIME 1 G/1
INJECTION, POWDER, FOR SOLUTION INTRAMUSCULAR; INTRAVENOUS
Refills: 0 | Status: DISCONTINUED | OUTPATIENT
Start: 2021-02-08 | End: 2021-02-11

## 2021-02-08 RX ORDER — CEFEPIME 1 G/1
2000 INJECTION, POWDER, FOR SOLUTION INTRAMUSCULAR; INTRAVENOUS ONCE
Refills: 0 | Status: COMPLETED | OUTPATIENT
Start: 2021-02-08 | End: 2021-02-08

## 2021-02-08 RX ADMIN — Medication 1: at 21:49

## 2021-02-08 RX ADMIN — SIMVASTATIN 20 MILLIGRAM(S): 20 TABLET, FILM COATED ORAL at 21:48

## 2021-02-08 RX ADMIN — Medication 15 MILLIGRAM(S): at 08:27

## 2021-02-08 RX ADMIN — PIPERACILLIN AND TAZOBACTAM 25 GRAM(S): 4; .5 INJECTION, POWDER, LYOPHILIZED, FOR SOLUTION INTRAVENOUS at 12:25

## 2021-02-08 RX ADMIN — OXYCODONE AND ACETAMINOPHEN 1 TABLET(S): 5; 325 TABLET ORAL at 13:52

## 2021-02-08 RX ADMIN — Medication 15 MILLIGRAM(S): at 02:18

## 2021-02-08 RX ADMIN — CEFEPIME 100 MILLIGRAM(S): 1 INJECTION, POWDER, FOR SOLUTION INTRAMUSCULAR; INTRAVENOUS at 21:48

## 2021-02-08 RX ADMIN — Medication 5 DROP(S): at 05:59

## 2021-02-08 RX ADMIN — Medication 15 MILLIGRAM(S): at 14:31

## 2021-02-08 RX ADMIN — Medication 15 MILLIGRAM(S): at 20:43

## 2021-02-08 RX ADMIN — Medication 166.67 MILLIGRAM(S): at 09:46

## 2021-02-08 RX ADMIN — LISINOPRIL 5 MILLIGRAM(S): 2.5 TABLET ORAL at 05:59

## 2021-02-08 RX ADMIN — PIPERACILLIN AND TAZOBACTAM 25 GRAM(S): 4; .5 INJECTION, POWDER, LYOPHILIZED, FOR SOLUTION INTRAVENOUS at 04:18

## 2021-02-08 RX ADMIN — CEFEPIME 100 MILLIGRAM(S): 1 INJECTION, POWDER, FOR SOLUTION INTRAMUSCULAR; INTRAVENOUS at 17:31

## 2021-02-08 RX ADMIN — Medication 5 DROP(S): at 17:32

## 2021-02-08 NOTE — PROGRESS NOTE ADULT - SUBJECTIVE AND OBJECTIVE BOX
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 Pt admitted and started on vanco and zosyn. Pt admits to improved ear pain with drops and pain meds. Denies n/v, vision changes or hearing loss    PAST MEDICAL & SURGICAL HISTORY:  No pertinent past medical history    No significant past surgical history      Allergies    No Known Allergies    Intolerances      MEDICATIONS  (STANDING):  dextrose 40% Gel 15 Gram(s) Oral once  dextrose 5%. 1000 milliLiter(s) (50 mL/Hr) IV Continuous <Continuous>  dextrose 5%. 1000 milliLiter(s) (100 mL/Hr) IV Continuous <Continuous>  dextrose 50% Injectable 25 Gram(s) IV Push once  dextrose 50% Injectable 12.5 Gram(s) IV Push once  dextrose 50% Injectable 25 Gram(s) IV Push once  glucagon  Injectable 1 milliGRAM(s) IntraMuscular once  insulin lispro (ADMELOG) corrective regimen sliding scale   SubCutaneous three times a day before meals  lisinopril 5 milliGRAM(s) Oral daily  ofloxacin 0.3% Solution 5 Drop(s) Left Ear two times a day  piperacillin/tazobactam IVPB.. 3.375 Gram(s) IV Intermittent every 8 hours  simvastatin 20 milliGRAM(s) Oral at bedtime  vancomycin  IVPB 1000 milliGRAM(s) IV Intermittent every 12 hours  vancomycin  IVPB        MEDICATIONS  (PRN):  ketorolac   Injectable 15 milliGRAM(s) IV Push every 6 hours PRN Moderate Pain (4 - 6)  oxycodone    5 mG/acetaminophen 325 mG 1 Tablet(s) Oral every 6 hours PRN Moderate Pain (4 - 6)      social history: see consult     family history: see consult     ROS:   ENT: all negative except as noted in HPI   Pulm: denies SOB, cough, hemoptysis  Neuro: denies numbness/tingling, loss of sensation  Endo: denies heat/cold intolerance, excessive sweating      Vital Signs Last 24 Hrs  T(C): 36.9 (08 Feb 2021 04:57), Max: 36.9 (08 Feb 2021 04:57)  T(F): 98.4 (08 Feb 2021 04:57), Max: 98.4 (08 Feb 2021 04:57)  HR: 64 (08 Feb 2021 04:57) (62 - 69)  BP: 143/82 (08 Feb 2021 04:57) (136/80 - 155/78)  BP(mean): --  RR: 16 (08 Feb 2021 04:57) (16 - 18)  SpO2: 97% (08 Feb 2021 04:57) (96% - 98%)                          13.9   12.92 )-----------( 398      ( 08 Feb 2021 06:56 )             40.8    02-08    139  |  105  |  11  ----------------------------<  108<H>  4.1   |  23  |  0.76    Ca    8.8      08 Feb 2021 07:01    TPro  7.0  /  Alb  3.6  /  TBili  0.3  /  DBili  x   /  AST  37  /  ALT  53<H>  /  AlkPhos  61  02-07         PHYSICAL EXAM:  Gen: NAD  Skin: No rashes, bruises, or lesions  Head: Normocephalic, Atraumatic  Face: no edema, erythema, or fluctuance. Parotid glands soft without mass  Eyes: no scleral injection  Ears: Right - ear canal clear, TM with extensive retraction and thickening, without effusion or erythema. No evidence of any fluid drainage. No mastoid tenderness, erythema, or ear bulging            Left - ear canal clear, TM with extensive retraction and thickening, without effusion or erythema. No mastoid tenderness, erythema, or ear bulging  Nose: Nares bilaterally patent, no discharge  Mouth: No Stridor / Drooling / Trismus.  Mucosa moist, tongue/uvula midline, oropharynx clear  Neck: Flat, supple, no lymphadenopathy, trachea midline, no masses  Lymphatic: No lymphadenopathy  Resp: breathing easily, no stridor  Neuro: facial nerve intact, no facial droop

## 2021-02-08 NOTE — PROGRESS NOTE ADULT - ASSESSMENT
55M hx of DMT2, HLD, HTN, Covid-19 (mild in Oct 2020) present to ED due to persistent left sided headache for 1 month. Initially treated for mastoiditis with 10 days of augmentin, now found to have infiltrative process on MRI concerning for skull base OM vs neoplasm.  Patient symptoms uunresponsive and progressive despite 10 days of po augmentin  ? Need fro debridement  also in view of OM will need long (6-8 week) course and obtaining tissue and microbiology will be immensely helpful in antimicrobial selection    Rec:  1) ENT evaluation noted-no intervention  2) Monitor clinically for any s/s intracrnail extenion or thrmobosis  3) Check blood Cx  4) MRSA PCR negative-will Dc vanco  5)change zosyn to cefepime-to set up OPAT and picc  6) Optimize DM control    Will tailor plan for ID issues  per course,results.Will defer to primary team on management of other issues.  Assessment, plan and recommendations as detailed above were discussed with the medical team   Will Follow.  Beeper [phone] [facility] [id number].   Wknd/afterhours/No response-[phone] or Fellow on call .

## 2021-02-08 NOTE — PROGRESS NOTE ADULT - ASSESSMENT
55 year old M w L sided headache radiating to neck and ear. . On exam pt noted to have B/L TM retractions and thickening. MRI revealed skull base osteo prompting admission. Pt now on IV vanco and zosyn, pain improving

## 2021-02-08 NOTE — PROGRESS NOTE ADULT - SUBJECTIVE AND OBJECTIVE BOX
Patient is a 55y old  Male who presents with a chief complaint of left sided periauricular pain and sever HA (08 Feb 2021 07:47)    Being followed by ID for mastoiditis, OM    Interval history:feels better  seen by ENT-no intervention  No acute events      ROS:  No cough,SOB,CP  No N/V/D./abd pain  No other complaints      Antimicrobials:    piperacillin/tazobactam IVPB.. 3.375 Gram(s) IV Intermittent every 8 hours  vancomycin  IVPB 1250 milliGRAM(s) IV Intermittent every 12 hours    Other medications reviewed    Vital Signs Last 24 Hrs  T(C): 36.4 (02-08-21 @ 13:37), Max: 36.9 (02-08-21 @ 04:57)  T(F): 97.5 (02-08-21 @ 13:37), Max: 98.4 (02-08-21 @ 04:57)  HR: 63 (02-08-21 @ 13:37) (63 - 69)  BP: 157/80 (02-08-21 @ 13:37) (141/70 - 157/80)  BP(mean): --  RR: 16 (02-08-21 @ 13:37) (16 - 17)  SpO2: 99% (02-08-21 @ 13:37) (96% - 99%)    Physical Exam:    HEENT PERRLA EOMI    left ear and preauricualr tenderness  No ear discharge     No oral exudate or erythema    Chest Good AE,CTA    CVS RRR S1 S2 WNl No murmur or rub or gallop    Abd soft BS normal No tenderness no masses    IV site no erythema tenderness or discharge    CNS AAO X 3 no focalLab Data:                          13.9   12.92 )-----------( 398      ( 08 Feb 2021 06:56 )             40.8       02-08    139  |  105  |  11  ----------------------------<  108<H>  4.1   |  23  |  0.76    Ca    8.8      08 Feb 2021 07:01    TPro  7.0  /  Alb  3.6  /  TBili  0.3  /  DBili  x   /  AST  37  /  ALT  53<H>  /  AlkPhos  61  02-07        Culture - Blood (collected 06 Feb 2021 21:08)  Source: .Blood Blood-Peripheral  Preliminary Report (07 Feb 2021 22:02):    No growth to date.    Culture - Blood (collected 06 Feb 2021 21:08)  Source: .Blood Blood-Peripheral  Preliminary Report (07 Feb 2021 22:02):    No growth to date.    Culture - Group A Streptococcus (collected 05 Feb 2021 10:03)  Source: .Throat  Final Report (06 Feb 2021 22:39):    No Streptococcus pyogenes (Group A) isolated    MRSA/MSSA PCR (02.07.21 @ 13:23)   MRSA PCR Result.: NotDetec: MRSA/MSSA PCR assay is a qualitative in vitro diagnostic test for the         Vancomycin Level, Trough: 6.8 ug/mL (02-08-21 @ 07:08)

## 2021-02-08 NOTE — PROGRESS NOTE ADULT - SUBJECTIVE AND OBJECTIVE BOX
Patient is a 55y old  Male who presents with a chief complaint of left sided periauricular pain and sever HA (08 Feb 2021 14:50)    pt. seen and examined     INTERVAL HPI/OVERNIGHT EVENTS:  T(C): 36.7 (02-09-21 @ 00:15), Max: 37.2 (02-08-21 @ 16:15)  HR: 65 (02-09-21 @ 00:15) (63 - 83)  BP: 151/69 (02-09-21 @ 00:15) (143/79 - 158/96)  RR: 16 (02-09-21 @ 00:15) (16 - 16)  SpO2: 97% (02-09-21 @ 00:15) (95% - 99%)  Wt(kg): --  I&O's Summary    07 Feb 2021 07:01  -  08 Feb 2021 07:00  --------------------------------------------------------  IN: 1457 mL / OUT: 500 mL / NET: 957 mL    08 Feb 2021 07:01  -  09 Feb 2021 01:24  --------------------------------------------------------  IN: 237 mL / OUT: 900 mL / NET: -663 mL        PAST MEDICAL & SURGICAL HISTORY:  No pertinent past medical history    No significant past surgical history        SOCIAL HISTORY  Alcohol:  Tobacco:  Illicit substance use:    FAMILY HISTORY:    REVIEW OF SYSTEMS:  CONSTITUTIONAL: No fever, weight loss, or fatigue  EYES: No eye pain, visual disturbances, or discharge  ENMT:  No difficulty hearing, tinnitus, vertigo; No sinus or throat pain  NECK: No pain or stiffness  RESPIRATORY: No cough, wheezing, chills or hemoptysis; No shortness of breath  CARDIOVASCULAR: No chest pain, palpitations, dizziness, or leg swelling  GASTROINTESTINAL: No abdominal or epigastric pain. No nausea, vomiting, or hematemesis; No diarrhea or constipation. No melena or hematochezia.  GENITOURINARY: No dysuria, frequency, hematuria, or incontinence  NEUROLOGICAL: No headaches, memory loss, loss of strength, numbness, or tremors  SKIN: No itching, burning, rashes, or lesions   LYMPH NODES: No enlarged glands  ENDOCRINE: No heat or cold intolerance; No hair loss  MUSCULOSKELETAL: No joint pain or swelling; No muscle, back, or extremity pain  PSYCHIATRIC: No depression, anxiety, mood swings, or difficulty sleeping  HEME/LYMPH: No easy bruising, or bleeding gums  ALLERY AND IMMUNOLOGIC: No hives or eczema    RADIOLOGY & ADDITIONAL TESTS:    Imaging Personally Reviewed:  [ ] YES  [ ] NO    Consultant(s) Notes Reviewed:  [ ] YES  [ ] NO    PHYSICAL EXAM:  GENERAL: NAD, well-groomed, well-developed  HEAD:  Atraumatic, Normocephalic  EYES: EOMI, PERRLA, conjunctiva and sclera clear  ENMT: No tonsillar erythema, exudates, or enlargement; Moist mucous membranes, Good dentition, No lesions  NECK: Supple, No JVD, Normal thyroid  NERVOUS SYSTEM:  Alert & Oriented X3, Good concentration; Motor Strength 5/5 B/L upper and lower extremities; DTRs 2+ intact and symmetric  CHEST/LUNG: Clear to percussion bilaterally; No rales, rhonchi, wheezing, or rubs  HEART: Regular rate and rhythm; No murmurs, rubs, or gallops  ABDOMEN: Soft, Nontender, Nondistended; Bowel sounds present  EXTREMITIES:  2+ Peripheral Pulses, No clubbing, cyanosis, or edema  LYMPH: No lymphadenopathy noted  SKIN: No rashes or lesions    LABS:                        13.9   12.92 )-----------( 398      ( 08 Feb 2021 06:56 )             40.8     02-08    139  |  105  |  11  ----------------------------<  108<H>  4.1   |  23  |  0.76    Ca    8.8      08 Feb 2021 07:01    TPro  7.0  /  Alb  3.6  /  TBili  0.3  /  DBili  x   /  AST  37  /  ALT  53<H>  /  AlkPhos  61  02-07        CAPILLARY BLOOD GLUCOSE      POCT Blood Glucose.: 255 mg/dL (08 Feb 2021 21:24)  POCT Blood Glucose.: 122 mg/dL (08 Feb 2021 17:21)  POCT Blood Glucose.: 108 mg/dL (08 Feb 2021 12:15)  POCT Blood Glucose.: 113 mg/dL (08 Feb 2021 09:18)            MEDICATIONS  (STANDING):  cefepime   IVPB      cefepime   IVPB 2000 milliGRAM(s) IV Intermittent every 8 hours  dextrose 40% Gel 15 Gram(s) Oral once  dextrose 5%. 1000 milliLiter(s) (50 mL/Hr) IV Continuous <Continuous>  dextrose 5%. 1000 milliLiter(s) (100 mL/Hr) IV Continuous <Continuous>  dextrose 50% Injectable 25 Gram(s) IV Push once  dextrose 50% Injectable 12.5 Gram(s) IV Push once  dextrose 50% Injectable 25 Gram(s) IV Push once  glucagon  Injectable 1 milliGRAM(s) IntraMuscular once  insulin lispro (ADMELOG) corrective regimen sliding scale   SubCutaneous three times a day before meals  insulin lispro (ADMELOG) corrective regimen sliding scale   SubCutaneous at bedtime  lisinopril 5 milliGRAM(s) Oral daily  ofloxacin 0.3% Solution 5 Drop(s) Left Ear two times a day  simvastatin 20 milliGRAM(s) Oral at bedtime    MEDICATIONS  (PRN):  ketorolac   Injectable 15 milliGRAM(s) IV Push every 6 hours PRN Moderate Pain (4 - 6)  oxycodone    5 mG/acetaminophen 325 mG 1 Tablet(s) Oral every 6 hours PRN Moderate Pain (4 - 6)      Care Discussed with Consultants/Other Providers [ ] YES  [ ] NO

## 2021-02-09 ENCOUNTER — TRANSCRIPTION ENCOUNTER (OUTPATIENT)
Age: 56
End: 2021-02-09

## 2021-02-09 LAB
ANION GAP SERPL CALC-SCNC: 11 MMOL/L — SIGNIFICANT CHANGE UP (ref 5–17)
BUN SERPL-MCNC: 12 MG/DL — SIGNIFICANT CHANGE UP (ref 7–23)
CALCIUM SERPL-MCNC: 9.1 MG/DL — SIGNIFICANT CHANGE UP (ref 8.4–10.5)
CHLORIDE SERPL-SCNC: 108 MMOL/L — SIGNIFICANT CHANGE UP (ref 96–108)
CO2 SERPL-SCNC: 21 MMOL/L — LOW (ref 22–31)
CREAT SERPL-MCNC: 0.6 MG/DL — SIGNIFICANT CHANGE UP (ref 0.5–1.3)
CRP SERPL-MCNC: 0.78 MG/DL — HIGH (ref 0–0.4)
ERYTHROCYTE [SEDIMENTATION RATE] IN BLOOD: 49 MM/HR — HIGH (ref 0–20)
GLUCOSE BLDC GLUCOMTR-MCNC: 110 MG/DL — HIGH (ref 70–99)
GLUCOSE BLDC GLUCOMTR-MCNC: 114 MG/DL — HIGH (ref 70–99)
GLUCOSE BLDC GLUCOMTR-MCNC: 144 MG/DL — HIGH (ref 70–99)
GLUCOSE BLDC GLUCOMTR-MCNC: 180 MG/DL — HIGH (ref 70–99)
GLUCOSE SERPL-MCNC: 104 MG/DL — HIGH (ref 70–99)
HCT VFR BLD CALC: 40.1 % — SIGNIFICANT CHANGE UP (ref 39–50)
HGB BLD-MCNC: 13.1 G/DL — SIGNIFICANT CHANGE UP (ref 13–17)
Lab: 0 — SIGNIFICANT CHANGE UP
Lab: <0.1 KUA/L — SIGNIFICANT CHANGE UP
MCHC RBC-ENTMCNC: 28.4 PG — SIGNIFICANT CHANGE UP (ref 27–34)
MCHC RBC-ENTMCNC: 32.7 GM/DL — SIGNIFICANT CHANGE UP (ref 32–36)
MCV RBC AUTO: 86.8 FL — SIGNIFICANT CHANGE UP (ref 80–100)
NRBC # BLD: 0 /100 WBCS — SIGNIFICANT CHANGE UP (ref 0–0)
PLATELET # BLD AUTO: 403 K/UL — HIGH (ref 150–400)
POTASSIUM SERPL-MCNC: 4.5 MMOL/L — SIGNIFICANT CHANGE UP (ref 3.5–5.3)
POTASSIUM SERPL-SCNC: 4.5 MMOL/L — SIGNIFICANT CHANGE UP (ref 3.5–5.3)
RBC # BLD: 4.62 M/UL — SIGNIFICANT CHANGE UP (ref 4.2–5.8)
RBC # FLD: 14 % — SIGNIFICANT CHANGE UP (ref 10.3–14.5)
SODIUM SERPL-SCNC: 140 MMOL/L — SIGNIFICANT CHANGE UP (ref 135–145)
TOTAL IGE SMQN RAST: SIGNIFICANT CHANGE UP
WBC # BLD: 13.09 K/UL — HIGH (ref 3.8–10.5)
WBC # FLD AUTO: 13.09 K/UL — HIGH (ref 3.8–10.5)

## 2021-02-09 PROCEDURE — 99232 SBSQ HOSP IP/OBS MODERATE 35: CPT

## 2021-02-09 PROCEDURE — 71045 X-RAY EXAM CHEST 1 VIEW: CPT | Mod: 26

## 2021-02-09 RX ADMIN — Medication 15 MILLIGRAM(S): at 08:43

## 2021-02-09 RX ADMIN — Medication 15 MILLIGRAM(S): at 20:44

## 2021-02-09 RX ADMIN — Medication 5 DROP(S): at 17:19

## 2021-02-09 RX ADMIN — CEFEPIME 100 MILLIGRAM(S): 1 INJECTION, POWDER, FOR SOLUTION INTRAMUSCULAR; INTRAVENOUS at 05:52

## 2021-02-09 RX ADMIN — SIMVASTATIN 20 MILLIGRAM(S): 20 TABLET, FILM COATED ORAL at 21:18

## 2021-02-09 RX ADMIN — CEFEPIME 100 MILLIGRAM(S): 1 INJECTION, POWDER, FOR SOLUTION INTRAMUSCULAR; INTRAVENOUS at 13:10

## 2021-02-09 RX ADMIN — Medication 5 DROP(S): at 05:51

## 2021-02-09 RX ADMIN — CEFEPIME 100 MILLIGRAM(S): 1 INJECTION, POWDER, FOR SOLUTION INTRAMUSCULAR; INTRAVENOUS at 21:18

## 2021-02-09 RX ADMIN — Medication 1: at 13:10

## 2021-02-09 RX ADMIN — Medication 15 MILLIGRAM(S): at 14:39

## 2021-02-09 RX ADMIN — OXYCODONE AND ACETAMINOPHEN 1 TABLET(S): 5; 325 TABLET ORAL at 19:04

## 2021-02-09 RX ADMIN — Medication 15 MILLIGRAM(S): at 02:38

## 2021-02-09 RX ADMIN — OXYCODONE AND ACETAMINOPHEN 1 TABLET(S): 5; 325 TABLET ORAL at 00:26

## 2021-02-09 RX ADMIN — LISINOPRIL 5 MILLIGRAM(S): 2.5 TABLET ORAL at 05:51

## 2021-02-09 NOTE — PROGRESS NOTE ADULT - SUBJECTIVE AND OBJECTIVE BOX
Patient is a 55y old  Male who presents with a chief complaint of left sided periauricular pain and sever HA (09 Feb 2021 17:29)    pt. seen and examined, feeling better     INTERVAL HPI/OVERNIGHT EVENTS:  T(C): 36.6 (02-10-21 @ 00:11), Max: 36.8 (02-09-21 @ 16:01)  HR: 61 (02-10-21 @ 00:11) (60 - 67)  BP: 155/81 (02-10-21 @ 00:11) (127/72 - 158/81)  RR: 17 (02-10-21 @ 00:11) (17 - 18)  SpO2: 98% (02-10-21 @ 00:11) (95% - 98%)  Wt(kg): --  I&O's Summary    08 Feb 2021 07:01  -  09 Feb 2021 07:00  --------------------------------------------------------  IN: 827 mL / OUT: 900 mL / NET: -73 mL    09 Feb 2021 07:01  -  10 Feb 2021 01:43  --------------------------------------------------------  IN: 530 mL / OUT: 0 mL / NET: 530 mL        PAST MEDICAL & SURGICAL HISTORY:  No pertinent past medical history    No significant past surgical history        SOCIAL HISTORY  Alcohol:  Tobacco:  Illicit substance use:    FAMILY HISTORY:    REVIEW OF SYSTEMS:  CONSTITUTIONAL: No fever, weight loss, or fatigue  EYES: No eye pain, visual disturbances, or discharge  ENMT:  No difficulty hearing, tinnitus, vertigo; No sinus or throat pain  NECK: No pain or stiffness  RESPIRATORY: No cough, wheezing, chills or hemoptysis; No shortness of breath  CARDIOVASCULAR: No chest pain, palpitations, dizziness, or leg swelling  GASTROINTESTINAL: No abdominal or epigastric pain. No nausea, vomiting, or hematemesis; No diarrhea or constipation. No melena or hematochezia.  GENITOURINARY: No dysuria, frequency, hematuria, or incontinence  NEUROLOGICAL: No headaches, memory loss, loss of strength, numbness, or tremors  SKIN: No itching, burning, rashes, or lesions   LYMPH NODES: No enlarged glands  ENDOCRINE: No heat or cold intolerance; No hair loss  MUSCULOSKELETAL: No joint pain or swelling; No muscle, back, or extremity pain  PSYCHIATRIC: No depression, anxiety, mood swings, or difficulty sleeping  HEME/LYMPH: No easy bruising, or bleeding gums  ALLERY AND IMMUNOLOGIC: No hives or eczema    RADIOLOGY & ADDITIONAL TESTS:    Imaging Personally Reviewed:  [ ] YES  [ ] NO    Consultant(s) Notes Reviewed:  [ ] YES  [ ] NO    PHYSICAL EXAM:  GENERAL: NAD, well-groomed, well-developed  HEAD:  Atraumatic, Normocephalic  EYES: EOMI, PERRLA, conjunctiva and sclera clear  ENMT: No tonsillar erythema, exudates, or enlargement; Moist mucous membranes, Good dentition, No lesions  NECK: Supple, No JVD, Normal thyroid  NERVOUS SYSTEM:  Alert & Oriented X3, Good concentration; Motor Strength 5/5 B/L upper and lower extremities; DTRs 2+ intact and symmetric  CHEST/LUNG: Clear to percussion bilaterally; No rales, rhonchi, wheezing, or rubs  HEART: Regular rate and rhythm; No murmurs, rubs, or gallops  ABDOMEN: Soft, Nontender, Nondistended; Bowel sounds present  EXTREMITIES:  2+ Peripheral Pulses, No clubbing, cyanosis, or edema  LYMPH: No lymphadenopathy noted  SKIN: No rashes or lesions    LABS:                        13.1   13.09 )-----------( 403      ( 09 Feb 2021 06:30 )             40.1     02-09    140  |  108  |  12  ----------------------------<  104<H>  4.5   |  21<L>  |  0.60    Ca    9.1      09 Feb 2021 06:27          CAPILLARY BLOOD GLUCOSE      POCT Blood Glucose.: 144 mg/dL (09 Feb 2021 21:20)  POCT Blood Glucose.: 114 mg/dL (09 Feb 2021 17:16)  POCT Blood Glucose.: 180 mg/dL (09 Feb 2021 13:03)  POCT Blood Glucose.: 110 mg/dL (09 Feb 2021 08:33)            MEDICATIONS  (STANDING):  cefepime   IVPB      cefepime   IVPB 2000 milliGRAM(s) IV Intermittent every 8 hours  dextrose 40% Gel 15 Gram(s) Oral once  dextrose 5%. 1000 milliLiter(s) (50 mL/Hr) IV Continuous <Continuous>  dextrose 5%. 1000 milliLiter(s) (100 mL/Hr) IV Continuous <Continuous>  dextrose 50% Injectable 25 Gram(s) IV Push once  dextrose 50% Injectable 12.5 Gram(s) IV Push once  dextrose 50% Injectable 25 Gram(s) IV Push once  glucagon  Injectable 1 milliGRAM(s) IntraMuscular once  insulin lispro (ADMELOG) corrective regimen sliding scale   SubCutaneous three times a day before meals  insulin lispro (ADMELOG) corrective regimen sliding scale   SubCutaneous at bedtime  lisinopril 5 milliGRAM(s) Oral daily  ofloxacin 0.3% Solution 5 Drop(s) Left Ear two times a day  simvastatin 20 milliGRAM(s) Oral at bedtime    MEDICATIONS  (PRN):  ketorolac   Injectable 15 milliGRAM(s) IV Push every 6 hours PRN Moderate Pain (4 - 6)  oxycodone    5 mG/acetaminophen 325 mG 1 Tablet(s) Oral every 6 hours PRN Moderate Pain (4 - 6)      Care Discussed with Consultants/Other Providers [ ] YES  [ ] NO

## 2021-02-09 NOTE — PROGRESS NOTE ADULT - SUBJECTIVE AND OBJECTIVE BOX
Patient is a 55y old  Male who presents with a chief complaint of left sided periauricular pain and severe HA (09 Feb 2021 07:46)    Being followed by ID for mastoiditis,OM    Interval history:some pain  but feels better than before   No acute events      ROS:  No cough,SOB,CP  No N/V/D./abd pain  No other complaints      Antimicrobials:    cefepime   IVPB      cefepime   IVPB 2000 milliGRAM(s) IV Intermittent every 8 hours    Other medications reviewed    Vital Signs Last 24 Hrs  T(C): 36.7 (02-09-21 @ 13:25), Max: 37.2 (02-08-21 @ 16:15)  T(F): 98 (02-09-21 @ 13:25), Max: 98.9 (02-08-21 @ 16:15)  HR: 60 (02-09-21 @ 13:25) (60 - 83)  BP: 135/77 (02-09-21 @ 13:25) (127/72 - 158/96)  BP(mean): --  RR: 18 (02-09-21 @ 13:25) (16 - 18)  SpO2: 95% (02-09-21 @ 13:25) (95% - 98%)    Physical Exam:    HEENT PERRLA EOMI    left ear and preauricualr tenderness  No ear discharge     No oral exudate or erythema    Chest Good AE,CTA    CVS RRR S1 S2 WNl No murmur or rub or gallop    Abd soft BS normal No tenderness no masses    IV site no erythema tenderness or discharge    CNS AAO X 3 no focal  Lab Data:                          13.1   13.09 )-----------( 403      ( 09 Feb 2021 06:30 )             40.1     WBC Count: 13.09 (02-09-21 @ 06:30)  WBC Count: 12.92 (02-08-21 @ 06:56)  WBC Count: 11.13 (02-07-21 @ 06:27)  WBC Count: 13.74 (02-04-21 @ 21:28)    02-09    140  |  108  |  12  ----------------------------<  104<H>  4.5   |  21<L>  |  0.60    Ca    9.1      09 Feb 2021 06:27          Culture - Blood (collected 06 Feb 2021 21:08)  Source: .Blood Blood-Peripheral  Preliminary Report (07 Feb 2021 22:02):    No growth to date.    Culture - Blood (collected 06 Feb 2021 21:08)  Source: .Blood Blood-Peripheral  Preliminary Report (07 Feb 2021 22:02):    No growth to date.    Culture - Group A Streptococcus (collected 05 Feb 2021 10:03)  Source: .Throat  Final Report (06 Feb 2021 22:39):    No Streptococcus pyogenes (Group A) isolated            Vancomycin Level, Trough: 6.8 ug/mL (02-08-21 @ 07:08)        < from: Xray Chest 1 View- PORTABLE-Urgent (Xray Chest 1 View- PORTABLE-Urgent .) (02.09.21 @ 12:25) >  Impression:    The heart is normal in size. The lungs are clear. No pleural effusion. No pneumothorax. A PICC line is seen on the right and the tip is in superior vena cava.        < end of copied text >

## 2021-02-09 NOTE — PROGRESS NOTE ADULT - ASSESSMENT
55M hx of DMT2, HLD, HTN, Covid-19 (mild in Oct 2020) present to ED due to persistent left sided headache for 1 month. Initially treated for mastoiditis with 10 days of augmentin, now found to have infiltrative process on MRI concerning for skull base OM vs neoplasm.  Patient symptoms unresponsive and progressive despite 10 days of po augmentin  ? Need fro debridement  also in view of OM will need long (6-8 week) course and obtaining tissue and microbiology will be immensely helpful in antimicrobial selection  MRSA PCR negative     Rec:  1) ENT evaluation noted-no intervention  2) continue cefepime  PICC side effects,abx side effects discussed.Risks/benefits and alternatives explained.  CBC,CMP weekly,fax results to [phone].Call [phone] with critical results.  PICC care per home care.  Dc planning in progress   To follow in ID clinic in 3-4 weeks ,asked to call and make appointment.  3) Monitor WBC  4) Optimize DM control  5) s/s worsening explained to patient-if occur asked to come back to hospital or contact us     Will tailor plan for ID issues  per course,results.Will defer to primary team on management of other issues.  Assessment, plan and recommendations as detailed above were discussed with the medical team   I am away tomorrow.My colleagues in ID service will cover .Please call [phone] if any issues or questions.

## 2021-02-09 NOTE — DISCHARGE NOTE PROVIDER - NSDCFUADDINST_GEN_ALL_CORE_FT
Please complete blood work (CBC,CMP) weekly and fax results to [phone].Call [phone] with any critical results.    If any worsening signs or symptoms please come back to hospital or contact us immediately.

## 2021-02-09 NOTE — DISCHARGE NOTE PROVIDER - HOSPITAL COURSE
55M hx of DMT2, HLD, HTN, Covid-19 (mild in Oct 2020) present to ED due to persistent left sided headache for 1 month. Initially treated for mastoiditis with 10 days of augmentin. Patient symptoms unresponsive and progressive despite 10 days of po augmentin. now found to have infiltrative process on MRI concerning for skull base OM. ENT consulted, no intervention necessary. In view of OM will need long (6-8 week) course of Cefepime. PICC line placed and CXR confirms placement.    Medications and plans discussed with Dr. Quintana. Patient is cleared for discharge. 55M hx of DMT2, HLD, HTN, Covid-19 (mild in Oct 2020) present to ED due to persistent left sided headache for 1 month. Initially treated for mastoiditis with 10 days of augmentin. Patient symptoms unresponsive and progressive despite 10 days of po augmentin. now found to have infiltrative process on MRI concerning for skull base OM. ENT consulted, no intervention necessary. In view of OM will need long (6-8 week) course of Cefepime. 2 gm IVPB q 8 hours for 8 weeks.  PICC line placed and CXR confirms placement.  Patient will follow up with the ID clinic in 2 weeks.      Medications and plans discussed with Dr. Quintana. Patient is cleared for discharge.

## 2021-02-09 NOTE — DISCHARGE NOTE PROVIDER - NSDCFUADDAPPT_GEN_ALL_CORE_FT
Please follow up with your primary care provider within 1 week of discharge.  Please schedule weekly appointments with your medical provider for routine blood work (CBC and CMP) and have the results faxed to Dr. Cobb @ [phone].    Please call the infectious disease clinic @ [phone] to schedule an appointment with Dr. Cobb in 3 or 4 weeks.

## 2021-02-09 NOTE — DISCHARGE NOTE PROVIDER - CARE PROVIDER_API CALL
Carlos Cobb)  Infectious Disease; Internal Medicine  [address] [place] [address]  Phone: [phone]  Fax: [phone]  Follow Up Time:    Carlos Cobb)  Infectious Disease; Internal Medicine  [address] [place] [address]  Phone: [phone]  Fax: [phone]  Follow Up Time: 2 weeks

## 2021-02-09 NOTE — DISCHARGE NOTE PROVIDER - NSDCCAREPROVSEEN_GEN_ALL_CORE_FT
Heriberto Quintana Mariano, Heriberto Riggins, Rolo Cobb, Carlos Landrum, Edinson Martinez, Richie MCMILLAN

## 2021-02-09 NOTE — DISCHARGE NOTE PROVIDER - NSDCFUSCHEDAPPT_GEN_ALL_CORE_FT
RAY FISCHER ; 03/03/2021 ; NPP Otol[facility] [address] [facility] RAY FISCHER ; 03/03/2021 ; NPP Otolaryng [address] [facility]  RAY FISCHER ; 03/04/2021 ; NPP Med  Comm

## 2021-02-09 NOTE — DISCHARGE NOTE PROVIDER - NSDCMRMEDTOKEN_GEN_ALL_CORE_FT
Augmentin 875 mg-125 mg oral tablet: 1 tab(s) orally every 12 hours MDD:2 tabs   CBC and CMP weekly :   cefepime 2 g/100 mL intravenous solution: 2 gram(s) intravenous every 8 hours   End  date: 3/29/21  ICD 10 code: 86.9  lisinopril 5 mg oral tablet: 1 tab(s) orally once a day  normal saline flush : 10 milliliter(s) intracavernously every 8 hours   ofloxacin 0.3% ophthalmic solution: 5 drop(s) in each ear 2 times a day  oxycodone-acetaminophen 5 mg-325 mg oral tablet: 1 tab(s) orally every 6 hours, As needed, Moderate Pain (4 - 6)  simvastatin 20 mg oral tablet: 1 tab(s) orally once a day (at bedtime)   CBC and CMP weekly :   cefepime 2 g/100 mL intravenous solution: 2 gram(s) intravenous every 8 hours   End  date: 3/29/21  ICD 10 code: 86.9  ketorolac 10 mg oral tablet: 1 tab(s) orally 4 times a day  lisinopril 5 mg oral tablet: 1 tab(s) orally once a day  normal saline flush : 10 milliliter(s) intracavernously every 8 hours   ofloxacin 0.3% otic solution: 5 drop(s) to each affected ear 2 times a day  simvastatin 20 mg oral tablet: 1 tab(s) orally once a day (at bedtime)

## 2021-02-09 NOTE — PROGRESS NOTE ADULT - ASSESSMENT
55 year old M w L sided headache radiating to neck and ear. . On exam pt noted to have B/L TM retractions and thickening. MRI revealed skull base osteo prompting admission. Pt now on IV vanco and zosyn, pain improving  55 year old M w L sided headache radiating to neck and ear. On exam pt noted to have B/L TM retractions and thickening. 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 Pt now on IV vanco and zosyn, pain improving

## 2021-02-09 NOTE — DISCHARGE NOTE PROVIDER - NSFOLLOWUPCLINICS_GEN_ALL_ED_FT
[facility] - Infectious Disease  Infectious Disease  [address] [facility] [address], [place] [address]  Phone: [phone]  Fax:   Follow Up Time:      [facility] - Infectious Disease  Infectious Disease  [address] [facility], [place] [address]  Phone: [phone]  Fax:   Follow Up Time: 2 weeks

## 2021-02-09 NOTE — DISCHARGE NOTE PROVIDER - NSDCCPCAREPLAN_GEN_ALL_CORE_FT
PRINCIPAL DISCHARGE DIAGNOSIS  Diagnosis: Osteomyelitis of skull  Assessment and Plan of Treatment: Continue taking your antibiotics as prescribed. Follow up with your medical provider for weekly bleed tests including CBC & CMP.      SECONDARY DISCHARGE DIAGNOSES  Diagnosis: Acute nonintractable headache, unspecified headache type  Assessment and Plan of Treatment: Headache  A headache is pain or discomfort felt around the head or neck area. The specific cause of a headache may not be found as there are many types including tension headaches, migraine headaches, and cluster headaches. Watch your condition for any changes. Things you can do to manage your pain include taking over the counter and prescription medications as instructed by your health care provider, lying down in a dark quiet room, limiting stress, getting regular sleep, and refraining from alcohol and tobacco products.  SEEK IMMEDIATE MEDICAL CARE IF YOU HAVE ANY OF THE FOLLOWING SYMPTOMS: fever, vomiting, stiff neck, loss of vision, problems with speech, muscle weakness, loss of balance, trouble walking, passing out, or confusion.     PRINCIPAL DISCHARGE DIAGNOSIS  Diagnosis: Osteomyelitis of skull  Assessment and Plan of Treatment: You will continue Cefepime 2 mg IVSS every 8 hours for 8 weeks.  Follow up at the ID clinic in 3 weeks call to schedule an appointment   Please schedule weekly appointments with your medical provider for routine blood work (CBC and CMP) and have the results faxed to Dr. Cobb @ [phone].        SECONDARY DISCHARGE DIAGNOSES  Diagnosis: Mastoiditis of left side  Assessment and Plan of Treatment:     Diagnosis: Left ear pain  Assessment and Plan of Treatment:     Diagnosis: Type 2 diabetes mellitus  Assessment and Plan of Treatment: HgA1C this admission.  Make sure you get your HgA1c checked every three months.  If you take oral diabetes medications, check your blood glucose two times a day.  If you take insulin, check your blood glucose before meals and at bedtime.  It's important not to skip any meals.  Keep a log of your blood glucose results and always take it with you to your doctor appointments.  Keep a list of your current medications including injectables and over the counter medications and bring this medication list with you to all your doctor appointments.  If you have not seen your opthalmologist this year call for appointment.  Check your feet daily for redness, sores, or openings. Do not self treat. If no improvement in two days call your primary care physician for an appointment.  Low blood sugar (hypoglycemia) is a blood sugar below 70mg/dl. Check your blood sugar if you feel signs/symptoms of hypoglycemia. If your blood sugar is below 70 take 15 grams of carbohydrates (ex 4 oz of apple juice, 3-4 glucosr tablets, or 4-6 oz of regular soda) wait 15 minutes and repeat blood sugar to make sure it comes up above 70.  If your blood sugar is above 70 and you are due for a meal, have a meal.  If you are not due for a meal have a snack.  This snack helps keeps your blood sugar at a safe range.      Diagnosis: Acute nonintractable headache, unspecified headache type  Assessment and Plan of Treatment: Headache  A headache is pain or discomfort felt around the head or neck area. The specific cause of a headache may not be found as there are many types including tension headaches, migraine headaches, and cluster headaches. Watch your condition for any changes. Things you can do to manage your pain include taking over the counter and prescription medications as instructed by your health care provider, lying down in a dark quiet room, limiting stress, getting regular sleep, and refraining from alcohol and tobacco products.  SEEK IMMEDIATE MEDICAL CARE IF YOU HAVE ANY OF THE FOLLOWING SYMPTOMS: fever, vomiting, stiff neck, loss of vision, problems with speech, muscle weakness, loss of balance, trouble walking, passing out, or confusion.     PRINCIPAL DISCHARGE DIAGNOSIS  Diagnosis: Osteomyelitis of skull  Assessment and Plan of Treatment: You will continue Cefepime 2 mg IVSS every 8 hours for 8 weeks.  Follow up at the ID clinic in 3 weeks call to schedule an appointment   Please schedule weekly appointments with your medical provider for routine blood work (CBC and CMP) and have the results faxed to Dr. Cobb @ [phone].        SECONDARY DISCHARGE DIAGNOSES  Diagnosis: Mastoiditis of left side  Assessment and Plan of Treatment: Continue   IV antibiotic as ordered   pain medication as needed    Diagnosis: Left ear pain  Assessment and Plan of Treatment: continue  Ofloxacin otic solution 5 drops to  Left  ear twice a day stop when pain resolved  pain medication as needed    Diagnosis: Type 2 diabetes mellitus  Assessment and Plan of Treatment: HgA1C this admission.  Make sure you get your HgA1c checked every three months.  If you take oral diabetes medications, check your blood glucose two times a day.  If you take insulin, check your blood glucose before meals and at bedtime.  It's important not to skip any meals.  Keep a log of your blood glucose results and always take it with you to your doctor appointments.  Keep a list of your current medications including injectables and over the counter medications and bring this medication list with you to all your doctor appointments.  If you have not seen your opthalmologist this year call for appointment.  Check your feet daily for redness, sores, or openings. Do not self treat. If no improvement in two days call your primary care physician for an appointment.  Low blood sugar (hypoglycemia) is a blood sugar below 70mg/dl. Check your blood sugar if you feel signs/symptoms of hypoglycemia. If your blood sugar is below 70 take 15 grams of carbohydrates (ex 4 oz of apple juice, 3-4 glucosr tablets, or 4-6 oz of regular soda) wait 15 minutes and repeat blood sugar to make sure it comes up above 70.  If your blood sugar is above 70 and you are due for a meal, have a meal.  If you are not due for a meal have a snack.  This snack helps keeps your blood sugar at a safe range.      Diagnosis: Acute nonintractable headache, unspecified headache type  Assessment and Plan of Treatment: Headache  A headache is pain or discomfort felt around the head or neck area. The specific cause of a headache may not be found as there are many types including tension headaches, migraine headaches, and cluster headaches. Watch your condition for any changes. Things you can do to manage your pain include taking over the counter and prescription medications as instructed by your health care provider, lying down in a dark quiet room, limiting stress, getting regular sleep, and refraining from alcohol and tobacco products.  SEEK IMMEDIATE MEDICAL CARE IF YOU HAVE ANY OF THE FOLLOWING SYMPTOMS: fever, vomiting, stiff neck, loss of vision, problems with speech, muscle weakness, loss of balance, trouble walking, passing out, or confusion.     PRINCIPAL DISCHARGE DIAGNOSIS  Diagnosis: Osteomyelitis of skull  Assessment and Plan of Treatment: You will continue Cefepime 2 mg IVSS every 8 hours for 8 weeks.  Follow up at the ID clinic in 3 weeks call to schedule an appointment   Please schedule weekly appointments with your medical provider for routine blood work (CBC and CMP) and have the results faxed to Dr. Cobb @ [phone].        SECONDARY DISCHARGE DIAGNOSES  Diagnosis: Benign essential HTN  Assessment and Plan of Treatment: Follow up with your medical doctor to establish long term blood pressure treatment goals.  Monitor BP   Continuie Linisopril 5 mg po daily       Diagnosis: Mastoiditis of left side  Assessment and Plan of Treatment: Continue   IV antibiotic as ordered   pain medication as needed    Diagnosis: Left ear pain  Assessment and Plan of Treatment: continue  Ofloxacin otic solution 5 drops to  Left  ear twice a day stop when pain resolved  pain medication as needed    Diagnosis: Type 2 diabetes mellitus  Assessment and Plan of Treatment: HgA1C this admission.  Make sure you get your HgA1c checked every three months.  If you take oral diabetes medications, check your blood glucose two times a day.  If you take insulin, check your blood glucose before meals and at bedtime.  It's important not to skip any meals.  Keep a log of your blood glucose results and always take it with you to your doctor appointments.  Keep a list of your current medications including injectables and over the counter medications and bring this medication list with you to all your doctor appointments.  If you have not seen your opthalmologist this year call for appointment.  Check your feet daily for redness, sores, or openings. Do not self treat. If no improvement in two days call your primary care physician for an appointment.  Low blood sugar (hypoglycemia) is a blood sugar below 70mg/dl. Check your blood sugar if you feel signs/symptoms of hypoglycemia. If your blood sugar is below 70 take 15 grams of carbohydrates (ex 4 oz of apple juice, 3-4 glucosr tablets, or 4-6 oz of regular soda) wait 15 minutes and repeat blood sugar to make sure it comes up above 70.  If your blood sugar is above 70 and you are due for a meal, have a meal.  If you are not due for a meal have a snack.  This snack helps keeps your blood sugar at a safe range.      Diagnosis: Acute nonintractable headache, unspecified headache type  Assessment and Plan of Treatment: Headache  A headache is pain or discomfort felt around the head or neck area. The specific cause of a headache may not be found as there are many types including tension headaches, migraine headaches, and cluster headaches. Watch your condition for any changes. Things you can do to manage your pain include taking over the counter and prescription medications as instructed by your health care provider, lying down in a dark quiet room, limiting stress, getting regular sleep, and refraining from alcohol and tobacco products.  SEEK IMMEDIATE MEDICAL CARE IF YOU HAVE ANY OF THE FOLLOWING SYMPTOMS: fever, vomiting, stiff neck, loss of vision, problems with speech, muscle weakness, loss of balance, trouble walking, passing out, or confusion.

## 2021-02-09 NOTE — PROGRESS NOTE ADULT - SUBJECTIVE AND OBJECTIVE BOX
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 Pt admitted and started on vanco and zosyn. Pt admits to improved ear pain with drops and pain meds. Denies n/v, vision changes or hearing loss         PAST MEDICAL & SURGICAL HISTORY:  No pertinent past medical history    No significant past surgical history      Allergies    No Known Allergies    Intolerances      MEDICATIONS  (STANDING):  cefepime   IVPB      cefepime   IVPB 2000 milliGRAM(s) IV Intermittent every 8 hours  dextrose 40% Gel 15 Gram(s) Oral once  dextrose 5%. 1000 milliLiter(s) (50 mL/Hr) IV Continuous <Continuous>  dextrose 5%. 1000 milliLiter(s) (100 mL/Hr) IV Continuous <Continuous>  dextrose 50% Injectable 25 Gram(s) IV Push once  dextrose 50% Injectable 12.5 Gram(s) IV Push once  dextrose 50% Injectable 25 Gram(s) IV Push once  glucagon  Injectable 1 milliGRAM(s) IntraMuscular once  insulin lispro (ADMELOG) corrective regimen sliding scale   SubCutaneous three times a day before meals  insulin lispro (ADMELOG) corrective regimen sliding scale   SubCutaneous at bedtime  lisinopril 5 milliGRAM(s) Oral daily  ofloxacin 0.3% Solution 5 Drop(s) Left Ear two times a day  simvastatin 20 milliGRAM(s) Oral at bedtime    MEDICATIONS  (PRN):  ketorolac   Injectable 15 milliGRAM(s) IV Push every 6 hours PRN Moderate Pain (4 - 6)  oxycodone    5 mG/acetaminophen 325 mG 1 Tablet(s) Oral every 6 hours PRN Moderate Pain (4 - 6)      Social History: see consult    Family history: see consult    ROS:   ENT: all negative except as noted in HPI   Pulm: denies SOB, cough, hemoptysis  Neuro: denies numbness/tingling, loss of sensation  Endo: denies heat/cold intolerance, excessive sweating      Vital Signs Last 24 Hrs  T(C): 36.6 (09 Feb 2021 04:35), Max: 37.2 (08 Feb 2021 16:15)  T(F): 97.8 (09 Feb 2021 04:35), Max: 98.9 (08 Feb 2021 16:15)  HR: 62 (09 Feb 2021 04:35) (62 - 83)  BP: 127/72 (09 Feb 2021 04:35) (127/72 - 158/96)  BP(mean): --  RR: 18 (09 Feb 2021 04:35) (16 - 18)  SpO2: 97% (09 Feb 2021 04:35) (95% - 99%)                          13.1   13.09 )-----------( 403      ( 09 Feb 2021 06:30 )             40.1    02-09    140  |  108  |  12  ----------------------------<  104<H>  4.5   |  21<L>  |  0.60    Ca    9.1      09 Feb 2021 06:27         PHYSICAL EXAM:  Gen: NAD  Skin: No rashes, bruises, or lesions  Head: Normocephalic, Atraumatic  Face: no edema, erythema, or fluctuance. Parotid glands soft without mass  Eyes: no scleral injection  Ears: Right - ear canal clear, TM with extensive retraction and thickening, without effusion or erythema. No evidence of any fluid drainage. No mastoid tenderness, erythema, or ear bulging            Left - ear canal clear, TM with extensive retraction and thickening, without effusion or erythema. No mastoid tenderness, erythema, or ear bulging  Nose: Nares bilaterally patent, no discharge  Mouth: No Stridor / Drooling / Trismus.  Mucosa moist, tongue/uvula midline, oropharynx clear  Neck: Flat, supple, no lymphadenopathy, trachea midline, no masses  Lymphatic: No lymphadenopathy  Resp: breathing easily, no stridor  Neuro: facial nerve intact, no facial droop           ENT ISSUE/POD: skull base osteo, chronic L OM    HPI: 56 yo male with ear pain/headache, found to have skull base osteomyelitis on MRI. Pt admitted and started on vanco and zosyn. Pt admits to improved ear pain with drops and pain meds. Denies n/v, vision changes or hearing loss. Awaiting PICC line placement for d/c.    PAST MEDICAL & SURGICAL HISTORY:  No pertinent past medical history    No significant past surgical history      Allergies    No Known Allergies    Intolerances      MEDICATIONS  (STANDING):  cefepime   IVPB      cefepime   IVPB 2000 milliGRAM(s) IV Intermittent every 8 hours  dextrose 40% Gel 15 Gram(s) Oral once  dextrose 5%. 1000 milliLiter(s) (50 mL/Hr) IV Continuous <Continuous>  dextrose 5%. 1000 milliLiter(s) (100 mL/Hr) IV Continuous <Continuous>  dextrose 50% Injectable 25 Gram(s) IV Push once  dextrose 50% Injectable 12.5 Gram(s) IV Push once  dextrose 50% Injectable 25 Gram(s) IV Push once  glucagon  Injectable 1 milliGRAM(s) IntraMuscular once  insulin lispro (ADMELOG) corrective regimen sliding scale   SubCutaneous three times a day before meals  insulin lispro (ADMELOG) corrective regimen sliding scale   SubCutaneous at bedtime  lisinopril 5 milliGRAM(s) Oral daily  ofloxacin 0.3% Solution 5 Drop(s) Left Ear two times a day  simvastatin 20 milliGRAM(s) Oral at bedtime    MEDICATIONS  (PRN):  ketorolac   Injectable 15 milliGRAM(s) IV Push every 6 hours PRN Moderate Pain (4 - 6)  oxycodone    5 mG/acetaminophen 325 mG 1 Tablet(s) Oral every 6 hours PRN Moderate Pain (4 - 6)      Social History: see consult    Family history: see consult    ROS:   ENT: all negative except as noted in HPI   Pulm: denies SOB, cough, hemoptysis  Neuro: denies numbness/tingling, loss of sensation  Endo: denies heat/cold intolerance, excessive sweating      Vital Signs Last 24 Hrs  T(C): 36.6 (09 Feb 2021 04:35), Max: 37.2 (08 Feb 2021 16:15)  T(F): 97.8 (09 Feb 2021 04:35), Max: 98.9 (08 Feb 2021 16:15)  HR: 62 (09 Feb 2021 04:35) (62 - 83)  BP: 127/72 (09 Feb 2021 04:35) (127/72 - 158/96)  BP(mean): --  RR: 18 (09 Feb 2021 04:35) (16 - 18)  SpO2: 97% (09 Feb 2021 04:35) (95% - 99%)                          13.1   13.09 )-----------( 403      ( 09 Feb 2021 06:30 )             40.1    02-09    140  |  108  |  12  ----------------------------<  104<H>  4.5   |  21<L>  |  0.60    Ca    9.1      09 Feb 2021 06:27         PHYSICAL EXAM:  Gen: NAD  Skin: No rashes, bruises, or lesions  Head: Normocephalic, Atraumatic  Face: no edema, erythema, or fluctuance. Parotid glands soft without mass  Eyes: no scleral injection  Ears: Right - ear canal clear, TM with extensive retraction and thickening, without effusion or erythema. No evidence of any fluid drainage. No mastoid tenderness, erythema, or ear bulging            Left - ear canal clear, TM with extensive retraction and thickening, without effusion or erythema. No mastoid tenderness, erythema, or ear bulging  Nose: Nares bilaterally patent, no discharge  Mouth: No Stridor / Drooling / Trismus.  Mucosa moist, tongue/uvula midline, oropharynx clear  Neck: Flat, supple, no lymphadenopathy, trachea midline, no masses  Lymphatic: No lymphadenopathy  Resp: breathing easily, no stridor  Neuro: facial nerve intact, no facial droop

## 2021-02-10 LAB
ANION GAP SERPL CALC-SCNC: 12 MMOL/L — SIGNIFICANT CHANGE UP (ref 5–17)
BUN SERPL-MCNC: 14 MG/DL — SIGNIFICANT CHANGE UP (ref 7–23)
CALCIUM SERPL-MCNC: 9.1 MG/DL — SIGNIFICANT CHANGE UP (ref 8.4–10.5)
CHLORIDE SERPL-SCNC: 105 MMOL/L — SIGNIFICANT CHANGE UP (ref 96–108)
CO2 SERPL-SCNC: 22 MMOL/L — SIGNIFICANT CHANGE UP (ref 22–31)
CREAT SERPL-MCNC: 0.79 MG/DL — SIGNIFICANT CHANGE UP (ref 0.5–1.3)
GLUCOSE BLDC GLUCOMTR-MCNC: 109 MG/DL — HIGH (ref 70–99)
GLUCOSE BLDC GLUCOMTR-MCNC: 149 MG/DL — HIGH (ref 70–99)
GLUCOSE BLDC GLUCOMTR-MCNC: 94 MG/DL — SIGNIFICANT CHANGE UP (ref 70–99)
GLUCOSE BLDC GLUCOMTR-MCNC: 95 MG/DL — SIGNIFICANT CHANGE UP (ref 70–99)
GLUCOSE SERPL-MCNC: 112 MG/DL — HIGH (ref 70–99)
HCT VFR BLD CALC: 41 % — SIGNIFICANT CHANGE UP (ref 39–50)
HGB BLD-MCNC: 13.4 G/DL — SIGNIFICANT CHANGE UP (ref 13–17)
MCHC RBC-ENTMCNC: 28.6 PG — SIGNIFICANT CHANGE UP (ref 27–34)
MCHC RBC-ENTMCNC: 32.7 GM/DL — SIGNIFICANT CHANGE UP (ref 32–36)
MCV RBC AUTO: 87.6 FL — SIGNIFICANT CHANGE UP (ref 80–100)
NRBC # BLD: 0 /100 WBCS — SIGNIFICANT CHANGE UP (ref 0–0)
PLATELET # BLD AUTO: 406 K/UL — HIGH (ref 150–400)
POTASSIUM SERPL-MCNC: 4.4 MMOL/L — SIGNIFICANT CHANGE UP (ref 3.5–5.3)
POTASSIUM SERPL-SCNC: 4.4 MMOL/L — SIGNIFICANT CHANGE UP (ref 3.5–5.3)
RBC # BLD: 4.68 M/UL — SIGNIFICANT CHANGE UP (ref 4.2–5.8)
RBC # FLD: 14.2 % — SIGNIFICANT CHANGE UP (ref 10.3–14.5)
SODIUM SERPL-SCNC: 139 MMOL/L — SIGNIFICANT CHANGE UP (ref 135–145)
VZV DNA, PCR RESULT: NEGATIVE — SIGNIFICANT CHANGE UP
WBC # BLD: 11.63 K/UL — HIGH (ref 3.8–10.5)
WBC # FLD AUTO: 11.63 K/UL — HIGH (ref 3.8–10.5)

## 2021-02-10 RX ORDER — SODIUM CHLORIDE 9 MG/ML
10 INJECTION INTRAMUSCULAR; INTRAVENOUS; SUBCUTANEOUS
Qty: 1410 | Refills: 0
Start: 2021-02-10 | End: 2021-03-28

## 2021-02-10 RX ADMIN — Medication 15 MILLIGRAM(S): at 15:11

## 2021-02-10 RX ADMIN — OXYCODONE AND ACETAMINOPHEN 1 TABLET(S): 5; 325 TABLET ORAL at 18:48

## 2021-02-10 RX ADMIN — LISINOPRIL 5 MILLIGRAM(S): 2.5 TABLET ORAL at 05:09

## 2021-02-10 RX ADMIN — Medication 15 MILLIGRAM(S): at 20:46

## 2021-02-10 RX ADMIN — Medication 5 DROP(S): at 18:41

## 2021-02-10 RX ADMIN — Medication 15 MILLIGRAM(S): at 02:44

## 2021-02-10 RX ADMIN — Medication 5 DROP(S): at 05:09

## 2021-02-10 RX ADMIN — SIMVASTATIN 20 MILLIGRAM(S): 20 TABLET, FILM COATED ORAL at 20:46

## 2021-02-10 RX ADMIN — CEFEPIME 100 MILLIGRAM(S): 1 INJECTION, POWDER, FOR SOLUTION INTRAMUSCULAR; INTRAVENOUS at 21:38

## 2021-02-10 RX ADMIN — OXYCODONE AND ACETAMINOPHEN 1 TABLET(S): 5; 325 TABLET ORAL at 05:09

## 2021-02-10 RX ADMIN — CEFEPIME 100 MILLIGRAM(S): 1 INJECTION, POWDER, FOR SOLUTION INTRAMUSCULAR; INTRAVENOUS at 15:57

## 2021-02-10 RX ADMIN — CEFEPIME 100 MILLIGRAM(S): 1 INJECTION, POWDER, FOR SOLUTION INTRAMUSCULAR; INTRAVENOUS at 05:09

## 2021-02-10 NOTE — PROGRESS NOTE ADULT - PROBLEM SELECTOR PROBLEM 1
Left ear pain
Osteomyelitis of skull
Left ear pain
Left ear pain
Osteomyelitis of skull

## 2021-02-10 NOTE — PROGRESS NOTE ADULT - PROBLEM SELECTOR PLAN 4
hold oral meds  -c/w Insulin / FSBS

## 2021-02-10 NOTE — PROGRESS NOTE ADULT - PROBLEM SELECTOR PLAN 2
seen by ENT   -f/u on recommendation   -c/w IV abx
seen by ENT   -no need for biopsy or debriedment   -c/w IV abx
-Long term abx per ID, plan for PICC line placement  -Pain control; consider adding oxy or low dose morphine for breakthrough pain.
seen by ENT   -f/u on recommendation   -c/w IV abx
-Long term abx per ID  -Pain control; consider adding oxy or low dose morphine for breakthrough pain
seen by ENT   -f/u on recommendation   -c/w IV abx

## 2021-02-10 NOTE — PROGRESS NOTE ADULT - PROBLEM SELECTOR PLAN 3
likely 2/2 above condition   -c/w pain meds

## 2021-02-10 NOTE — PROGRESS NOTE ADULT - SUBJECTIVE AND OBJECTIVE BOX
Patient is a 55y old  Male who presents with a chief complaint of left sided periauricular pain and sever HA (09 Feb 2021 20:43)    pt. seen and examined , will need IV abx at home   INTERVAL HPI/OVERNIGHT EVENTS:  T(C): 36.5 (02-10-21 @ 16:48), Max: 36.9 (02-10-21 @ 08:43)  HR: 63 (02-10-21 @ 16:48) (61 - 67)  BP: 161/82 (02-10-21 @ 16:48) (155/81 - 162/79)  RR: 16 (02-10-21 @ 16:48) (16 - 17)  SpO2: 96% (02-10-21 @ 16:48) (95% - 98%)  Wt(kg): --  I&O's Summary    09 Feb 2021 07:01  -  10 Feb 2021 07:00  --------------------------------------------------------  IN: 1030 mL / OUT: 0 mL / NET: 1030 mL    10 Feb 2021 07:01  -  10 Feb 2021 19:50  --------------------------------------------------------  IN: 500 mL / OUT: 0 mL / NET: 500 mL        PAST MEDICAL & SURGICAL HISTORY:  No pertinent past medical history    No significant past surgical history        SOCIAL HISTORY  Alcohol:  Tobacco:  Illicit substance use:    FAMILY HISTORY:    REVIEW OF SYSTEMS:  CONSTITUTIONAL: No fever, weight loss, or fatigue  EYES: No eye pain, visual disturbances, or discharge  ENMT:  No difficulty hearing, tinnitus, vertigo; No sinus or throat pain  NECK: No pain or stiffness  RESPIRATORY: No cough, wheezing, chills or hemoptysis; No shortness of breath  CARDIOVASCULAR: No chest pain, palpitations, dizziness, or leg swelling  GASTROINTESTINAL: No abdominal or epigastric pain. No nausea, vomiting, or hematemesis; No diarrhea or constipation. No melena or hematochezia.  GENITOURINARY: No dysuria, frequency, hematuria, or incontinence  NEUROLOGICAL: No headaches, memory loss, loss of strength, numbness, or tremors  SKIN: No itching, burning, rashes, or lesions   LYMPH NODES: No enlarged glands  ENDOCRINE: No heat or cold intolerance; No hair loss  MUSCULOSKELETAL: No joint pain or swelling; No muscle, back, or extremity pain  PSYCHIATRIC: No depression, anxiety, mood swings, or difficulty sleeping  HEME/LYMPH: No easy bruising, or bleeding gums  ALLERY AND IMMUNOLOGIC: No hives or eczema    RADIOLOGY & ADDITIONAL TESTS:    Imaging Personally Reviewed:  [ ] YES  [ ] NO    Consultant(s) Notes Reviewed:  [ ] YES  [ ] NO    PHYSICAL EXAM:  GENERAL: NAD, well-groomed, well-developed  HEAD:  Atraumatic, Normocephalic  EYES: EOMI, PERRLA, conjunctiva and sclera clear  ENMT: No tonsillar erythema, exudates, or enlargement; Moist mucous membranes, Good dentition, No lesions  NECK: Supple, No JVD, Normal thyroid  NERVOUS SYSTEM:  Alert & Oriented X3, Good concentration; Motor Strength 5/5 B/L upper and lower extremities; DTRs 2+ intact and symmetric  CHEST/LUNG: Clear to percussion bilaterally; No rales, rhonchi, wheezing, or rubs  HEART: Regular rate and rhythm; No murmurs, rubs, or gallops  ABDOMEN: Soft, Nontender, Nondistended; Bowel sounds present  EXTREMITIES:  2+ Peripheral Pulses, No clubbing, cyanosis, or edema  LYMPH: No lymphadenopathy noted  SKIN: No rashes or lesions    LABS:                        13.4   11.63 )-----------( 406      ( 10 Feb 2021 07:06 )             41.0     02-10    139  |  105  |  14  ----------------------------<  112<H>  4.4   |  22  |  0.79    Ca    9.1      10 Feb 2021 06:57          CAPILLARY BLOOD GLUCOSE      POCT Blood Glucose.: 95 mg/dL (10 Feb 2021 17:42)  POCT Blood Glucose.: 149 mg/dL (10 Feb 2021 14:29)  POCT Blood Glucose.: 109 mg/dL (10 Feb 2021 08:58)  POCT Blood Glucose.: 144 mg/dL (09 Feb 2021 21:20)            MEDICATIONS  (STANDING):  cefepime   IVPB      cefepime   IVPB 2000 milliGRAM(s) IV Intermittent every 8 hours  dextrose 40% Gel 15 Gram(s) Oral once  dextrose 5%. 1000 milliLiter(s) (50 mL/Hr) IV Continuous <Continuous>  dextrose 5%. 1000 milliLiter(s) (100 mL/Hr) IV Continuous <Continuous>  dextrose 50% Injectable 25 Gram(s) IV Push once  dextrose 50% Injectable 12.5 Gram(s) IV Push once  dextrose 50% Injectable 25 Gram(s) IV Push once  glucagon  Injectable 1 milliGRAM(s) IntraMuscular once  insulin lispro (ADMELOG) corrective regimen sliding scale   SubCutaneous at bedtime  insulin lispro (ADMELOG) corrective regimen sliding scale   SubCutaneous three times a day before meals  lisinopril 5 milliGRAM(s) Oral daily  ofloxacin 0.3% Solution 5 Drop(s) Left Ear two times a day  simvastatin 20 milliGRAM(s) Oral at bedtime    MEDICATIONS  (PRN):  ketorolac   Injectable 15 milliGRAM(s) IV Push every 6 hours PRN Moderate Pain (4 - 6)  oxycodone    5 mG/acetaminophen 325 mG 1 Tablet(s) Oral every 6 hours PRN Moderate Pain (4 - 6)      Care Discussed with Consultants/Other Providers [ ] YES  [ ] NO

## 2021-02-10 NOTE — PROGRESS NOTE ADULT - ATTENDING COMMENTS
no surgical intervention . plan for PICC/Med line for long term IV abx   once home IV abx / OPAT set up,  plan for d/c home
no surgical intervention . plan for PICC/Med line for long term IV abx   once home IV abx / OPAT set up,  plan for d/c home
no surgical intervention .  plan for d/c home on IV abx . f/u with ID as out pt and ENT
ENT to recommend regarding biopsy ?  will need medline/ PICC line for long term IV abx

## 2021-02-10 NOTE — PROGRESS NOTE ADULT - PROBLEM SELECTOR PLAN 1
MRI shows infiltrating mass at base of skull   -possible OM of skull bone   -seen by ID: started on abx   -recommend ENT eval for either dbridement or biopsy: done and ENT decided against it.

## 2021-02-10 NOTE — PROGRESS NOTE ADULT - PROBLEM SELECTOR PROBLEM 2
Mastoiditis of left side
Osteomyelitis of skull
Mastoiditis of left side
Osteomyelitis of skull
Mastoiditis of left side
Mastoiditis of left side

## 2021-02-11 ENCOUNTER — TRANSCRIPTION ENCOUNTER (OUTPATIENT)
Age: 56
End: 2021-02-11

## 2021-02-11 VITALS
OXYGEN SATURATION: 98 % | SYSTOLIC BLOOD PRESSURE: 137 MMHG | TEMPERATURE: 99 F | DIASTOLIC BLOOD PRESSURE: 79 MMHG | HEART RATE: 61 BPM | RESPIRATION RATE: 18 BRPM

## 2021-02-11 LAB
ANION GAP SERPL CALC-SCNC: 13 MMOL/L — SIGNIFICANT CHANGE UP (ref 5–17)
BUN SERPL-MCNC: 17 MG/DL — SIGNIFICANT CHANGE UP (ref 7–23)
CALCIUM SERPL-MCNC: 9.2 MG/DL — SIGNIFICANT CHANGE UP (ref 8.4–10.5)
CHLORIDE SERPL-SCNC: 104 MMOL/L — SIGNIFICANT CHANGE UP (ref 96–108)
CO2 SERPL-SCNC: 21 MMOL/L — LOW (ref 22–31)
CREAT SERPL-MCNC: 0.8 MG/DL — SIGNIFICANT CHANGE UP (ref 0.5–1.3)
CULTURE RESULTS: SIGNIFICANT CHANGE UP
CULTURE RESULTS: SIGNIFICANT CHANGE UP
GLUCOSE BLDC GLUCOMTR-MCNC: 109 MG/DL — HIGH (ref 70–99)
GLUCOSE SERPL-MCNC: 112 MG/DL — HIGH (ref 70–99)
HCT VFR BLD CALC: 40.6 % — SIGNIFICANT CHANGE UP (ref 39–50)
HGB BLD-MCNC: 13.7 G/DL — SIGNIFICANT CHANGE UP (ref 13–17)
MCHC RBC-ENTMCNC: 29.1 PG — SIGNIFICANT CHANGE UP (ref 27–34)
MCHC RBC-ENTMCNC: 33.7 GM/DL — SIGNIFICANT CHANGE UP (ref 32–36)
MCV RBC AUTO: 86.2 FL — SIGNIFICANT CHANGE UP (ref 80–100)
NRBC # BLD: 0 /100 WBCS — SIGNIFICANT CHANGE UP (ref 0–0)
PLATELET # BLD AUTO: 391 K/UL — SIGNIFICANT CHANGE UP (ref 150–400)
POTASSIUM SERPL-MCNC: 4.3 MMOL/L — SIGNIFICANT CHANGE UP (ref 3.5–5.3)
POTASSIUM SERPL-SCNC: 4.3 MMOL/L — SIGNIFICANT CHANGE UP (ref 3.5–5.3)
RBC # BLD: 4.71 M/UL — SIGNIFICANT CHANGE UP (ref 4.2–5.8)
RBC # FLD: 13.9 % — SIGNIFICANT CHANGE UP (ref 10.3–14.5)
SODIUM SERPL-SCNC: 138 MMOL/L — SIGNIFICANT CHANGE UP (ref 135–145)
SPECIMEN SOURCE: SIGNIFICANT CHANGE UP
SPECIMEN SOURCE: SIGNIFICANT CHANGE UP
WBC # BLD: 13.98 K/UL — HIGH (ref 3.8–10.5)
WBC # FLD AUTO: 13.98 K/UL — HIGH (ref 3.8–10.5)

## 2021-02-11 PROCEDURE — 70491 CT SOFT TISSUE NECK W/DYE: CPT

## 2021-02-11 PROCEDURE — 86003 ALLG SPEC IGE CRUDE XTRC EA: CPT

## 2021-02-11 PROCEDURE — 85014 HEMATOCRIT: CPT

## 2021-02-11 PROCEDURE — 70496 CT ANGIOGRAPHY HEAD: CPT

## 2021-02-11 PROCEDURE — 83690 ASSAY OF LIPASE: CPT

## 2021-02-11 PROCEDURE — 85652 RBC SED RATE AUTOMATED: CPT

## 2021-02-11 PROCEDURE — 85027 COMPLETE CBC AUTOMATED: CPT

## 2021-02-11 PROCEDURE — C1751: CPT

## 2021-02-11 PROCEDURE — 82803 BLOOD GASES ANY COMBINATION: CPT

## 2021-02-11 PROCEDURE — 71045 X-RAY EXAM CHEST 1 VIEW: CPT

## 2021-02-11 PROCEDURE — 70553 MRI BRAIN STEM W/O & W/DYE: CPT

## 2021-02-11 PROCEDURE — 84132 ASSAY OF SERUM POTASSIUM: CPT

## 2021-02-11 PROCEDURE — 86140 C-REACTIVE PROTEIN: CPT

## 2021-02-11 PROCEDURE — 82947 ASSAY GLUCOSE BLOOD QUANT: CPT

## 2021-02-11 PROCEDURE — 99232 SBSQ HOSP IP/OBS MODERATE 35: CPT

## 2021-02-11 PROCEDURE — 80074 ACUTE HEPATITIS PANEL: CPT

## 2021-02-11 PROCEDURE — 96375 TX/PRO/DX INJ NEW DRUG ADDON: CPT | Mod: XU

## 2021-02-11 PROCEDURE — 70460 CT HEAD/BRAIN W/DYE: CPT

## 2021-02-11 PROCEDURE — 82435 ASSAY OF BLOOD CHLORIDE: CPT

## 2021-02-11 PROCEDURE — 87081 CULTURE SCREEN ONLY: CPT

## 2021-02-11 PROCEDURE — 99285 EMERGENCY DEPT VISIT HI MDM: CPT | Mod: 25

## 2021-02-11 PROCEDURE — 83605 ASSAY OF LACTIC ACID: CPT

## 2021-02-11 PROCEDURE — 82330 ASSAY OF CALCIUM: CPT

## 2021-02-11 PROCEDURE — 96376 TX/PRO/DX INJ SAME DRUG ADON: CPT | Mod: XU

## 2021-02-11 PROCEDURE — 86769 SARS-COV-2 COVID-19 ANTIBODY: CPT

## 2021-02-11 PROCEDURE — 87641 MR-STAPH DNA AMP PROBE: CPT

## 2021-02-11 PROCEDURE — 80053 COMPREHEN METABOLIC PANEL: CPT

## 2021-02-11 PROCEDURE — 87880 STREP A ASSAY W/OPTIC: CPT

## 2021-02-11 PROCEDURE — 0225U NFCT DS DNA&RNA 21 SARSCOV2: CPT

## 2021-02-11 PROCEDURE — 84295 ASSAY OF SERUM SODIUM: CPT

## 2021-02-11 PROCEDURE — 36569 INSJ PICC 5 YR+ W/O IMAGING: CPT

## 2021-02-11 PROCEDURE — A9585: CPT

## 2021-02-11 PROCEDURE — 82550 ASSAY OF CK (CPK): CPT

## 2021-02-11 PROCEDURE — 87040 BLOOD CULTURE FOR BACTERIA: CPT

## 2021-02-11 PROCEDURE — 80048 BASIC METABOLIC PNL TOTAL CA: CPT

## 2021-02-11 PROCEDURE — 82565 ASSAY OF CREATININE: CPT

## 2021-02-11 PROCEDURE — 86803 HEPATITIS C AB TEST: CPT

## 2021-02-11 PROCEDURE — 83036 HEMOGLOBIN GLYCOSYLATED A1C: CPT

## 2021-02-11 PROCEDURE — G0378: CPT

## 2021-02-11 PROCEDURE — 85025 COMPLETE CBC W/AUTO DIFF WBC: CPT

## 2021-02-11 PROCEDURE — 80307 DRUG TEST PRSMV CHEM ANLYZR: CPT

## 2021-02-11 PROCEDURE — 96374 THER/PROPH/DIAG INJ IV PUSH: CPT | Mod: XU

## 2021-02-11 PROCEDURE — 85018 HEMOGLOBIN: CPT

## 2021-02-11 PROCEDURE — 87798 DETECT AGENT NOS DNA AMP: CPT

## 2021-02-11 PROCEDURE — 86703 HIV-1/HIV-2 1 RESULT ANTBDY: CPT

## 2021-02-11 PROCEDURE — 87640 STAPH A DNA AMP PROBE: CPT

## 2021-02-11 PROCEDURE — 80202 ASSAY OF VANCOMYCIN: CPT

## 2021-02-11 PROCEDURE — 82962 GLUCOSE BLOOD TEST: CPT

## 2021-02-11 RX ORDER — OFLOXACIN OTIC SOLUTION 3 MG/ML
5 SOLUTION/ DROPS AURICULAR (OTIC)
Qty: 7 | Refills: 0
Start: 2021-02-11 | End: 2021-03-12

## 2021-02-11 RX ORDER — KETOROLAC TROMETHAMINE 30 MG/ML
15 SYRINGE (ML) INJECTION ONCE
Refills: 0 | Status: DISCONTINUED | OUTPATIENT
Start: 2021-02-11 | End: 2021-02-11

## 2021-02-11 RX ORDER — LISINOPRIL 2.5 MG/1
1 TABLET ORAL
Qty: 30 | Refills: 0
Start: 2021-02-11 | End: 2021-03-12

## 2021-02-11 RX ORDER — KETOROLAC TROMETHAMINE 30 MG/ML
10 SYRINGE (ML) INJECTION ONCE
Refills: 0 | Status: DISCONTINUED | OUTPATIENT
Start: 2021-02-11 | End: 2021-02-11

## 2021-02-11 RX ORDER — CEFEPIME 1 G/1
2 INJECTION, POWDER, FOR SOLUTION INTRAMUSCULAR; INTRAVENOUS
Qty: 282 | Refills: 0
Start: 2021-02-11 | End: 2021-03-29

## 2021-02-11 RX ORDER — OFLOXACIN 0.3 %
5 DROPS OPHTHALMIC (EYE)
Qty: 0 | Refills: 0 | DISCHARGE
Start: 2021-02-11

## 2021-02-11 RX ORDER — LISINOPRIL 2.5 MG/1
1 TABLET ORAL
Qty: 0 | Refills: 0 | DISCHARGE
Start: 2021-02-11

## 2021-02-11 RX ORDER — SIMVASTATIN 20 MG/1
1 TABLET, FILM COATED ORAL
Qty: 0 | Refills: 0 | DISCHARGE
Start: 2021-02-11

## 2021-02-11 RX ORDER — KETOROLAC TROMETHAMINE 30 MG/ML
1 SYRINGE (ML) INJECTION
Qty: 60 | Refills: 0
Start: 2021-02-11 | End: 2021-02-25

## 2021-02-11 RX ORDER — SIMVASTATIN 20 MG/1
1 TABLET, FILM COATED ORAL
Qty: 30 | Refills: 0
Start: 2021-02-11 | End: 2021-03-12

## 2021-02-11 RX ADMIN — Medication 15 MILLIGRAM(S): at 05:10

## 2021-02-11 RX ADMIN — OXYCODONE AND ACETAMINOPHEN 1 TABLET(S): 5; 325 TABLET ORAL at 10:04

## 2021-02-11 RX ADMIN — Medication 5 DROP(S): at 05:13

## 2021-02-11 RX ADMIN — CEFEPIME 100 MILLIGRAM(S): 1 INJECTION, POWDER, FOR SOLUTION INTRAMUSCULAR; INTRAVENOUS at 05:12

## 2021-02-11 RX ADMIN — LISINOPRIL 5 MILLIGRAM(S): 2.5 TABLET ORAL at 05:13

## 2021-02-11 NOTE — PROGRESS NOTE ADULT - PROVIDER SPECIALTY LIST ADULT
ENT
Infectious Disease
ENT
Infectious Disease
ENT
Internal Medicine

## 2021-02-11 NOTE — PROGRESS NOTE ADULT - SUBJECTIVE AND OBJECTIVE BOX
Patient is a 55y old  Male who presents with a chief complaint of left sided periauricular pain and sever HA (10 Feb 2021 19:50)    Being followed by ID for mastoiditis    Interval history:s/p PICC  for Dc today  OPATs etup  pain improved   No acute events      ROS:  No cough,SOB,CP  No N/V/D./abd pain  No other complaints      Antimicrobials:    cefepime   IVPB      cefepime   IVPB 2000 milliGRAM(s) IV Intermittent every 8 hours    Other medications reviewed    Vital Signs Last 24 Hrs  T(C): 37.2 (02-11-21 @ 08:32), Max: 37.2 (02-11-21 @ 08:32)  T(F): 98.9 (02-11-21 @ 08:32), Max: 98.9 (02-11-21 @ 08:32)  HR: 61 (02-11-21 @ 08:32) (61 - 66)  BP: 137/79 (02-11-21 @ 08:32) (136/69 - 161/82)  BP(mean): --  RR: 18 (02-11-21 @ 08:32) (16 - 18)  SpO2: 98% (02-11-21 @ 08:32) (95% - 98%)    Physical Exam:    HEENT PERRLA EOMI    left ear and preauricualr tenderness  No ear discharge     No oral exudate or erythema    Chest Good AE,CTA    CVS RRR S1 S2 WNl No murmur or rub or gallop    Abd soft BS normal No tenderness no masses    IV site no erythema tenderness or discharge    CNS AAO X 3 no focal      Lab Data:                          13.7   13.98 )-----------( 391      ( 11 Feb 2021 06:51 )             40.6     WBC Count: 13.98 (02-11-21 @ 06:51)  WBC Count: 11.63 (02-10-21 @ 07:06)  WBC Count: 13.09 (02-09-21 @ 06:30)  WBC Count: 12.92 (02-08-21 @ 06:56)  WBC Count: 11.13 (02-07-21 @ 06:27)  WBC Count: 13.74 (02-04-21 @ 21:28)    02-11    138  |  104  |  17  ----------------------------<  112<H>  4.3   |  21<L>  |  0.80    Ca    9.2      11 Feb 2021 06:51          Culture - Blood (collected 06 Feb 2021 21:08)  Source: .Blood Blood-Peripheral  Preliminary Report (07 Feb 2021 22:02):    No growth to date.    Culture - Blood (collected 06 Feb 2021 21:08)  Source: .Blood Blood-Peripheral  Preliminary Report (07 Feb 2021 22:02):    No growth to date.          < from: Xray Chest 1 View- PORTABLE-Urgent (Xray Chest 1 View- PORTABLE-Urgent .) (02.09.21 @ 12:25) >  Impression:    The heart is normal in size. The lungs are clear. No pleural effusion. No pneumothorax. A PICC line is seen on the right and the tip is in superior vena cava.          < end of copied text >

## 2021-02-11 NOTE — DISCHARGE NOTE NURSING/CASE MANAGEMENT/SOCIAL WORK - NSDCFUADDAPPT_GEN_ALL_CORE_FT
Please follow up with your primary care provider within 1 week of discharge.  Please schedule weekly appointments with your medical provider for routine blood work (CBC and CMP) and have the results faxed to Dr. Cobb @ [phone].    Please call the infectious disease clinic @ [phone] to schedule an appointment with Dr. Cobb in 3 or 4 weeks.

## 2021-02-11 NOTE — PROGRESS NOTE ADULT - REASON FOR ADMISSION
left sided periauricular pain and sever HA
left sided periauricular pain and severe HA
left sided periauricular pain and sever HA

## 2021-03-03 ENCOUNTER — APPOINTMENT (OUTPATIENT)
Dept: OTOLARYNGOLOGY | Facility: CLINIC | Age: 56
End: 2021-03-03
Payer: MEDICAID

## 2021-03-03 VITALS
DIASTOLIC BLOOD PRESSURE: 82 MMHG | BODY MASS INDEX: 25.83 KG/M2 | TEMPERATURE: 97.6 F | SYSTOLIC BLOOD PRESSURE: 148 MMHG | WEIGHT: 155 LBS | HEART RATE: 72 BPM | HEIGHT: 65 IN

## 2021-03-03 DIAGNOSIS — J00 ACUTE NASOPHARYNGITIS [COMMON COLD]: ICD-10-CM

## 2021-03-03 PROCEDURE — 99214 OFFICE O/P EST MOD 30 MIN: CPT | Mod: 25

## 2021-03-03 PROCEDURE — 92567 TYMPANOMETRY: CPT

## 2021-03-03 PROCEDURE — 99072 ADDL SUPL MATRL&STAF TM PHE: CPT

## 2021-03-03 PROCEDURE — 92511 NASOPHARYNGOSCOPY: CPT

## 2021-03-03 PROCEDURE — 92557 COMPREHENSIVE HEARING TEST: CPT

## 2021-03-04 ENCOUNTER — OUTPATIENT (OUTPATIENT)
Dept: OUTPATIENT SERVICES | Facility: HOSPITAL | Age: 56
LOS: 1 days | End: 2021-03-04
Payer: MEDICAID

## 2021-03-04 ENCOUNTER — APPOINTMENT (OUTPATIENT)
Dept: INFECTIOUS DISEASE | Facility: CLINIC | Age: 56
End: 2021-03-04
Payer: MEDICAID

## 2021-03-04 VITALS
WEIGHT: 162 LBS | TEMPERATURE: 98.1 F | HEART RATE: 95 BPM | BODY MASS INDEX: 26.99 KG/M2 | DIASTOLIC BLOOD PRESSURE: 85 MMHG | HEIGHT: 65 IN | RESPIRATION RATE: 14 BRPM | OXYGEN SATURATION: 98 % | SYSTOLIC BLOOD PRESSURE: 157 MMHG

## 2021-03-04 VITALS — DIASTOLIC BLOOD PRESSURE: 85 MMHG | SYSTOLIC BLOOD PRESSURE: 144 MMHG

## 2021-03-04 DIAGNOSIS — M86.9 OSTEOMYELITIS, UNSPECIFIED: ICD-10-CM

## 2021-03-04 DIAGNOSIS — B97.89 OTHER VIRAL AGENTS AS THE CAUSE OF DISEASES CLASSIFIED ELSEWHERE: ICD-10-CM

## 2021-03-04 PROCEDURE — G0463: CPT

## 2021-03-04 PROCEDURE — 99214 OFFICE O/P EST MOD 30 MIN: CPT

## 2021-03-04 NOTE — ASSESSMENT
[FreeTextEntry1] : Patient with hx of DM presents s/p ED discharge for Malignant OE \par \par Left Malignant OE:\par -continue IV abx  and ear drop therapy\par -H2O precautions reviewed\par -Advised to keep DM under control \par -f/u with ID \par discontinue Q-tip usage\par \par nasopharynx- normal\par MRI findings likely inflammatory and is now resolving\par \par -Hearing Test performed to evaluate the extent of hearing loss and to explain pt's symptoms\par bilateral sensorineural hearing loss-cleared for hearing aids After infection resolves\par \par f/u 2 weeks\par will possibly discontinued eardrop therapy at that time

## 2021-03-04 NOTE — HISTORY OF PRESENT ILLNESS
[FreeTextEntry1] : 57 yo man recently seen in hospital in consultation for Left ear infection/Osteomyelitis/mastoiditis\par was seen by ENT no surgical intervention\par was started on IV [id number] to cefepime and discharged home on same\par 6 week course planned\par Pt now for follow up\par feels better\par Tolerating meds\par had initial diarrhea-now resolved\par ear pain better\par followe dwith ENT

## 2021-03-04 NOTE — REVIEW OF SYSTEMS
[Fever] : no fever [Chills] : no chills [Eye Pain] : no eye pain [Earache] : no earache [Loss Of Hearing] : hearing loss [Nasal Discharge] : no nasal discharge [Sore Throat] : no sore throat [Chest Pain] : no chest pain [Shortness Of Breath] : no shortness of breath [Wheezing] : no wheezing [Abdominal Pain] : no abdominal pain [Joint Pain] : no joint pain [Confused] : no confusion [FreeTextEntry4] : ear and preauricualr pain improved

## 2021-03-04 NOTE — PHYSICAL EXAM
[General Appearance - Alert] : alert [General Appearance - In No Acute Distress] : in no acute distress [General Appearance - Well Nourished] : well nourished [Sclera] : the sclera and conjunctiva were normal [Extraocular Movements] : extraocular movements were intact [Outer Ear] : the ears and nose were normal in appearance [Examination Of The Oral Cavity] : the lips and gums were normal [Neck Appearance] : the appearance of the neck was normal [] : the neck was supple [Neck Cervical Mass (___cm)] : no neck mass was observed [Heart Rate And Rhythm] : heart rate was normal and rhythm regular [Heart Sounds] : normal S1 and S2 [Edema] : there was no peripheral edema [Bowel Sounds] : normal bowel sounds [Abdomen Soft] : soft [Abdomen Tenderness] : non-tender [No Palpable Adenopathy] : no palpable adenopathy [Musculoskeletal - Swelling] : no joint swelling [Skin Color & Pigmentation] : normal skin color and pigmentation [FreeTextEntry1] : R PICC no erythema or tenderness [Cranial Nerves] : cranial nerves 2-12 were intact [Oriented To Time, Place, And Person] : oriented to person, place, and time

## 2021-03-04 NOTE — ASSESSMENT
[FreeTextEntry1] : Left ear mastoiditis, OM\par on IV cefepime\par Improving\par tolerating well\par continue as planned for 6 weeks\par Monitor CBC CMP weekly\par Continue ofloxacin otic drops\par continue follow up with ENT\par \par RTC 4 weeks\par asked to contact us earlier if issues\par Patient verbalized understanding

## 2021-03-04 NOTE — DATA REVIEWED
[de-identified] : Hearing Test performed to evaluate the extent of hearing loss and  to explain pt's symptoms\par today's hearing loss was personally reviewed and revealed\par bilateral mixed hearing loss [de-identified] : personally reviewed and MRI reveals left-sided scleral base inflammation and nasopharyngeal fullness

## 2021-03-04 NOTE — PROCEDURE
[FreeTextEntry6] : \par Anterior Rhinoscopy insufficient to account for symptoms.\par \par After informed verbal consent is obtained, the fiberoptic nasal endoscope #26 is passed via the right nasal cavity.\par The following anatomic sites were directly examined in a sequential fashion:\par The scope was introduced in both  nasal passages between the middle and inferior turbinates to exam the inferior portion of the middle meatus and the fontanelle, as well as the maxillary ostia.  Next, the scope was passed medially and posteriorly to the middle turbinates to examine the sphenoethmoid recess and the superior turbinate region.\par   There is [ 0 ]% obstruction of the nasopharynx with adenoid tissue.\par \par A deviated nasal septum was noted causing obstruction.\par The turbinates were congested-bilateral.\par \par Right Side:\par ·	Mucosa: wnl\par ·	Mucous: none\par ·	Polyp: none\par ·	Inferior Turbinate: sl congested\par ·	Middle Turbinate:sl congested\par ·	Superior Turbinate: wnl\par ·	Inferior Meatus:clear\par ·	Middle Meatus: clear\par ·	Super Meatus: clear\par ·	Sphenoethmoidal Recess: wnl\par \par \par \par Left Side:\par ·	Mucosa: wnl\par ·	Mucous:none\par ·	Polyp: none\par ·	Inferior Turbinate: sl congested\par ·	Middle Turbinate: sl congested\par ·	Superior Turbinate:wnl\par ·	Inferior Meatus: clear\par ·	Middle Meatus: clear\par ·	Super Meatus:clear\par ·	Sphenoethmoidal Recess: wnl\par \par \par

## 2021-03-04 NOTE — PHYSICAL EXAM
[Midline] : trachea located in midline position [Normal] : no rashes [Hearing Loss Right Only] : normal [Hearing Loss Left Only] : normal [de-identified] : TM perf  [de-identified] : TM perf [de-identified] : no focal deficits aside from hearing loss

## 2021-03-04 NOTE — HISTORY OF PRESENT ILLNESS
[No] : patient does not have a  history of radiation therapy [Hearing Loss] : hearing loss [Ear Fullness] : ear fullness [Otalgia] : otalgia [Diabetes] : diabetes [None] : No risk factors have been identified. 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 He started having pain after he used a Qtip in the left ear. He had severe left sided ear pain, in the ED he was given IV abx needs to be on it for 8 weeks. Today he is feeling better, ear pain gets better everyday. \par no other modifying factors\par He is already being followed by infectious diseases\par He has a right-sided intravenous line in his receiving intravenous antibiotic therapy and is developing relief of his symptoms\par \par  [Vertigo] : no vertigo [Eustachian Tube Dysfunction] : no eustachian tube dysfunction [Cholesteatoma] : no cholesteatoma [Early Onset Hearing Loss] : no early onset hearing loss [Stroke] : no stroke [Nasal Congestion] : no nasal congestion [Allergic Rhinitis] : no allergic rhinitis [Asthma] : no asthma [Environmental Allergies] : no environmental allergies [Seasonal Allergies] : no seasonal allergies [Environmental Allergens] : no environmental allergens [Adenoidectomy] : no adenoidectomy [Hyperthyroidism] : no hyperthyroidism [Sialadenitis] : no sialadenitis [Hodgkin Disease] : no hodgkin disease [Non-Hodgkin Lymphoma] : no non-hodgkin lymphoma [Graves Disease] : no graves disease [Thyroid Cancer] : no thyroid cancer

## 2021-03-05 DIAGNOSIS — H60.22 MALIGNANT OTITIS EXTERNA, LEFT EAR: ICD-10-CM

## 2021-03-05 DIAGNOSIS — H72.03 CENTRAL PERFORATION OF TYMPANIC MEMBRANE, BILATERAL: ICD-10-CM

## 2021-03-15 ENCOUNTER — APPOINTMENT (OUTPATIENT)
Dept: OTOLARYNGOLOGY | Facility: CLINIC | Age: 56
End: 2021-03-15
Payer: MEDICAID

## 2021-03-15 VITALS
TEMPERATURE: 97.1 F | SYSTOLIC BLOOD PRESSURE: 140 MMHG | HEART RATE: 99 BPM | HEIGHT: 65 IN | DIASTOLIC BLOOD PRESSURE: 83 MMHG | WEIGHT: 162 LBS | BODY MASS INDEX: 26.99 KG/M2

## 2021-03-15 PROCEDURE — 69210 REMOVE IMPACTED EAR WAX UNI: CPT

## 2021-03-15 PROCEDURE — 99214 OFFICE O/P EST MOD 30 MIN: CPT | Mod: 25

## 2021-03-15 PROCEDURE — 99072 ADDL SUPL MATRL&STAF TM PHE: CPT

## 2021-03-15 NOTE — HISTORY OF PRESENT ILLNESS
[No] : patient does not have a  history of radiation therapy [Hearing Loss] : hearing loss [Ear Fullness] : ear fullness [Otalgia] : otalgia [Diabetes] : diabetes [None] : No risk factors have been identified. 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 He started having pain after he used a Qtip in the left ear. He had severe left sided ear pain, in the ED he was given IV abx needs to be on it for 8 weeks. Today he is feeling better, ear pain gets better everyday. \par no other modifying factors\par He is already being followed by infectious diseases\par He has a right-sided intravenous line in his receiving intravenous antibiotic therapy and is developing relief of his symptoms\par \par  [FreeTextEntry1] : Patient presents for follow up s/p Left Malignant OE. States he is doing better, pain has decreased. Still taking IV abx. \par no other modifying factors\par \par  [Vertigo] : no vertigo [Eustachian Tube Dysfunction] : no eustachian tube dysfunction [Cholesteatoma] : no cholesteatoma [Early Onset Hearing Loss] : no early onset hearing loss [Stroke] : no stroke [Nasal Congestion] : no nasal congestion [Allergic Rhinitis] : no allergic rhinitis [Asthma] : no asthma [Environmental Allergies] : no environmental allergies [Seasonal Allergies] : no seasonal allergies [Environmental Allergens] : no environmental allergens [Adenoidectomy] : no adenoidectomy [Hyperthyroidism] : no hyperthyroidism [Sialadenitis] : no sialadenitis [Hodgkin Disease] : no hodgkin disease [Non-Hodgkin Lymphoma] : no non-hodgkin lymphoma [Graves Disease] : no graves disease [Thyroid Cancer] : no thyroid cancer

## 2021-03-15 NOTE — PHYSICAL EXAM
[Midline] : trachea located in midline position [Normal] : the left mastoid was normal [de-identified] : no focal deficits aside from hearing loss [Hearing Loss Right Only] : normal [Hearing Loss Left Only] : normal [FreeTextEntry8] : wax [de-identified] : TM perf -dry and clean [de-identified] : TM perf-dry and clean [de-identified] : wnl\par no facial weakness noted

## 2021-03-15 NOTE — DATA REVIEWED
[de-identified] : Hearing Test performed to evaluate the extent of hearing loss and  to explain pt's symptoms\par today's hearing loss was personally reviewed and revealed\par bilateral mixed hearing loss [de-identified] : personally reviewed and MRI reveals left-sided scleral base inflammation and nasopharyngeal fullness

## 2021-03-15 NOTE — ASSESSMENT
[FreeTextEntry1] : Patient with hx of DM presents w/ Malignant OE \par \par Left Malignant OE:\par continue to observe for pain and skull base infection and cranial nerve deficit\par -continue IV abx  and ear drop therapy\par -H2O precautions reviewed\par -Advised to keep DM under control \par -continue ear drops QD \par -Left ear cleaned \par -Right ear wax cleaned \par -Pt doing better\par \par \par f/u 2 weeks or prn \par recommend evaluation by IV team to evaluate skin rash at the IV site

## 2021-03-16 ENCOUNTER — NON-APPOINTMENT (OUTPATIENT)
Age: 56
End: 2021-03-16

## 2021-03-16 DIAGNOSIS — Z00.00 ENCOUNTER FOR GENERAL ADULT MEDICAL EXAMINATION W/OUT ABNORMAL FINDINGS: ICD-10-CM

## 2021-03-17 ENCOUNTER — OUTPATIENT (OUTPATIENT)
Dept: OUTPATIENT SERVICES | Facility: HOSPITAL | Age: 56
LOS: 1 days | End: 2021-03-17

## 2021-03-17 ENCOUNTER — APPOINTMENT (OUTPATIENT)
Dept: INFECTIOUS DISEASE | Facility: CLINIC | Age: 56
End: 2021-03-17

## 2021-03-17 DIAGNOSIS — H60.22 MALIGNANT OTITIS EXTERNA, LEFT EAR: ICD-10-CM

## 2021-03-17 DIAGNOSIS — H72.03 CENTRAL PERFORATION OF TYMPANIC MEMBRANE, BILATERAL: ICD-10-CM

## 2021-03-19 ENCOUNTER — INPATIENT (INPATIENT)
Facility: HOSPITAL | Age: 56
LOS: 2 days | Discharge: HOME | DRG: 315 | End: 2021-03-22
Attending: INTERNAL MEDICINE | Admitting: INTERNAL MEDICINE
Payer: MEDICAID

## 2021-03-19 VITALS
RESPIRATION RATE: 16 BRPM | DIASTOLIC BLOOD PRESSURE: 83 MMHG | HEIGHT: 65 IN | OXYGEN SATURATION: 100 % | WEIGHT: 160.06 LBS | SYSTOLIC BLOOD PRESSURE: 137 MMHG | HEART RATE: 106 BPM | TEMPERATURE: 98 F

## 2021-03-19 LAB
ALBUMIN SERPL ELPH-MCNC: 4.4 G/DL — SIGNIFICANT CHANGE UP (ref 3.3–5)
ALP SERPL-CCNC: 86 U/L — SIGNIFICANT CHANGE UP (ref 40–120)
ALT FLD-CCNC: 41 U/L — SIGNIFICANT CHANGE UP (ref 10–45)
ANION GAP SERPL CALC-SCNC: 15 MMOL/L — SIGNIFICANT CHANGE UP (ref 5–17)
APTT BLD: 39.3 SEC — HIGH (ref 27.5–35.5)
AST SERPL-CCNC: 21 U/L — SIGNIFICANT CHANGE UP (ref 10–40)
BASE EXCESS BLDV CALC-SCNC: -1.1 MMOL/L — SIGNIFICANT CHANGE UP (ref -2–2)
BASOPHILS # BLD AUTO: 0.06 K/UL — SIGNIFICANT CHANGE UP (ref 0–0.2)
BASOPHILS NFR BLD AUTO: 0.6 % — SIGNIFICANT CHANGE UP (ref 0–2)
BILIRUB SERPL-MCNC: 0.4 MG/DL — SIGNIFICANT CHANGE UP (ref 0.2–1.2)
BUN SERPL-MCNC: 15 MG/DL — SIGNIFICANT CHANGE UP (ref 7–23)
CA-I SERPL-SCNC: 1.22 MMOL/L — SIGNIFICANT CHANGE UP (ref 1.12–1.3)
CALCIUM SERPL-MCNC: 9.6 MG/DL — SIGNIFICANT CHANGE UP (ref 8.4–10.5)
CHLORIDE BLDV-SCNC: 103 MMOL/L — SIGNIFICANT CHANGE UP (ref 96–108)
CHLORIDE SERPL-SCNC: 98 MMOL/L — SIGNIFICANT CHANGE UP (ref 96–108)
CO2 BLDV-SCNC: 24 MMOL/L — SIGNIFICANT CHANGE UP (ref 22–30)
CO2 SERPL-SCNC: 20 MMOL/L — LOW (ref 22–31)
CREAT SERPL-MCNC: 0.59 MG/DL — SIGNIFICANT CHANGE UP (ref 0.5–1.3)
EOSINOPHIL # BLD AUTO: 0.74 K/UL — HIGH (ref 0–0.5)
EOSINOPHIL NFR BLD AUTO: 7.7 % — HIGH (ref 0–6)
GAS PNL BLDV: 131 MMOL/L — LOW (ref 135–145)
GAS PNL BLDV: SIGNIFICANT CHANGE UP
GAS PNL BLDV: SIGNIFICANT CHANGE UP
GLUCOSE BLDV-MCNC: 316 MG/DL — HIGH (ref 70–99)
GLUCOSE SERPL-MCNC: 294 MG/DL — HIGH (ref 70–99)
HCO3 BLDV-SCNC: 23 MMOL/L — SIGNIFICANT CHANGE UP (ref 21–29)
HCT VFR BLD CALC: 45.5 % — SIGNIFICANT CHANGE UP (ref 39–50)
HCT VFR BLDA CALC: 48 % — SIGNIFICANT CHANGE UP (ref 39–50)
HGB BLD CALC-MCNC: 15.7 G/DL — SIGNIFICANT CHANGE UP (ref 13–17)
HGB BLD-MCNC: 15.3 G/DL — SIGNIFICANT CHANGE UP (ref 13–17)
IMM GRANULOCYTES NFR BLD AUTO: 0.4 % — SIGNIFICANT CHANGE UP (ref 0–1.5)
INR BLD: 1.08 RATIO — SIGNIFICANT CHANGE UP (ref 0.88–1.16)
LACTATE BLDV-MCNC: 2.7 MMOL/L — HIGH (ref 0.7–2)
LYMPHOCYTES # BLD AUTO: 3.02 K/UL — SIGNIFICANT CHANGE UP (ref 1–3.3)
LYMPHOCYTES # BLD AUTO: 31.3 % — SIGNIFICANT CHANGE UP (ref 13–44)
MCHC RBC-ENTMCNC: 28.2 PG — SIGNIFICANT CHANGE UP (ref 27–34)
MCHC RBC-ENTMCNC: 33.6 GM/DL — SIGNIFICANT CHANGE UP (ref 32–36)
MCV RBC AUTO: 83.9 FL — SIGNIFICANT CHANGE UP (ref 80–100)
MONOCYTES # BLD AUTO: 1.23 K/UL — HIGH (ref 0–0.9)
MONOCYTES NFR BLD AUTO: 12.7 % — SIGNIFICANT CHANGE UP (ref 2–14)
NEUTROPHILS # BLD AUTO: 4.56 K/UL — SIGNIFICANT CHANGE UP (ref 1.8–7.4)
NEUTROPHILS NFR BLD AUTO: 47.3 % — SIGNIFICANT CHANGE UP (ref 43–77)
NRBC # BLD: 0 /100 WBCS — SIGNIFICANT CHANGE UP (ref 0–0)
PCO2 BLDV: 37 MMHG — SIGNIFICANT CHANGE UP (ref 35–50)
PH BLDV: 7.4 — SIGNIFICANT CHANGE UP (ref 7.35–7.45)
PLATELET # BLD AUTO: 271 K/UL — SIGNIFICANT CHANGE UP (ref 150–400)
PO2 BLDV: 63 MMHG — HIGH (ref 25–45)
POTASSIUM BLDV-SCNC: 4 MMOL/L — SIGNIFICANT CHANGE UP (ref 3.5–5.3)
POTASSIUM SERPL-MCNC: 4.3 MMOL/L — SIGNIFICANT CHANGE UP (ref 3.5–5.3)
POTASSIUM SERPL-SCNC: 4.3 MMOL/L — SIGNIFICANT CHANGE UP (ref 3.5–5.3)
PROT SERPL-MCNC: 8 G/DL — SIGNIFICANT CHANGE UP (ref 6–8.3)
PROTHROM AB SERPL-ACNC: 12.9 SEC — SIGNIFICANT CHANGE UP (ref 10.6–13.6)
RBC # BLD: 5.42 M/UL — SIGNIFICANT CHANGE UP (ref 4.2–5.8)
RBC # FLD: 14.5 % — SIGNIFICANT CHANGE UP (ref 10.3–14.5)
SAO2 % BLDV: 93 % — HIGH (ref 67–88)
SODIUM SERPL-SCNC: 133 MMOL/L — LOW (ref 135–145)
WBC # BLD: 9.65 K/UL — SIGNIFICANT CHANGE UP (ref 3.8–10.5)
WBC # FLD AUTO: 9.65 K/UL — SIGNIFICANT CHANGE UP (ref 3.8–10.5)

## 2021-03-19 PROCEDURE — 93971 EXTREMITY STUDY: CPT | Mod: 26,RT

## 2021-03-19 PROCEDURE — 99285 EMERGENCY DEPT VISIT HI MDM: CPT

## 2021-03-19 PROCEDURE — 71045 X-RAY EXAM CHEST 1 VIEW: CPT | Mod: 26

## 2021-03-19 RX ORDER — SODIUM CHLORIDE 9 MG/ML
1000 INJECTION INTRAMUSCULAR; INTRAVENOUS; SUBCUTANEOUS ONCE
Refills: 0 | Status: COMPLETED | OUTPATIENT
Start: 2021-03-19 | End: 2021-03-19

## 2021-03-19 RX ORDER — CEFEPIME 1 G/1
2000 INJECTION, POWDER, FOR SOLUTION INTRAMUSCULAR; INTRAVENOUS ONCE
Refills: 0 | Status: COMPLETED | OUTPATIENT
Start: 2021-03-19 | End: 2021-03-19

## 2021-03-19 RX ORDER — VANCOMYCIN HCL 1 G
1000 VIAL (EA) INTRAVENOUS ONCE
Refills: 0 | Status: COMPLETED | OUTPATIENT
Start: 2021-03-19 | End: 2021-03-19

## 2021-03-19 NOTE — ED PROVIDER NOTE - CHIEF COMPLAINT
The patient is a 56y Male complaining of  The patient is a 56y Male complaining of redness and swelling near PICC site.

## 2021-03-19 NOTE — ED ADULT NURSE REASSESSMENT NOTE - NS ED NURSE REASSESS COMMENT FT1
QRN is completed. First blood culture is obtained from patient's Left forearm. Not from the PICC line. Patient states that the PICC line may not draw back.

## 2021-03-19 NOTE — ED PROVIDER NOTE - CLINICAL SUMMARY MEDICAL DECISION MAKING FREE TEXT BOX
56 male, Hx: DMT2, HLD, HTN, hearing loss, presents to the ED with redness/swelling/purulent discharge from his PICC line site. Exam, presentation, and history consistent with likely bacteremia from PICC line cellulitis vs. abscess vs. thrombophlebitis. Plan: CBC, CMP, EKG, BCx, CXR, UA, UCx, Vanc, Cefepime (residual abx dosage for 6 days for mastoiditis). Eval not consistent with meningitis, encephalitis, or sepsis. 56 male, Hx: DMT2, HLD, HTN, hearing loss, presents to the ED with redness/swelling/purulent discharge from his PICC line site. Exam, presentation, and history consistent with likely bacteremia from PICC line cellulitis vs. abscess vs. thrombophlebitis. Plan: CBC, CMP, EKG, BCx, CXR, UA, UCx, Vanc, Cefepime (residual abx dosage for 6 days for mastoiditis). Eval not consistent with meningitis, encephalitis, or sepsis.    JEROMY Barton MD: Agree with resident statement above. BCx sent from peripheral veins as well as from PICC. Will empirically start IV vancomycin via PICC line presuming it Is infected. Pt should be evaluted for PICC removal tomorrow as well as by his ID physicians. Plan: abx, labs, Doppler to r/o phlebitis, TBA

## 2021-03-19 NOTE — ED ADULT TRIAGE NOTE - CHIEF COMPLAINT QUOTE
pt c/o "redness/swelling to Right upper arm PICC line site x few days. PICC line placed about 5 weeks ago. No fevers" pt c/o "redness/swelling and drainage to Right upper arm PICC line site x few days. PICC line placed about 5 weeks ago. No fevers"

## 2021-03-19 NOTE — CHART NOTE - NSCHARTNOTEFT_GEN_A_CORE
called by Home care RN that PICC line site has a rash and has some oozing and swelling at the site  Pt afebrile but given above RN   advised to have  pt to goto ER-have PICC removed- assesses and blood Cx + possible doppler  May need Vanco Iv as well  Attempted to call pt-went to voicemail  Home care nurse Abida says she already advised pt of same called by Home care RN that PICC line site has a rash and has some oozing and swelling at the site  Pt afebrile but given above RN   advised to have  pt to goto ER-have PICC removed- assesses and blood Cx + possible doppler  May need Vanco Iv as well  Attempted to call pt-went to voicemail  Home care nurse Abida says she already advised pt of same    addendum 1700 hrs  Spoke to patient-he will goto ER

## 2021-03-19 NOTE — ED PROVIDER NOTE - CARE PLAN
Principal Discharge DX:	Infection of peripherally inserted central catheter (PICC), initial encounter

## 2021-03-19 NOTE — ED PROVIDER NOTE - RAPID ASSESSMENT
56y M with no relevant PMHx of presents to the ED c/o redness and yellow drainage around the R upper extremity pic line site x2-3 days. Pt had the pic line put in 5 weeks ago for at home antibiotics due to central perforation of the tympanic membrane of the ears. Denies fevers, chills, body aches.     I, Jolene Aguilar (Cinthia), have documented this rapid assessment note under the dictation of Rui Andrew PA-C, which has been reviewed and affirmed to be accurate.  Patient was seen as a QPA patient. The patient will be seen and further worked up in the main emergency department and their care will be completed by the main emergency department team along with a thorough physical exam. Receiving team will follow up on labs, analgesia, any clinical imaging, reassess and disposition as clinically indicated, all decisions regarding the progression of care will be made at their discretion.     Rui Andrew (PA-C)note: The janetibmariam (Jolene Aguilar)'s documentation has been prepared under my direction and personally reviewed by me. Pt to be sent to main ED for further evaluation - all orders placed to be followed by attending physician in main ED.* 56y M with PMHx of hearing loss, presents to the ED c/o redness and yellow drainage around the R upper extremity PICC line site x2-3 days. Pt had the PICC line put in 5 weeks ago for at home antibiotics due dx of central perforation of the tympanic membrane b/l. Denies fevers, chills, body aches.     I, Jolene Aguilar (Cinthia), have documented this rapid assessment note under the dictation of Rui Andrew PA-C, which has been reviewed and affirmed to be accurate.  Patient was seen as a QPA patient. The patient will be seen and further worked up in the main emergency department and their care will be completed by the main emergency department team along with a thorough physical exam. Receiving team will follow up on labs, analgesia, any clinical imaging, reassess and disposition as clinically indicated, all decisions regarding the progression of care will be made at their discretion.     Rui Andrew (PA-C)note: The janetibmariam (Jolene Aguilar)'s documentation has been prepared under my direction and personally reviewed by me. Pt to be sent to main ED for further evaluation - all orders placed to be followed by attending physician in main ED.* 56y M with PMHx of DMT2, HLD, HTN, hearing loss, presents to the ED c/o redness and yellow drainage around the R upper extremity PICC line site x2-3 days. Pt had the PICC line put in 5 weeks ago for at home antibiotics due dx of skull base OM 2/2 mastoiditis. Denies fevers, chills, body aches.     I, Jolene Aguilar (Cinthia), have documented this rapid assessment note under the dictation of Rui Andrew PA-C, which has been reviewed and affirmed to be accurate.  Patient was seen as a QPA patient. The patient will be seen and further worked up in the main emergency department and their care will be completed by the main emergency department team along with a thorough physical exam. Receiving team will follow up on labs, analgesia, any clinical imaging, reassess and disposition as clinically indicated, all decisions regarding the progression of care will be made at their discretion.     Rui Andrew (BARRON)note: The janetibmariam (Jolene Aguilar)'s documentation has been prepared under my direction and personally reviewed by me. Pt to be sent to main ED for further evaluation - all orders placed to be followed by attending physician in main ED.*

## 2021-03-19 NOTE — ED PROVIDER NOTE - PROGRESS NOTE DETAILS
Resident Mel: preliminary evaluation consistent likely bacteremia + cellulitis. S/P BCx at PICC site and peripherally. S/P Vanc and Cefepime. Case endorsed to Hospitalist (Heriberto Quintana). Stable for floor at present.

## 2021-03-19 NOTE — ED PROVIDER NOTE - OBJECTIVE STATEMENT
56 male, Hx: DMT2, HLD, HTN, hearing loss, presents to the ED with redness/swelling/purulent discharge from his PICC line site. Pt reports he has been receiving IV Antibiotics (Cefepime) for Left sided mastoiditis that was diagnosed on previous hospital admission (pt was endorsing left sided preauricular headache for 1 month - found to have L Mastoiditis, follows with ENT). Pt denies any fevers, chills, CP, SOB, abdominal pain, nausea, vomiting, diarrhea, constipation, bloody stools, dysuric symptoms. Denies any prior history of PE/DVT; denies any numbness/tingling in B/L UE.

## 2021-03-19 NOTE — ED PROVIDER NOTE - NS ED ROS FT
Constitution: No Fever or chills, No Weight Loss,   HEENT: (+) B/L Hearing Loss (Baseline for patient, follows with ENT); No cough, No Discharge, No Rhinorrhea, No URI symptoms  Cardio: No Chest pain, No Palpitations, No Dyspnea  Resp: No SOB, No Wheezing  GI: No abdominal pain, No Nausea, No Vomiting, No Constipation, No Diarrhea  : No burning upon urination, trouble urinating, no foul odor from urine  MSK: No Back pain, No Numbness, No Tingling, No Weakness  Neuro: No Headache, No changes to Vision, No changes to Hearing, Normal Gait  Skin: (+) RUE Picc Line with redness/swelling/pain;

## 2021-03-20 DIAGNOSIS — I10 ESSENTIAL (PRIMARY) HYPERTENSION: ICD-10-CM

## 2021-03-20 DIAGNOSIS — Z29.9 ENCOUNTER FOR PROPHYLACTIC MEASURES, UNSPECIFIED: ICD-10-CM

## 2021-03-20 DIAGNOSIS — E78.5 HYPERLIPIDEMIA, UNSPECIFIED: ICD-10-CM

## 2021-03-20 DIAGNOSIS — T80.219A UNSPECIFIED INFECTION DUE TO CENTRAL VENOUS CATHETER, INITIAL ENCOUNTER: ICD-10-CM

## 2021-03-20 DIAGNOSIS — E11.9 TYPE 2 DIABETES MELLITUS WITHOUT COMPLICATIONS: ICD-10-CM

## 2021-03-20 DIAGNOSIS — M86.9 OSTEOMYELITIS, UNSPECIFIED: ICD-10-CM

## 2021-03-20 LAB
ALBUMIN SERPL ELPH-MCNC: 3.9 G/DL — SIGNIFICANT CHANGE UP (ref 3.3–5)
ALP SERPL-CCNC: 72 U/L — SIGNIFICANT CHANGE UP (ref 40–120)
ALT FLD-CCNC: 37 U/L — SIGNIFICANT CHANGE UP (ref 10–45)
ANION GAP SERPL CALC-SCNC: 11 MMOL/L — SIGNIFICANT CHANGE UP (ref 5–17)
AST SERPL-CCNC: 22 U/L — SIGNIFICANT CHANGE UP (ref 10–40)
BASOPHILS # BLD AUTO: 0.06 K/UL — SIGNIFICANT CHANGE UP (ref 0–0.2)
BASOPHILS NFR BLD AUTO: 0.7 % — SIGNIFICANT CHANGE UP (ref 0–2)
BILIRUB SERPL-MCNC: 0.4 MG/DL — SIGNIFICANT CHANGE UP (ref 0.2–1.2)
BUN SERPL-MCNC: 10 MG/DL — SIGNIFICANT CHANGE UP (ref 7–23)
CALCIUM SERPL-MCNC: 8.8 MG/DL — SIGNIFICANT CHANGE UP (ref 8.4–10.5)
CHLORIDE SERPL-SCNC: 106 MMOL/L — SIGNIFICANT CHANGE UP (ref 96–108)
CO2 SERPL-SCNC: 20 MMOL/L — LOW (ref 22–31)
CREAT SERPL-MCNC: 0.54 MG/DL — SIGNIFICANT CHANGE UP (ref 0.5–1.3)
EOSINOPHIL # BLD AUTO: 0.78 K/UL — HIGH (ref 0–0.5)
EOSINOPHIL NFR BLD AUTO: 9.5 % — HIGH (ref 0–6)
GLUCOSE BLDC GLUCOMTR-MCNC: 145 MG/DL — HIGH (ref 70–99)
GLUCOSE BLDC GLUCOMTR-MCNC: 158 MG/DL — HIGH (ref 70–99)
GLUCOSE BLDC GLUCOMTR-MCNC: 160 MG/DL — HIGH (ref 70–99)
GLUCOSE BLDC GLUCOMTR-MCNC: 176 MG/DL — HIGH (ref 70–99)
GLUCOSE SERPL-MCNC: 148 MG/DL — HIGH (ref 70–99)
HCT VFR BLD CALC: 47.3 % — SIGNIFICANT CHANGE UP (ref 39–50)
HGB BLD-MCNC: 15.5 G/DL — SIGNIFICANT CHANGE UP (ref 13–17)
IMM GRANULOCYTES NFR BLD AUTO: 0.5 % — SIGNIFICANT CHANGE UP (ref 0–1.5)
LYMPHOCYTES # BLD AUTO: 2.52 K/UL — SIGNIFICANT CHANGE UP (ref 1–3.3)
LYMPHOCYTES # BLD AUTO: 30.8 % — SIGNIFICANT CHANGE UP (ref 13–44)
MAGNESIUM SERPL-MCNC: 2 MG/DL — SIGNIFICANT CHANGE UP (ref 1.6–2.6)
MCHC RBC-ENTMCNC: 27.7 PG — SIGNIFICANT CHANGE UP (ref 27–34)
MCHC RBC-ENTMCNC: 32.8 GM/DL — SIGNIFICANT CHANGE UP (ref 32–36)
MCV RBC AUTO: 84.6 FL — SIGNIFICANT CHANGE UP (ref 80–100)
MONOCYTES # BLD AUTO: 1.07 K/UL — HIGH (ref 0–0.9)
MONOCYTES NFR BLD AUTO: 13.1 % — SIGNIFICANT CHANGE UP (ref 2–14)
NEUTROPHILS # BLD AUTO: 3.7 K/UL — SIGNIFICANT CHANGE UP (ref 1.8–7.4)
NEUTROPHILS NFR BLD AUTO: 45.4 % — SIGNIFICANT CHANGE UP (ref 43–77)
NRBC # BLD: 0 /100 WBCS — SIGNIFICANT CHANGE UP (ref 0–0)
PHOSPHATE SERPL-MCNC: 3 MG/DL — SIGNIFICANT CHANGE UP (ref 2.5–4.5)
PLATELET # BLD AUTO: 268 K/UL — SIGNIFICANT CHANGE UP (ref 150–400)
POTASSIUM SERPL-MCNC: 4.3 MMOL/L — SIGNIFICANT CHANGE UP (ref 3.5–5.3)
POTASSIUM SERPL-SCNC: 4.3 MMOL/L — SIGNIFICANT CHANGE UP (ref 3.5–5.3)
PROT SERPL-MCNC: 7 G/DL — SIGNIFICANT CHANGE UP (ref 6–8.3)
RBC # BLD: 5.59 M/UL — SIGNIFICANT CHANGE UP (ref 4.2–5.8)
RBC # FLD: 14.6 % — HIGH (ref 10.3–14.5)
SARS-COV-2 RNA SPEC QL NAA+PROBE: SIGNIFICANT CHANGE UP
SODIUM SERPL-SCNC: 137 MMOL/L — SIGNIFICANT CHANGE UP (ref 135–145)
WBC # BLD: 8.17 K/UL — SIGNIFICANT CHANGE UP (ref 3.8–10.5)
WBC # FLD AUTO: 8.17 K/UL — SIGNIFICANT CHANGE UP (ref 3.8–10.5)

## 2021-03-20 PROCEDURE — 99223 1ST HOSP IP/OBS HIGH 75: CPT

## 2021-03-20 RX ORDER — VANCOMYCIN HCL 1 G
1250 VIAL (EA) INTRAVENOUS EVERY 12 HOURS
Refills: 0 | Status: DISCONTINUED | OUTPATIENT
Start: 2021-03-20 | End: 2021-03-21

## 2021-03-20 RX ORDER — DEXTROSE 50 % IN WATER 50 %
15 SYRINGE (ML) INTRAVENOUS ONCE
Refills: 0 | Status: DISCONTINUED | OUTPATIENT
Start: 2021-03-20 | End: 2021-03-22

## 2021-03-20 RX ORDER — SIMVASTATIN 20 MG/1
20 TABLET, FILM COATED ORAL AT BEDTIME
Refills: 0 | Status: DISCONTINUED | OUTPATIENT
Start: 2021-03-20 | End: 2021-03-22

## 2021-03-20 RX ORDER — GLUCAGON INJECTION, SOLUTION 0.5 MG/.1ML
1 INJECTION, SOLUTION SUBCUTANEOUS ONCE
Refills: 0 | Status: DISCONTINUED | OUTPATIENT
Start: 2021-03-20 | End: 2021-03-22

## 2021-03-20 RX ORDER — DEXTROSE 50 % IN WATER 50 %
12.5 SYRINGE (ML) INTRAVENOUS ONCE
Refills: 0 | Status: DISCONTINUED | OUTPATIENT
Start: 2021-03-20 | End: 2021-03-22

## 2021-03-20 RX ORDER — DEXTROSE 50 % IN WATER 50 %
25 SYRINGE (ML) INTRAVENOUS ONCE
Refills: 0 | Status: DISCONTINUED | OUTPATIENT
Start: 2021-03-20 | End: 2021-03-22

## 2021-03-20 RX ORDER — CEFEPIME 1 G/1
2000 INJECTION, POWDER, FOR SOLUTION INTRAMUSCULAR; INTRAVENOUS EVERY 8 HOURS
Refills: 0 | Status: DISCONTINUED | OUTPATIENT
Start: 2021-03-20 | End: 2021-03-22

## 2021-03-20 RX ORDER — ENOXAPARIN SODIUM 100 MG/ML
40 INJECTION SUBCUTANEOUS DAILY
Refills: 0 | Status: DISCONTINUED | OUTPATIENT
Start: 2021-03-20 | End: 2021-03-22

## 2021-03-20 RX ORDER — INSULIN LISPRO 100/ML
VIAL (ML) SUBCUTANEOUS
Refills: 0 | Status: DISCONTINUED | OUTPATIENT
Start: 2021-03-20 | End: 2021-03-22

## 2021-03-20 RX ORDER — PETROLATUM,WHITE
1 JELLY (GRAM) TOPICAL DAILY
Refills: 0 | Status: DISCONTINUED | OUTPATIENT
Start: 2021-03-20 | End: 2021-03-22

## 2021-03-20 RX ORDER — LISINOPRIL 2.5 MG/1
5 TABLET ORAL DAILY
Refills: 0 | Status: DISCONTINUED | OUTPATIENT
Start: 2021-03-20 | End: 2021-03-22

## 2021-03-20 RX ORDER — INFLUENZA VIRUS VACCINE 15; 15; 15; 15 UG/.5ML; UG/.5ML; UG/.5ML; UG/.5ML
0.5 SUSPENSION INTRAMUSCULAR ONCE
Refills: 0 | Status: COMPLETED | OUTPATIENT
Start: 2021-03-20 | End: 2021-03-20

## 2021-03-20 RX ORDER — INSULIN LISPRO 100/ML
VIAL (ML) SUBCUTANEOUS AT BEDTIME
Refills: 0 | Status: DISCONTINUED | OUTPATIENT
Start: 2021-03-20 | End: 2021-03-22

## 2021-03-20 RX ORDER — SODIUM CHLORIDE 9 MG/ML
1000 INJECTION, SOLUTION INTRAVENOUS
Refills: 0 | Status: DISCONTINUED | OUTPATIENT
Start: 2021-03-20 | End: 2021-03-22

## 2021-03-20 RX ORDER — METFORMIN HYDROCHLORIDE 850 MG/1
1 TABLET ORAL
Qty: 0 | Refills: 0 | DISCHARGE

## 2021-03-20 RX ADMIN — SIMVASTATIN 20 MILLIGRAM(S): 20 TABLET, FILM COATED ORAL at 21:49

## 2021-03-20 RX ADMIN — LISINOPRIL 5 MILLIGRAM(S): 2.5 TABLET ORAL at 09:19

## 2021-03-20 RX ADMIN — Medication 2: at 09:19

## 2021-03-20 RX ADMIN — CEFEPIME 100 MILLIGRAM(S): 1 INJECTION, POWDER, FOR SOLUTION INTRAMUSCULAR; INTRAVENOUS at 00:00

## 2021-03-20 RX ADMIN — ENOXAPARIN SODIUM 40 MILLIGRAM(S): 100 INJECTION SUBCUTANEOUS at 13:49

## 2021-03-20 RX ADMIN — Medication 1 APPLICATION(S): at 23:00

## 2021-03-20 RX ADMIN — CEFEPIME 100 MILLIGRAM(S): 1 INJECTION, POWDER, FOR SOLUTION INTRAMUSCULAR; INTRAVENOUS at 17:34

## 2021-03-20 RX ADMIN — CEFEPIME 100 MILLIGRAM(S): 1 INJECTION, POWDER, FOR SOLUTION INTRAMUSCULAR; INTRAVENOUS at 09:18

## 2021-03-20 RX ADMIN — CEFEPIME 100 MILLIGRAM(S): 1 INJECTION, POWDER, FOR SOLUTION INTRAMUSCULAR; INTRAVENOUS at 21:49

## 2021-03-20 RX ADMIN — Medication 166.67 MILLIGRAM(S): at 15:20

## 2021-03-20 RX ADMIN — Medication 2: at 17:33

## 2021-03-20 RX ADMIN — Medication 2: at 13:52

## 2021-03-20 RX ADMIN — SODIUM CHLORIDE 1000 MILLILITER(S): 9 INJECTION INTRAMUSCULAR; INTRAVENOUS; SUBCUTANEOUS at 00:00

## 2021-03-20 RX ADMIN — Medication 250 MILLIGRAM(S): at 02:29

## 2021-03-20 NOTE — CHART NOTE - NSCHARTNOTEFT_GEN_A_CORE
Informed by Infectious diseases, Dr. Haji , that PICC line should be removed as pt has been  developing  redness, swelling, pain, and discharge from his PICC line. Discussed recommendation with Dr. Quintana and agreed. PICC line was removed at bedside. Will continue with current IV medications as per ID.   Dany NP   [phone]

## 2021-03-20 NOTE — CONSULT NOTE ADULT - ATTENDING COMMENTS
Hopefully this is just a soft tissue infection and not something like a high grade MRSA bacteremia.   Repeat duplex study now that the PICC is out to see if there's a clot.   His ENT infection seems like it has clinically resolved.   Eventually might be able to send home with just a peripheral IV to finish course.     Truong Zhong MD   Infectious Disease   Pager [phone]   After 5PM and on weekends please page fellow on call or call [phone]

## 2021-03-20 NOTE — H&P ADULT - ATTENDING COMMENTS
Patient assigned to me by night hospitalist in charge for management and care for patient for this evening only. Care to be resumed by day hospitalist (Dr. Quintana) in the morning and thereafter.     Patient's care rendered on 3/20/21 at 2AM.      Plan discussed with Patient, RN

## 2021-03-20 NOTE — ED ADULT NURSE NOTE - CHIEF COMPLAINT QUOTE
pt c/o "redness/swelling and drainage to Right upper arm PICC line site x few days. PICC line placed about 5 weeks ago. No fevers"

## 2021-03-20 NOTE — H&P ADULT - NSHPPHYSICALEXAM_GEN_ALL_CORE
PHYSICAL EXAM:  Vital Signs Last 24 Hrs  T(C): 36.5 (03-20-21 @ 02:48)  T(F): 97.7 (03-20-21 @ 02:48), Max: 97.9 (03-19-21 @ 19:48)  HR: 75 (03-20-21 @ 02:48) (75 - 106)  BP: 128/77 (03-20-21 @ 02:48)  BP(mean): --  RR: 18 (03-20-21 @ 02:48) (16 - 18)  SpO2: 100% (03-20-21 @ 02:48) (100% - 100%)  Wt(kg): --    Constitutional: NAD, awake and alert  EYES: EOMI  ENT:  normal hearing; left ear redness but no tenderness  Neck: Soft and supple, No JVD  Lungs: Breath sounds are clear bilaterally, No wheezing, rales or rhonchi  Heart: S1 and S2, regular rate and rhythm, no Murmurs, gallops or rubs  Abdomen: Bowel Sounds present, soft, nontender, nondistended, no guarding, no rebound  Extremities: right arm PICC line present, ?purulence on bandage, +redness around the site, +warmth  Vascular: 2+ peripheral pulses lower ex  Neurological: A/O x 3, no focal deficits  Musculoskeletal: 5/5 strength b/l upper and lower extremities  Skin: No rashes  Psych: no depression or anhedonia  HEME: no bruises, no nose bleeds

## 2021-03-20 NOTE — H&P ADULT - PROBLEM SELECTOR PLAN 1
Patient with possible PICC line infection, with having purulent discharge, +erythema, +warmth  Cannot r/o blood clot  IR consult for possible exchange  f/u blood culture  s/p IV vanc and cefepime, which will continue for now

## 2021-03-20 NOTE — H&P ADULT - NSHPREVIEWOFSYSTEMS_GEN_ALL_CORE
CONSTITUTIONAL: No weakness, fevers or chills  EYES/ENT: No visual changes;  No dysphagia  NECK: No pain or stiffness  RESPIRATORY: No cough, wheezing, hemoptysis; No shortness of breath  CARDIOVASCULAR: No chest pain or palpitations; No lower extremity edema  EXTREMITIES: Right arm PICC line in place  MUSCULOSKELETAL: no joint pain, swelling  GASTROINTESTINAL: No abdominal or epigastric pain. No nausea, vomiting, or hematemesis; No diarrhea or constipation. No melena or hematochezia.  BACK: No back pain  GENITOURINARY: No dysuria, frequency or hematuria  NEUROLOGICAL: No numbness or weakness  SKIN: No itching, burning, rashes, or lesions   PSYCH: no agitation  All other review of systems is negative unless indicated above.

## 2021-03-20 NOTE — H&P ADULT - NSICDXPASTMEDICALHX_GEN_ALL_CORE_FT
PAST MEDICAL HISTORY:  COVID-19     H/O mastoiditis     HLD (hyperlipidemia)     HTN (hypertension)     Osteomyelitis     T2DM (type 2 diabetes mellitus)

## 2021-03-20 NOTE — H&P ADULT - HISTORY OF PRESENT ILLNESS
54 yo male with PMH T2DM, HTN, HLD, COVID (Oct 2020), presents here with possible PICC line infection.  Patient discharged on February 11th, 2021 after being admitted for mastoiditis complicated by infiltrative lesion seen on MRI concerning for possible skull base osteomyelitis.  He did not have a tissue diagnosis.  He was discharged with IV cefepime.  Patient states he has been taking abx and PICC line has been flushing well, though no blood draws.  For last 2-3 days he noticed the site started becoming red and then started noticing purulent drainage.  Denies any pain, numbness or tingling.  Denies any fever, but had some chills today. Denies nausea, vomiting, cough, SOB.  On arrival to ED, patient's vitals showed Temp 97.9, , /83, saturation 100%.

## 2021-03-20 NOTE — H&P ADULT - ASSESSMENT
56 yo male with PMH T2DM, HTN, HLD, COVID (Oct 2020), presents here with possible PICC line infection.

## 2021-03-20 NOTE — H&P ADULT - NSHPLABSRESULTS_GEN_ALL_CORE
Labs personally reviewed:                          15.3   9.65  )-----------( 271      ( 19 Mar 2021 20:31 )             45.5     03-19    133<L>  |  98  |  15  ----------------------------<  294<H>  4.3   |  20<L>  |  0.59    Ca    9.6      19 Mar 2021 20:31    TPro  8.0  /  Alb  4.4  /  TBili  0.4  /  DBili  x   /  AST  21  /  ALT  41  /  AlkPhos  86  03-19        LIVER FUNCTIONS - ( 19 Mar 2021 20:31 )  Alb: 4.4 g/dL / Pro: 8.0 g/dL / ALK PHOS: 86 U/L / ALT: 41 U/L / AST: 21 U/L / GGT: x           PT/INR - ( 19 Mar 2021 20:31 )   PT: 12.9 sec;   INR: 1.08 ratio         PTT - ( 19 Mar 2021 20:31 )  PTT:39.3 sec    CAPILLARY BLOOD GLUCOSE          Imaging:  CXR personally reviewed: Right upper extremity PICC tip is in the SVC..  VA duplex  IMPRESSION:  Partial compression of the mid brachial and proximal basilic veins likely secondary to limited compression due to the PICC line; however, thrombus adjacent to the PICC line cannot be entirely excluded.    EKG personally reviewed: nsr, no acute st changes

## 2021-03-20 NOTE — CONSULT NOTE ADULT - SUBJECTIVE AND OBJECTIVE BOX
INFECTIOUS DISEASE SERVICE INITIAL CONSULTATION NOTE    HPI:  56 yo male with PMH T2DM, HTN, HLD, COVID (Oct 2020), presents here with possible PICC line infection.  Patient discharged on February 11th, 2021 after being admitted for mastoiditis complicated by infiltrative lesion seen on MRI concerning for possible skull base osteomyelitis.  He did not have a tissue diagnosis.  He was discharged with IV cefepime.  Patient states he has been taking abx and PICC line has been flushing well, though no blood draws.  For last 2-3 days he noticed the site started becoming red and then started noticing purulent drainage.  Denies any pain, numbness or tingling.  Denies any fever, but had some chills today. Denies nausea, vomiting, cough, SOB.  On arrival to ED, patient's vitals showed Temp 97.9, , /83, saturation 100%.     (20 Mar 2021 03:09)  ID consulted for infected PICC line.   The patient reports that his PICC line is hurting, has been red and swollen for a couple days now. No fevers, chills, chest pain, cough, nausea, vomiting, abdominal pain, diarrhea, dysuria, or rash.   His last day of cefepime is 3/29.     PAST MEDICAL & SURGICAL HISTORY:  Osteomyelitis    H/O mastoiditis    COVID-19    T2DM (type 2 diabetes mellitus)    HLD (hyperlipidemia)    HTN (hypertension)    No significant past surgical history        REVIEW OF SYSTEMS:  Constitutional: no weakness, no fevers, no chills  Dermatologic: no rash  Respiratory: no SOB, no cough  Cardiovascular: no chest pain, no palpitations  Gastrointestinal: no nausea, no vomiting, no diarrhea  Genitourinary: no dysuria, no urinary frequency, no hematuria, no urinary retention  Musculoskeletal:	no weakness, no joint swelling/pain  Neurological: no focal weakness or numbness  Endocrine: no polyuria, no polydipsia    ACTIVE ANTIMICROBIAL/ANTIBIOTIC MEDICATIONS:  cefepime   IVPB 2000 milliGRAM(s) IV Intermittent every 8 hours  vancomycin  IVPB 1250 milliGRAM(s) IV Intermittent every 12 hours      OTHER MEDICATIONS:  dextrose 40% Gel 15 Gram(s) Oral once  dextrose 5%. 1000 milliLiter(s) IV Continuous <Continuous>  dextrose 5%. 1000 milliLiter(s) IV Continuous <Continuous>  dextrose 50% Injectable 25 Gram(s) IV Push once  dextrose 50% Injectable 12.5 Gram(s) IV Push once  dextrose 50% Injectable 25 Gram(s) IV Push once  enoxaparin Injectable 40 milliGRAM(s) SubCutaneous daily  glucagon  Injectable 1 milliGRAM(s) IntraMuscular once  influenza   Vaccine 0.5 milliLiter(s) IntraMuscular once  insulin lispro (ADMELOG) corrective regimen sliding scale   SubCutaneous three times a day before meals  insulin lispro (ADMELOG) corrective regimen sliding scale   SubCutaneous at bedtime  lisinopril 5 milliGRAM(s) Oral daily  simvastatin 20 milliGRAM(s) Oral at bedtime      ALLERGIES:  Allergies    No Known Allergies    Intolerances        SOCIAL HISTORY:  Lives with wife. Works as , but not working since he is on antibiotics. From [facility], immigrated [place].     FAMILY HISTORY: No recent febrile illnesses in family members      VITAL SIGNS:  ICU Vital Signs Last 24 Hrs  T(C): 36.6 (20 Mar 2021 07:40), Max: 36.7 (20 Mar 2021 05:18)  T(F): 97.9 (20 Mar 2021 07:40), Max: 98.1 (20 Mar 2021 05:18)  HR: 76 (20 Mar 2021 07:40) (60 - 106)  BP: 152/73 (20 Mar 2021 07:40) (110/68 - 152/73)  BP(mean): --  ABP: --  ABP(mean): --  RR: 18 (20 Mar 2021 07:40) (16 - 18)  SpO2: 99% (20 Mar 2021 07:40) (98% - 100%)      PHYSICAL EXAM:  Constitutional: Well appearing man lying on stretcher comfortably   Head: NC/AT  Eyes: anicteric slcera  ENMT: no rhinorrhea;  no oropharyngeal lesions, erythema, or exudates	  Neck: supple; no JVD or LAD  Respiratory: CTA B/L  Cardiovascular: +S1/S2, RRR; no appreciable murmurs  Gastrointestinal: soft, NT/ND; +BSx4, no HSM  Extremities: WWP; no clubbing, cyanosis  Vascular: RUE PICC line in place with dried discharge on dressing, surrounding erythema, warmth, and swelling, +TTP  Dermatologic: skin warm and dry; no visible rashes or lesions  Neurologic: AAOx3; no focal deficits    LABS:                        15.5   8.17  )-----------( 268      ( 20 Mar 2021 08:20 )             47.3     03-20    137  |  106  |  10  ----------------------------<  148<H>  4.3   |  20<L>  |  0.54    Ca    8.8      20 Mar 2021 06:50  Phos  3.0     03-20  Mg     2.0     03-20    TPro  7.0  /  Alb  3.9  /  TBili  0.4  /  DBili  x   /  AST  22  /  ALT  37  /  AlkPhos  72  03-20    PT/INR - ( 19 Mar 2021 20:31 )   PT: 12.9 sec;   INR: 1.08 ratio         PTT - ( 19 Mar 2021 20:31 )  PTT:39.3 sec      MICROBIOLOGY:  Blood cultures pending  COVID-19 PCR . (03.20.21 @ 00:49)   COVID-19 PCR: NotDetec    RADIOLOGY & ADDITIONAL STUDIES: INFECTIOUS DISEASE SERVICE INITIAL CONSULTATION NOTE    HPI:  54 yo male with PMH T2DM, HTN, HLD, COVID (Oct 2020), presents here with possible PICC line infection.  Patient discharged on February 11th, 2021 after being admitted for mastoiditis complicated by infiltrative lesion seen on MRI concerning for possible skull base osteomyelitis.  He did not have a tissue diagnosis.  He was discharged with IV cefepime.  Patient states he has been taking abx and PICC line has been flushing well, though no blood draws.  For last 2-3 days he noticed the site started becoming red and then started noticing purulent drainage.  Denies any pain, numbness or tingling.  Denies any fever, but had some chills today. Denies nausea, vomiting, cough, SOB.  On arrival to ED, patient's vitals showed Temp 97.9, , /83, saturation 100%.     (20 Mar 2021 03:09)  ID consulted for infected PICC line.   The patient reports that his PICC line is hurting, has been red and swollen for a couple days now. No fevers, chills, chest pain, cough, nausea, vomiting, abdominal pain, diarrhea, dysuria, or rash.   His last day of cefepime is 3/29.     PAST MEDICAL & SURGICAL HISTORY:  Osteomyelitis    H/O mastoiditis    COVID-19    T2DM (type 2 diabetes mellitus)    HLD (hyperlipidemia)    HTN (hypertension)    No significant past surgical history        REVIEW OF SYSTEMS:  Constitutional: no weakness, no fevers, no chills  Dermatologic: no rash  Respiratory: no SOB, no cough  Cardiovascular: no chest pain, no palpitations  Gastrointestinal: no nausea, no vomiting, no diarrhea  Genitourinary: no dysuria, no urinary frequency, no hematuria, no urinary retention  Musculoskeletal:	no weakness, no joint swelling/pain  Neurological: no focal weakness or numbness  Endocrine: no polyuria, no polydipsia    ACTIVE ANTIMICROBIAL/ANTIBIOTIC MEDICATIONS:  cefepime   IVPB 2000 milliGRAM(s) IV Intermittent every 8 hours  vancomycin  IVPB 1250 milliGRAM(s) IV Intermittent every 12 hours      OTHER MEDICATIONS:  dextrose 40% Gel 15 Gram(s) Oral once  dextrose 5%. 1000 milliLiter(s) IV Continuous <Continuous>  dextrose 5%. 1000 milliLiter(s) IV Continuous <Continuous>  dextrose 50% Injectable 25 Gram(s) IV Push once  dextrose 50% Injectable 12.5 Gram(s) IV Push once  dextrose 50% Injectable 25 Gram(s) IV Push once  enoxaparin Injectable 40 milliGRAM(s) SubCutaneous daily  glucagon  Injectable 1 milliGRAM(s) IntraMuscular once  influenza   Vaccine 0.5 milliLiter(s) IntraMuscular once  insulin lispro (ADMELOG) corrective regimen sliding scale   SubCutaneous three times a day before meals  insulin lispro (ADMELOG) corrective regimen sliding scale   SubCutaneous at bedtime  lisinopril 5 milliGRAM(s) Oral daily  simvastatin 20 milliGRAM(s) Oral at bedtime      ALLERGIES:  Allergies    No Known Allergies    Intolerances        SOCIAL HISTORY:  Lives with wife. Works as , but not working since he is on antibiotics. From [facility], immigrated [place].     FAMILY HISTORY: No recent febrile illnesses in family members      VITAL SIGNS:  ICU Vital Signs Last 24 Hrs  T(C): 36.6 (20 Mar 2021 07:40), Max: 36.7 (20 Mar 2021 05:18)  T(F): 97.9 (20 Mar 2021 07:40), Max: 98.1 (20 Mar 2021 05:18)  HR: 76 (20 Mar 2021 07:40) (60 - 106)  BP: 152/73 (20 Mar 2021 07:40) (110/68 - 152/73)  BP(mean): --  ABP: --  ABP(mean): --  RR: 18 (20 Mar 2021 07:40) (16 - 18)  SpO2: 99% (20 Mar 2021 07:40) (98% - 100%)      PHYSICAL EXAM:  Constitutional: Well appearing man lying on stretcher comfortably   Head: NC/AT  Eyes: anicteric slcera  ENMT: no rhinorrhea;  no oropharyngeal lesions, erythema, or exudates	  Neck: supple; no JVD or LAD  Respiratory: CTA B/L  Cardiovascular: +S1/S2, RRR; no appreciable murmurs  Gastrointestinal: soft, NT/ND; +BSx4, no HSM  Extremities: WWP; no clubbing, cyanosis  Vascular: RUE PICC line in place with dried discharge on dressing, surrounding erythema, warmth, and swelling, +TTP  Dermatologic: skin warm and dry; no visible rashes or lesions  Neurologic: AAOx3; no focal deficits    LABS:                        15.5   8.17  )-----------( 268      ( 20 Mar 2021 08:20 )             47.3     03-20    137  |  106  |  10  ----------------------------<  148<H>  4.3   |  20<L>  |  0.54    Ca    8.8      20 Mar 2021 06:50  Phos  3.0     03-20  Mg     2.0     03-20    TPro  7.0  /  Alb  3.9  /  TBili  0.4  /  DBili  x   /  AST  22  /  ALT  37  /  AlkPhos  72  03-20    PT/INR - ( 19 Mar 2021 20:31 )   PT: 12.9 sec;   INR: 1.08 ratio         PTT - ( 19 Mar 2021 20:31 )  PTT:39.3 sec      MICROBIOLOGY:  Blood cultures pending  COVID-19 PCR . (03.20.21 @ 00:49)   [facility]    RADIOLOGY & ADDITIONAL STUDIES:  VA Duplex Upper Ext Vein Scan, Right (03.19.21 @ 22:57)   Partial compression of the mid brachial and proximal basilic veins likely secondary to limited compression due to the PICC line; however, thrombus adjacent to the PICC line cannot be entirely excluded.

## 2021-03-20 NOTE — H&P ADULT - PROBLEM SELECTOR PLAN 2
osteomyelitis of skull base, planned for IV cefepime until 3/29/21  will continue same   ID f/u  ?completed ofloxacin ear drop

## 2021-03-20 NOTE — CONSULT NOTE ADULT - ASSESSMENT
Mr Nesbitt is a 55 year old man with DM, HTN, and HLD who is currently receiving cefepime via RUE PICC line at home for osteomyelitis of the skull. He developed redness, swelling, pain, and discharge from his PICC line, suggestive of PICC line infection.   Overall well appearing, no leukocytosis or fevers, but clear skin and soft tissue infection around his PICC line.     PICC line infection  - REMOVE THE PICC LINE ASAP  - Continue vancomycin 1250mg q 12 hours  - Check vancomycin trough before 4th dose  - Follow up blood cultures  - Monitor for fevers  - Trend WBCs    Osteomyelitis of the skull  - Continue cefepime 2g q 8 hours    Poppy Haji, PGY-5  Infectious Disease Fellow   Pager: [phone]  After 5pm/weekends: [phone]

## 2021-03-20 NOTE — ED ADULT NURSE NOTE - OBJECTIVE STATEMENT
Pt is a 56y M PMH DM2, HLD, HTN p/w erythema and yellow drainage around RUE PICC line x3 days. Pt had PICC placed for outpatient abx ~5 weeks ago for skull base OM 2/2 mastoiditis. Pt reports some itching, tenderness, and redness to site. PICC line does not pull back, flushes sluggishly. Denies fevers, chills, N/V/D, abdominal pain, SOB, chest pain. A&Ox4, BRAMBILA, lungs clear, distal pulses intact, abdomen soft nontender, skin intact. Side rails up for safety, call bell and personal items within reach, instructed to call for assistance, verbalizes understanding. Will continue to monitor.

## 2021-03-21 LAB
ALBUMIN SERPL ELPH-MCNC: 3.9 G/DL — SIGNIFICANT CHANGE UP (ref 3.3–5)
ALP SERPL-CCNC: 76 U/L — SIGNIFICANT CHANGE UP (ref 40–120)
ALT FLD-CCNC: 49 U/L — HIGH (ref 10–45)
ANION GAP SERPL CALC-SCNC: 10 MMOL/L — SIGNIFICANT CHANGE UP (ref 5–17)
AST SERPL-CCNC: 28 U/L — SIGNIFICANT CHANGE UP (ref 10–40)
BILIRUB SERPL-MCNC: 0.4 MG/DL — SIGNIFICANT CHANGE UP (ref 0.2–1.2)
BUN SERPL-MCNC: 16 MG/DL — SIGNIFICANT CHANGE UP (ref 7–23)
CALCIUM SERPL-MCNC: 8.8 MG/DL — SIGNIFICANT CHANGE UP (ref 8.4–10.5)
CHLORIDE SERPL-SCNC: 102 MMOL/L — SIGNIFICANT CHANGE UP (ref 96–108)
CO2 SERPL-SCNC: 22 MMOL/L — SIGNIFICANT CHANGE UP (ref 22–31)
CREAT SERPL-MCNC: 0.69 MG/DL — SIGNIFICANT CHANGE UP (ref 0.5–1.3)
GLUCOSE BLDC GLUCOMTR-MCNC: 153 MG/DL — HIGH (ref 70–99)
GLUCOSE BLDC GLUCOMTR-MCNC: 160 MG/DL — HIGH (ref 70–99)
GLUCOSE BLDC GLUCOMTR-MCNC: 170 MG/DL — HIGH (ref 70–99)
GLUCOSE BLDC GLUCOMTR-MCNC: 192 MG/DL — HIGH (ref 70–99)
GLUCOSE SERPL-MCNC: 205 MG/DL — HIGH (ref 70–99)
HCT VFR BLD CALC: 45.1 % — SIGNIFICANT CHANGE UP (ref 39–50)
HGB BLD-MCNC: 14.8 G/DL — SIGNIFICANT CHANGE UP (ref 13–17)
MCHC RBC-ENTMCNC: 28 PG — SIGNIFICANT CHANGE UP (ref 27–34)
MCHC RBC-ENTMCNC: 32.8 GM/DL — SIGNIFICANT CHANGE UP (ref 32–36)
MCV RBC AUTO: 85.3 FL — SIGNIFICANT CHANGE UP (ref 80–100)
NRBC # BLD: 0 /100 WBCS — SIGNIFICANT CHANGE UP (ref 0–0)
PLATELET # BLD AUTO: 232 K/UL — SIGNIFICANT CHANGE UP (ref 150–400)
POTASSIUM SERPL-MCNC: 4 MMOL/L — SIGNIFICANT CHANGE UP (ref 3.5–5.3)
POTASSIUM SERPL-SCNC: 4 MMOL/L — SIGNIFICANT CHANGE UP (ref 3.5–5.3)
PROT SERPL-MCNC: 7.3 G/DL — SIGNIFICANT CHANGE UP (ref 6–8.3)
RBC # BLD: 5.29 M/UL — SIGNIFICANT CHANGE UP (ref 4.2–5.8)
RBC # FLD: 14.6 % — HIGH (ref 10.3–14.5)
SODIUM SERPL-SCNC: 134 MMOL/L — LOW (ref 135–145)
VANCOMYCIN TROUGH SERPL-MCNC: 7.4 UG/ML — LOW (ref 10–20)
WBC # BLD: 8.02 K/UL — SIGNIFICANT CHANGE UP (ref 3.8–10.5)
WBC # FLD AUTO: 8.02 K/UL — SIGNIFICANT CHANGE UP (ref 3.8–10.5)

## 2021-03-21 PROCEDURE — 99232 SBSQ HOSP IP/OBS MODERATE 35: CPT

## 2021-03-21 RX ORDER — VANCOMYCIN HCL 1 G
1500 VIAL (EA) INTRAVENOUS EVERY 12 HOURS
Refills: 0 | Status: DISCONTINUED | OUTPATIENT
Start: 2021-03-21 | End: 2021-03-22

## 2021-03-21 RX ADMIN — Medication 2: at 09:10

## 2021-03-21 RX ADMIN — CEFEPIME 100 MILLIGRAM(S): 1 INJECTION, POWDER, FOR SOLUTION INTRAMUSCULAR; INTRAVENOUS at 21:52

## 2021-03-21 RX ADMIN — Medication 2: at 12:47

## 2021-03-21 RX ADMIN — Medication 2: at 17:37

## 2021-03-21 RX ADMIN — SIMVASTATIN 20 MILLIGRAM(S): 20 TABLET, FILM COATED ORAL at 21:52

## 2021-03-21 RX ADMIN — ENOXAPARIN SODIUM 40 MILLIGRAM(S): 100 INJECTION SUBCUTANEOUS at 12:48

## 2021-03-21 RX ADMIN — CEFEPIME 100 MILLIGRAM(S): 1 INJECTION, POWDER, FOR SOLUTION INTRAMUSCULAR; INTRAVENOUS at 14:50

## 2021-03-21 RX ADMIN — Medication 166.67 MILLIGRAM(S): at 18:32

## 2021-03-21 RX ADMIN — Medication 166.67 MILLIGRAM(S): at 06:21

## 2021-03-21 RX ADMIN — LISINOPRIL 5 MILLIGRAM(S): 2.5 TABLET ORAL at 05:13

## 2021-03-21 RX ADMIN — CEFEPIME 100 MILLIGRAM(S): 1 INJECTION, POWDER, FOR SOLUTION INTRAMUSCULAR; INTRAVENOUS at 05:15

## 2021-03-21 RX ADMIN — Medication 1 APPLICATION(S): at 12:47

## 2021-03-21 NOTE — PROGRESS NOTE ADULT - SUBJECTIVE AND OBJECTIVE BOX
INFECTIOUS DISEASE PROGRESS NOTE  Being followed by ID for PICC line infection  Subjective: Patient feels like his arm is getting better, no fevers, no new complaints.     VITALS  Vital Signs Last 24 Hrs  T(C): 37.2 (21 Mar 2021 07:53), Max: 37.2 (21 Mar 2021 07:53)  T(F): 98.9 (21 Mar 2021 07:53), Max: 98.9 (21 Mar 2021 07:53)  HR: 69 (21 Mar 2021 07:53) (69 - 78)  BP: 116/71 (21 Mar 2021 07:53) (116/71 - 145/88)  BP(mean): --  RR: 18 (21 Mar 2021 07:53) (18 - 19)  SpO2: 98% (21 Mar 2021 07:53) (97% - 100%)    I&O's Summary    20 Mar 2021 07:01  -  21 Mar 2021 07:00  --------------------------------------------------------  IN: 1060 mL / OUT: 0 mL / NET: 1060 mL        CAPILLARY BLOOD GLUCOSE  POCT Blood Glucose.: 192 mg/dL (21 Mar 2021 08:48)  POCT Blood Glucose.: 145 mg/dL (20 Mar 2021 21:13)  POCT Blood Glucose.: 158 mg/dL (20 Mar 2021 17:20)  POCT Blood Glucose.: 160 mg/dL (20 Mar 2021 13:42)      PHYSICAL EXAM  Constitutional: Well appearing man sitting comfortably   Neck: supple; no JVD or LAD  Respiratory: CTA B/L  Cardiovascular: +S1/S2, RRR; no appreciable murmurs  Gastrointestinal: soft, NT/ND; +BSx4, no HSM  Extremities: WWP; no clubbing, cyanosis  Vascular: Former RUE PICC line site with crusting, surrounding erythema, warmth, and swelling, +TTP  Dermatologic: skin warm and dry; no visible rashes or lesions  Neurologic: AAOx3; no focal deficits    MEDICATIONS  (STANDING):  AQUAPHOR (petrolatum Ointment) 1 Application(s) Topical daily  cefepime   IVPB 2000 milliGRAM(s) IV Intermittent every 8 hours  dextrose 40% Gel 15 Gram(s) Oral once  dextrose 5%. 1000 milliLiter(s) (50 mL/Hr) IV Continuous <Continuous>  dextrose 5%. 1000 milliLiter(s) (100 mL/Hr) IV Continuous <Continuous>  dextrose 50% Injectable 25 Gram(s) IV Push once  dextrose 50% Injectable 12.5 Gram(s) IV Push once  dextrose 50% Injectable 25 Gram(s) IV Push once  enoxaparin Injectable 40 milliGRAM(s) SubCutaneous daily  glucagon  Injectable 1 milliGRAM(s) IntraMuscular once  influenza   Vaccine 0.5 milliLiter(s) IntraMuscular once  insulin lispro (ADMELOG) corrective regimen sliding scale   SubCutaneous three times a day before meals  insulin lispro (ADMELOG) corrective regimen sliding scale   SubCutaneous at bedtime  lisinopril 5 milliGRAM(s) Oral daily  simvastatin 20 milliGRAM(s) Oral at bedtime  vancomycin  IVPB 1250 milliGRAM(s) IV Intermittent every 12 hours    MEDICATIONS  (PRN):      LABS                        14.8   8.02  )-----------( 232      ( 21 Mar 2021 06:42 )             45.1     03-21    134<L>  |  102  |  16  ----------------------------<  205<H>  4.0   |  22  |  0.69    Ca    8.8      21 Mar 2021 06:42  Phos  3.0     03-20  Mg     2.0     03-20    TPro  7.3  /  Alb  3.9  /  TBili  0.4  /  DBili  x   /  AST  28  /  ALT  49<H>  /  AlkPhos  76  03-21    LIVER FUNCTIONS - ( 21 Mar 2021 06:42 )  Alb: 3.9 g/dL / Pro: 7.3 g/dL / ALK PHOS: 76 U/L / ALT: 49 U/L / AST: 28 U/L / GGT: x           PT/INR - ( 19 Mar 2021 20:31 )   PT: 12.9 sec;   INR: 1.08 ratio         PTT - ( 19 Mar 2021 20:31 )  PTT:39.3 sec      MICROBIOLOGY     Culture - Blood (collected 20 Mar 2021 03:15)  Source: .Blood PICC/PERC Single Lumen  Preliminary Report (21 Mar 2021 04:00):    No growth to date.    Culture - Blood (collected 20 Mar 2021 00:46)  Source: .Blood Blood-Venous  Preliminary Report (21 Mar 2021 01:01):    No growth to date.        < from: VA Duplex Upper Ext Vein Scan, Right (03.19.21 @ 22:57) >  IMPRESSION:  Partial compression of the mid brachial and proximal basilic veins likely secondary to limited compression due to the PICC line; however, thrombus adjacent to the PICC line cannot be entirely excluded.    < end of copied text >  < from: Xray Chest 1 View- PORTABLE-Urgent (Xray Chest 1 View- PORTABLE-Urgent .) (03.19.21 @ 23:48) >      < end of copied text >   INFECTIOUS DISEASE PROGRESS NOTE  Being followed by ID for PICC line infection  Subjective: Patient feels like his arm is getting better, no fevers, no new complaints.     VITALS  Vital Signs Last 24 Hrs  T(C): 37.2 (21 Mar 2021 07:53), Max: 37.2 (21 Mar 2021 07:53)  T(F): 98.9 (21 Mar 2021 07:53), Max: 98.9 (21 Mar 2021 07:53)  HR: 69 (21 Mar 2021 07:53) (69 - 78)  BP: 116/71 (21 Mar 2021 07:53) (116/71 - 145/88)  BP(mean): --  RR: 18 (21 Mar 2021 07:53) (18 - 19)  SpO2: 98% (21 Mar 2021 07:53) (97% - 100%)    I&O's Summary    20 Mar 2021 07:01  -  21 Mar 2021 07:00  --------------------------------------------------------  IN: 1060 mL / OUT: 0 mL / NET: 1060 mL        CAPILLARY BLOOD GLUCOSE  POCT Blood Glucose.: 192 mg/dL (21 Mar 2021 08:48)  POCT Blood Glucose.: 145 mg/dL (20 Mar 2021 21:13)  POCT Blood Glucose.: 158 mg/dL (20 Mar 2021 17:20)  POCT Blood Glucose.: 160 mg/dL (20 Mar 2021 13:42)      PHYSICAL EXAM  Constitutional: Well appearing man sitting comfortably   Neck: supple; no JVD or LAD  Respiratory: CTA B/L  Cardiovascular: +S1/S2, RRR; no appreciable murmurs  Gastrointestinal: soft, NT/ND; +BSx4, no HSM  Extremities: WWP; no clubbing, cyanosis  Vascular: Former RUE PICC line site with crusting, surrounding erythema, warmth, and swelling, +TTP  Dermatologic: skin warm and dry; no visible rashes or lesions  Neurologic: AAOx3; no focal deficits    MEDICATIONS  (STANDING):  AQUAPHOR (petrolatum Ointment) 1 Application(s) Topical daily  cefepime   IVPB 2000 milliGRAM(s) IV Intermittent every 8 hours  dextrose 40% Gel 15 Gram(s) Oral once  dextrose 5%. 1000 milliLiter(s) (50 mL/Hr) IV Continuous <Continuous>  dextrose 5%. 1000 milliLiter(s) (100 mL/Hr) IV Continuous <Continuous>  dextrose 50% Injectable 25 Gram(s) IV Push once  dextrose 50% Injectable 12.5 Gram(s) IV Push once  dextrose 50% Injectable 25 Gram(s) IV Push once  enoxaparin Injectable 40 milliGRAM(s) SubCutaneous daily  glucagon  Injectable 1 milliGRAM(s) IntraMuscular once  influenza   Vaccine 0.5 milliLiter(s) IntraMuscular once  insulin lispro (ADMELOG) corrective regimen sliding scale   SubCutaneous three times a day before meals  insulin lispro (ADMELOG) corrective regimen sliding scale   SubCutaneous at bedtime  lisinopril 5 milliGRAM(s) Oral daily  simvastatin 20 milliGRAM(s) Oral at bedtime  vancomycin  IVPB 1250 milliGRAM(s) IV Intermittent every 12 hours    MEDICATIONS  (PRN):      LABS                        14.8   8.02  )-----------( 232      ( 21 Mar 2021 06:42 )             45.1     03-21    134<L>  |  102  |  16  ----------------------------<  205<H>  4.0   |  22  |  0.69    Ca    8.8      21 Mar 2021 06:42  Phos  3.0     03-20  Mg     2.0     03-20    TPro  7.3  /  Alb  3.9  /  TBili  0.4  /  DBili  x   /  AST  28  /  ALT  49<H>  /  AlkPhos  76  03-21    LIVER FUNCTIONS - ( 21 Mar 2021 06:42 )  Alb: 3.9 g/dL / Pro: 7.3 g/dL / ALK PHOS: 76 U/L / ALT: 49 U/L / AST: 28 U/L / GGT: x           PT/INR - ( 19 Mar 2021 20:31 )   PT: 12.9 sec;   INR: 1.08 ratio         PTT - ( 19 Mar 2021 20:31 )  PTT:39.3 sec      MICROBIOLOGY   Culture - Blood (collected 20 Mar 2021 03:15)  Source: .Blood PICC/PERC Single Lumen  Preliminary Report (21 Mar 2021 04:00):    No growth to date.    Culture - Blood (collected 20 Mar 2021 00:46)  Source: .Blood Blood-Venous  Preliminary Report (21 Mar 2021 01:01):    No growth to date.    RADIO:  VA Duplex Upper Ext Vein Scan, Right (03.19.21 @ 22:57)   IMPRESSION:  Partial compression of the mid brachial and proximal basilic veins likely secondary to limited compression due to the PICC line; however, thrombus adjacent to the PICC line cannot be entirely excluded.

## 2021-03-21 NOTE — PROGRESS NOTE ADULT - ASSESSMENT
Mr Nesbitt is a 55 year old man with DM, HTN, and HLD who is currently receiving cefepime via RUE PICC line at home for osteomyelitis of the skull. He developed redness, swelling, pain, and discharge from his PICC line, suggestive of PICC line infection.   Overall well appearing, no leukocytosis or fevers, but clear skin and soft tissue infection around his PICC line.     PICC line infection  - Continue vancomycin 1250mg q 12 hours  - Check vancomycin trough before 4th dose  - Follow up blood cultures  - Monitor for fevers  - Trend WBCs  - Repeat RUE ultrasound now that line has been removed to rule out clot    Osteomyelitis of the skull  - Continue cefepime 2g q 8 hours    Poppy Haji, PGY-5  Infectious Disease Fellow   Pager: [phone]  After 5pm/weekends: [phone]

## 2021-03-21 NOTE — PROGRESS NOTE ADULT - SUBJECTIVE AND OBJECTIVE BOX
Patient is a 56y old  Male who presents with a chief complaint of Picc line infection (21 Mar 2021 10:14)      INTERVAL HPI/OVERNIGHT EVENTS: feeling better     T(C): 36.8 (03-21-21 @ 16:41), Max: 37.2 (03-21-21 @ 07:53)  HR: 100 (03-21-21 @ 16:41) (69 - 100)  BP: 130/65 (03-21-21 @ 16:41) (116/71 - 145/88)  RR: 18 (03-21-21 @ 16:41) (18 - 19)  SpO2: 99% (03-21-21 @ 16:41) (97% - 99%)  Wt(kg): --  I&O's Summary    20 Mar 2021 07:01  -  21 Mar 2021 07:00  --------------------------------------------------------  IN: 1060 mL / OUT: 0 mL / NET: 1060 mL        PAST MEDICAL & SURGICAL HISTORY:  Osteomyelitis    H/O mastoiditis    COVID-19    T2DM (type 2 diabetes mellitus)    HLD (hyperlipidemia)    HTN (hypertension)    No significant past surgical history        SOCIAL HISTORY  Alcohol:  Tobacco:  Illicit substance use:    FAMILY HISTORY:    REVIEW OF SYSTEMS:  CONSTITUTIONAL: No fever, weight loss, or fatigue  EYES: No eye pain, visual disturbances, or discharge  ENMT:  No difficulty hearing, tinnitus, vertigo; No sinus or throat pain  NECK: No pain or stiffness  RESPIRATORY: No cough, wheezing, chills or hemoptysis; No shortness of breath  CARDIOVASCULAR: No chest pain, palpitations, dizziness, or leg swelling  GASTROINTESTINAL: No abdominal or epigastric pain. No nausea, vomiting, or hematemesis; No diarrhea or constipation. No melena or hematochezia.  GENITOURINARY: No dysuria, frequency, hematuria, or incontinence  NEUROLOGICAL: No headaches, memory loss, loss of strength, numbness, or tremors  SKIN: No itching, burning, rashes, or lesions   LYMPH NODES: No enlarged glands  ENDOCRINE: No heat or cold intolerance; No hair loss  MUSCULOSKELETAL: No joint pain or swelling; No muscle, back, or extremity pain  PSYCHIATRIC: No depression, anxiety, mood swings, or difficulty sleeping  HEME/LYMPH: No easy bruising, or bleeding gums  ALLERY AND IMMUNOLOGIC: No hives or eczema    RADIOLOGY & ADDITIONAL TESTS:    Imaging Personally Reviewed:  [ ] YES  [ ] NO    Consultant(s) Notes Reviewed:  [ ] YES  [ ] NO    PHYSICAL EXAM:  GENERAL: NAD, well-groomed, well-developed  HEAD:  Atraumatic, Normocephalic  EYES: EOMI, PERRLA, conjunctiva and sclera clear  ENMT: No tonsillar erythema, exudates, or enlargement; Moist mucous membranes, Good dentition, No lesions  NECK: Supple, No JVD, Normal thyroid  NERVOUS SYSTEM:  Alert & Oriented X3, Good concentration; Motor Strength 5/5 B/L upper and lower extremities; DTRs 2+ intact and symmetric  CHEST/LUNG: Clear to percussion bilaterally; No rales, rhonchi, wheezing, or rubs  HEART: Regular rate and rhythm; No murmurs, rubs, or gallops  ABDOMEN: Soft, Nontender, Nondistended; Bowel sounds present  EXTREMITIES:  2+ Peripheral Pulses, No clubbing, cyanosis, or edema  LYMPH: No lymphadenopathy noted  SKIN: No rashes or lesions    LABS:                        14.8   8.02  )-----------( 232      ( 21 Mar 2021 06:42 )             45.1     03-21    134<L>  |  102  |  16  ----------------------------<  205<H>  4.0   |  22  |  0.69    Ca    8.8      21 Mar 2021 06:42  Phos  3.0     03-20  Mg     2.0     03-20    TPro  7.3  /  Alb  3.9  /  TBili  0.4  /  DBili  x   /  AST  28  /  ALT  49<H>  /  AlkPhos  76  03-21    PT/INR - ( 19 Mar 2021 20:31 )   PT: 12.9 sec;   INR: 1.08 ratio         PTT - ( 19 Mar 2021 20:31 )  PTT:39.3 sec    CAPILLARY BLOOD GLUCOSE      POCT Blood Glucose.: 170 mg/dL (21 Mar 2021 17:19)  POCT Blood Glucose.: 160 mg/dL (21 Mar 2021 12:28)  POCT Blood Glucose.: 192 mg/dL (21 Mar 2021 08:48)  POCT Blood Glucose.: 145 mg/dL (20 Mar 2021 21:13)            MEDICATIONS  (STANDING):  AQUAPHOR (petrolatum Ointment) 1 Application(s) Topical daily  cefepime   IVPB 2000 milliGRAM(s) IV Intermittent every 8 hours  dextrose 40% Gel 15 Gram(s) Oral once  dextrose 5%. 1000 milliLiter(s) (50 mL/Hr) IV Continuous <Continuous>  dextrose 5%. 1000 milliLiter(s) (100 mL/Hr) IV Continuous <Continuous>  dextrose 50% Injectable 25 Gram(s) IV Push once  dextrose 50% Injectable 12.5 Gram(s) IV Push once  dextrose 50% Injectable 25 Gram(s) IV Push once  enoxaparin Injectable 40 milliGRAM(s) SubCutaneous daily  glucagon  Injectable 1 milliGRAM(s) IntraMuscular once  influenza   Vaccine 0.5 milliLiter(s) IntraMuscular once  insulin lispro (ADMELOG) corrective regimen sliding scale   SubCutaneous three times a day before meals  insulin lispro (ADMELOG) corrective regimen sliding scale   SubCutaneous at bedtime  lisinopril 5 milliGRAM(s) Oral daily  simvastatin 20 milliGRAM(s) Oral at bedtime  vancomycin  IVPB 1250 milliGRAM(s) IV Intermittent every 12 hours    MEDICATIONS  (PRN):      Care Discussed with Consultants/Other Providers [ ] YES  [ ] NO

## 2021-03-21 NOTE — PROGRESS NOTE ADULT - ATTENDING COMMENTS
Improving. As long as blood cultures remain negative I think he should be able to go home early this week. No culture taken of PICC line. Would not place another PICC.     Truong Zhong MD   Infectious Disease   Pager [phone]   After 5PM and on weekends please page fellow on call or call [phone]

## 2021-03-22 ENCOUNTER — TRANSCRIPTION ENCOUNTER (OUTPATIENT)
Age: 56
End: 2021-03-22

## 2021-03-22 VITALS
HEART RATE: 86 BPM | SYSTOLIC BLOOD PRESSURE: 146 MMHG | RESPIRATION RATE: 18 BRPM | OXYGEN SATURATION: 98 % | DIASTOLIC BLOOD PRESSURE: 84 MMHG | TEMPERATURE: 98 F

## 2021-03-22 LAB
ANION GAP SERPL CALC-SCNC: 15 MMOL/L — SIGNIFICANT CHANGE UP (ref 5–17)
BUN SERPL-MCNC: 15 MG/DL — SIGNIFICANT CHANGE UP (ref 7–23)
CALCIUM SERPL-MCNC: 9.3 MG/DL — SIGNIFICANT CHANGE UP (ref 8.4–10.5)
CHLORIDE SERPL-SCNC: 100 MMOL/L — SIGNIFICANT CHANGE UP (ref 96–108)
CO2 SERPL-SCNC: 20 MMOL/L — LOW (ref 22–31)
CREAT SERPL-MCNC: 0.67 MG/DL — SIGNIFICANT CHANGE UP (ref 0.5–1.3)
GLUCOSE BLDC GLUCOMTR-MCNC: 176 MG/DL — HIGH (ref 70–99)
GLUCOSE BLDC GLUCOMTR-MCNC: 247 MG/DL — HIGH (ref 70–99)
GLUCOSE SERPL-MCNC: 157 MG/DL — HIGH (ref 70–99)
HCT VFR BLD CALC: 46.1 % — SIGNIFICANT CHANGE UP (ref 39–50)
HGB BLD-MCNC: 15.4 G/DL — SIGNIFICANT CHANGE UP (ref 13–17)
MCHC RBC-ENTMCNC: 28.2 PG — SIGNIFICANT CHANGE UP (ref 27–34)
MCHC RBC-ENTMCNC: 33.4 GM/DL — SIGNIFICANT CHANGE UP (ref 32–36)
MCV RBC AUTO: 84.3 FL — SIGNIFICANT CHANGE UP (ref 80–100)
NRBC # BLD: 0 /100 WBCS — SIGNIFICANT CHANGE UP (ref 0–0)
PLATELET # BLD AUTO: 242 K/UL — SIGNIFICANT CHANGE UP (ref 150–400)
POTASSIUM SERPL-MCNC: 4.1 MMOL/L — SIGNIFICANT CHANGE UP (ref 3.5–5.3)
POTASSIUM SERPL-SCNC: 4.1 MMOL/L — SIGNIFICANT CHANGE UP (ref 3.5–5.3)
RBC # BLD: 5.47 M/UL — SIGNIFICANT CHANGE UP (ref 4.2–5.8)
RBC # FLD: 14.4 % — SIGNIFICANT CHANGE UP (ref 10.3–14.5)
SODIUM SERPL-SCNC: 135 MMOL/L — SIGNIFICANT CHANGE UP (ref 135–145)
WBC # BLD: 9.2 K/UL — SIGNIFICANT CHANGE UP (ref 3.8–10.5)
WBC # FLD AUTO: 9.2 K/UL — SIGNIFICANT CHANGE UP (ref 3.8–10.5)

## 2021-03-22 PROCEDURE — 82330 ASSAY OF CALCIUM: CPT

## 2021-03-22 PROCEDURE — 82947 ASSAY GLUCOSE BLOOD QUANT: CPT

## 2021-03-22 PROCEDURE — U0003: CPT

## 2021-03-22 PROCEDURE — 82962 GLUCOSE BLOOD TEST: CPT

## 2021-03-22 PROCEDURE — 87040 BLOOD CULTURE FOR BACTERIA: CPT

## 2021-03-22 PROCEDURE — 93971 EXTREMITY STUDY: CPT | Mod: 26,RT

## 2021-03-22 PROCEDURE — 93971 EXTREMITY STUDY: CPT

## 2021-03-22 PROCEDURE — 80202 ASSAY OF VANCOMYCIN: CPT

## 2021-03-22 PROCEDURE — 85014 HEMATOCRIT: CPT

## 2021-03-22 PROCEDURE — 84132 ASSAY OF SERUM POTASSIUM: CPT

## 2021-03-22 PROCEDURE — 83735 ASSAY OF MAGNESIUM: CPT

## 2021-03-22 PROCEDURE — 85025 COMPLETE CBC W/AUTO DIFF WBC: CPT

## 2021-03-22 PROCEDURE — 85027 COMPLETE CBC AUTOMATED: CPT

## 2021-03-22 PROCEDURE — 99285 EMERGENCY DEPT VISIT HI MDM: CPT | Mod: 25

## 2021-03-22 PROCEDURE — 86769 SARS-COV-2 COVID-19 ANTIBODY: CPT

## 2021-03-22 PROCEDURE — 96375 TX/PRO/DX INJ NEW DRUG ADDON: CPT

## 2021-03-22 PROCEDURE — U0005: CPT

## 2021-03-22 PROCEDURE — 82803 BLOOD GASES ANY COMBINATION: CPT

## 2021-03-22 PROCEDURE — 85018 HEMOGLOBIN: CPT

## 2021-03-22 PROCEDURE — 84100 ASSAY OF PHOSPHORUS: CPT

## 2021-03-22 PROCEDURE — 80048 BASIC METABOLIC PNL TOTAL CA: CPT

## 2021-03-22 PROCEDURE — 85610 PROTHROMBIN TIME: CPT

## 2021-03-22 PROCEDURE — 71045 X-RAY EXAM CHEST 1 VIEW: CPT

## 2021-03-22 PROCEDURE — 99232 SBSQ HOSP IP/OBS MODERATE 35: CPT

## 2021-03-22 PROCEDURE — 85730 THROMBOPLASTIN TIME PARTIAL: CPT

## 2021-03-22 PROCEDURE — 83605 ASSAY OF LACTIC ACID: CPT

## 2021-03-22 PROCEDURE — 84295 ASSAY OF SERUM SODIUM: CPT

## 2021-03-22 PROCEDURE — 80053 COMPREHEN METABOLIC PANEL: CPT

## 2021-03-22 PROCEDURE — 96374 THER/PROPH/DIAG INJ IV PUSH: CPT

## 2021-03-22 PROCEDURE — 82435 ASSAY OF BLOOD CHLORIDE: CPT

## 2021-03-22 RX ORDER — AZTREONAM 2 G
1 VIAL (EA) INJECTION
Qty: 10 | Refills: 0
Start: 2021-03-22 | End: 2021-03-26

## 2021-03-22 RX ORDER — JNJ-78436735 50000000000 [PFU]/.5ML
0.5 SUSPENSION INTRAMUSCULAR ONCE
Refills: 0 | Status: DISCONTINUED | OUTPATIENT
Start: 2021-03-22 | End: 2021-03-22

## 2021-03-22 RX ORDER — CIPROFLOXACIN LACTATE 400MG/40ML
1 VIAL (ML) INTRAVENOUS
Qty: 6 | Refills: 0
Start: 2021-03-22 | End: 2021-03-27

## 2021-03-22 RX ORDER — PETROLATUM,WHITE
1 JELLY (GRAM) TOPICAL
Qty: 0 | Refills: 0 | DISCHARGE
Start: 2021-03-22

## 2021-03-22 RX ADMIN — LISINOPRIL 5 MILLIGRAM(S): 2.5 TABLET ORAL at 06:18

## 2021-03-22 RX ADMIN — Medication 4: at 12:29

## 2021-03-22 RX ADMIN — ENOXAPARIN SODIUM 40 MILLIGRAM(S): 100 INJECTION SUBCUTANEOUS at 12:31

## 2021-03-22 RX ADMIN — Medication 300 MILLIGRAM(S): at 06:19

## 2021-03-22 RX ADMIN — Medication 2: at 08:41

## 2021-03-22 RX ADMIN — CEFEPIME 100 MILLIGRAM(S): 1 INJECTION, POWDER, FOR SOLUTION INTRAMUSCULAR; INTRAVENOUS at 06:19

## 2021-03-22 RX ADMIN — CEFEPIME 100 MILLIGRAM(S): 1 INJECTION, POWDER, FOR SOLUTION INTRAMUSCULAR; INTRAVENOUS at 14:45

## 2021-03-22 RX ADMIN — Medication 1 APPLICATION(S): at 12:31

## 2021-03-22 NOTE — DISCHARGE NOTE PROVIDER - CARE PROVIDER_API CALL
Carlos Cobb)  Infectious Disease; Internal Medicine  [address] [place] [address]  Phone: [phone]  Fax: [phone]  Follow Up Time:

## 2021-03-22 NOTE — DISCHARGE NOTE PROVIDER - HOSPITAL COURSE
Mr Nesbitt is a 55 year old man with DM, HTN, and HLD who is currently receiving cefepime via RUE PICC line at home for osteomyelitis of the skull. He developed redness,   swelling, pain, and discharge from his PICC line, suggestive of PICC line infection.   Overall well appearing, no leukocytosis or fevers, but clear skin and soft tissue infection around his PICC line.   Picc line was removed , started vancomycin and Cefepime . Blood Cx negative. Cleared for discharge as per ID on Levaquin and Bactrim     Mr Nesbitt is a 55 year old man with DM, HTN, and HLD who is currently receiving cefepime via RUE PICC line at home for osteomyelitis of the skull. He developed redness,   swelling, pain, and discharge from his PICC line, suggestive of PICC line infection.   Overall well appearing, no leukocytosis or fevers, but clear skin and soft tissue infection around his PICC line.   Picc line was removed , started vancomycin and Cefepime . Blood Cx negative. Cleared for discharge as per ID on Levaquin and Bactrim.  Patient agreed for vaccine, ordered 1 dose of covid vaccine . Discharge medication reconciliation DW Dr Quintana     Mr Nesbitt is a 55 year old man with DM, HTN, and HLD who is currently receiving cefepime via RUE PICC line at home for osteomyelitis of the skull. He developed redness,   swelling, pain, and discharge from his PICC line, suggestive of PICC line infection.   Overall well appearing, no leukocytosis or fevers, but clear skin and soft tissue infection around his PICC line.   Picc line was removed , started vancomycin and Cefepime . Blood Cx negative. Cleared for discharge as per ID on Levaquin and Bactrim.   Discharge medication reconciliation DW Dr Quintana

## 2021-03-22 NOTE — PROGRESS NOTE ADULT - SUBJECTIVE AND OBJECTIVE BOX
Follow Up: Phlebitis     Interval History/ROS: Afebrile, arm feels much better. No fevers. Anxious to go home. No diarrhea.     Allergies  No Known Allergies        ANTIMICROBIALS:  cefepime   IVPB 2000 every 8 hours  vancomycin  IVPB 1500 every 12 hours      OTHER MEDS:  AQUAPHOR (petrolatum Ointment) 1 Application(s) Topical daily  dextrose 40% Gel 15 Gram(s) Oral once  dextrose 5%. 1000 milliLiter(s) IV Continuous <Continuous>  dextrose 5%. 1000 milliLiter(s) IV Continuous <Continuous>  dextrose 50% Injectable 25 Gram(s) IV Push once  dextrose 50% Injectable 12.5 Gram(s) IV Push once  dextrose 50% Injectable 25 Gram(s) IV Push once  enoxaparin Injectable 40 milliGRAM(s) SubCutaneous daily  glucagon  Injectable 1 milliGRAM(s) IntraMuscular once  insulin lispro (ADMELOG) corrective regimen sliding scale   SubCutaneous three times a day before meals  insulin lispro (ADMELOG) corrective regimen sliding scale   SubCutaneous at bedtime  lisinopril 5 milliGRAM(s) Oral daily  simvastatin 20 milliGRAM(s) Oral at bedtime      Vital Signs Last 24 Hrs  T(C): 36.5 (22 Mar 2021 16:10), Max: 37 (22 Mar 2021 06:17)  T(F): 97.7 (22 Mar 2021 16:10), Max: 98.6 (22 Mar 2021 06:17)  HR: 86 (22 Mar 2021 16:10) (69 - 86)  BP: 146/84 (22 Mar 2021 16:10) (123/75 - 146/84)  BP(mean): --  RR: 18 (22 Mar 2021 16:10) (16 - 18)  SpO2: 98% (22 Mar 2021 16:10) (96% - 98%)    Physical Exam:  General: awake, alert, non toxic  Head: atraumatic, normocephalic  Eye: normal sclera and conjunctiva  ENT: no cervical lymphadenopathy   Cardio: regular rate   Respiratory: nonlabored on room air  abd: soft, bowel sounds present, no tenderness  vascular: much improved right arm phlebitis   Neurologic: no focal deficit  psych: normal affect                          15.4   9.20  )-----------( 242      ( 22 Mar 2021 07:04 )             46.1       03-22    135  |  100  |  15  ----------------------------<  157<H>  4.1   |  20<L>  |  0.67    Ca    9.3      22 Mar 2021 07:04    TPro  7.3  /  Alb  3.9  /  TBili  0.4  /  DBili  x   /  AST  28  /  ALT  49<H>  /  AlkPhos  76  03-21          MICROBIOLOGY:  Vancomycin Level, Trough: 7.4 ug/mL (03-21-21 @ 18:54)    Culture - Blood (collected 03-20-21 @ 03:15)  Source: .Blood PICC/PERC Single Lumen  Preliminary Report (03-21-21 @ 04:00):    No growth to date.    Culture - Blood (collected 03-20-21 @ 00:46)  Source: .Blood Blood-Venous  Preliminary Report (03-21-21 @ 01:01):    No growth to date.    RADIOLOGY:  Images below reviewed personally  VA Duplex Upper Ext Vein Scan, Right (03.22.21 @ 13:59)   No evidence of right upper extremity deep venous thrombosis.

## 2021-03-22 NOTE — CHART NOTE - NSCHARTNOTEFT_GEN_A_CORE
Notified by RN pt with Vancomycin trough 7.4 on 3/21. Increased Vancomycin from 1250 q12hrs to 1500 q12hrs. Pt remains afebrile.    F/u with ID in AM. F/u Vanco trough pre 4th dose.    -Milagros Shaffer PA-C  #[id number]

## 2021-03-22 NOTE — DISCHARGE NOTE PROVIDER - NSDCMRMEDTOKEN_GEN_ALL_CORE_FT
lisinopril 5 mg oral tablet: 1 tab(s) orally once a day  metFORMIN 500 mg oral tablet: 1 tab(s) orally 2 times a day  ofloxacin 0.3% otic solution: 5 drop(s) to each affected ear 2 times a day  petrolatum topical ointment: 1 application topically once a day  simvastatin 20 mg oral tablet: 1 tab(s) orally once a day (at bedtime)   Bactrim  mg-160 mg oral tablet: 1 tab(s) orally every 12 hours   levoFLOXacin 750 mg oral tablet: 1 tab(s) orally every 24 hours   lisinopril 5 mg oral tablet: 1 tab(s) orally once a day  metFORMIN 500 mg oral tablet: 1 tab(s) orally 2 times a day  ofloxacin 0.3% otic solution: 5 drop(s) to each affected ear 2 times a day  petrolatum topical ointment: 1 application topically once a day  simvastatin 20 mg oral tablet: 1 tab(s) orally once a day (at bedtime)

## 2021-03-22 NOTE — DISCHARGE NOTE PROVIDER - NSDCCPCAREPLAN_GEN_ALL_CORE_FT
PRINCIPAL DISCHARGE DIAGNOSIS  Diagnosis: Infection of peripherally inserted central catheter (PICC), initial encounter  Assessment and Plan of Treatment: PICC line was removed. Started on IV vancomycin and Cefepime. Blood culture negative. DC home on Levaquin through 3/28 and Bactrim through 3/26 as per ID. Follow up with Dr Cobb ID doctor.      SECONDARY DISCHARGE DIAGNOSES  Diagnosis: HTN (hypertension)  Assessment and Plan of Treatment: Low salt diet  Activity as tolerated.  Take all medication as prescribed.  Follow up with your medical doctor for routine blood pressure monitoring at your next visit.  Notify your doctor if you have any of the following symptoms:   Dizziness, Lightheadedness, Blurry vision, Headache, Chest pain, Shortness of breath      Diagnosis: T2DM (type 2 diabetes mellitus)  Assessment and Plan of Treatment: Continue current diabetic regimen    Diagnosis: Osteomyelitis  Assessment and Plan of Treatment: Continue antibiotic as recommended     PRINCIPAL DISCHARGE DIAGNOSIS  Diagnosis: Infection of peripherally inserted central catheter (PICC), initial encounter  Assessment and Plan of Treatment: PICC line was removed. Started on IV vancomycin (and Cefepime. Blood culture negative. DC home on Levaquin through 3/28 and Bactrim (for skin infection. )through 3/26 as per ID. Follow up with Dr Cobb ID doctor.      SECONDARY DISCHARGE DIAGNOSES  Diagnosis: HTN (hypertension)  Assessment and Plan of Treatment: Low salt diet  Activity as tolerated.  Take all medication as prescribed.  Follow up with your medical doctor for routine blood pressure monitoring at your next visit.  Notify your doctor if you have any of the following symptoms:   Dizziness, Lightheadedness, Blurry vision, Headache, Chest pain, Shortness of breath      Diagnosis: T2DM (type 2 diabetes mellitus)  Assessment and Plan of Treatment: Continue current diabetic regimen    Diagnosis: Osteomyelitis  Assessment and Plan of Treatment: Continue antibiotic as recommended

## 2021-03-22 NOTE — PROVIDER CONTACT NOTE (OTHER) - SITUATION
Pr receiving Vancomycin 1250mg. Trough level was drawn before 4th dose of vanco was hung, 4th dose resulted 7.4. Next dose of vancomycin 1250mg due @ 6AM.
PIV site red, vancomycin was infusing. PIV was removed and a new one was inserted. Vancomycin was restarted on new PIV, new site is WDL.

## 2021-03-22 NOTE — PROVIDER CONTACT NOTE (OTHER) - ASSESSMENT
Pt had redness only at the IV site & some pain. No swelling noted. Ice applied. Pt had no c/o of pain anywhere else or shortness of breath.

## 2021-03-22 NOTE — PROGRESS NOTE ADULT - ASSESSMENT
I don't think he need IV anymore for the ENT infection   Discharge on PO Levaquin 750mg daily through 3/28 for 6-week for ENT infection and Bactrim 1DS BID through Fri 3/26 for a 7-day course for SSTI     Truong Zhong MD   Infectious Disease   Pager [phone]   After 5PM and on weekends please page fellow on call or call [phone] 55M with DM, left otitis media with mastoiditis and skull base osteomyelitis in Feb on Cefepime, admitted for pain, erythema and discharge from his right arm PICC.   Line removed, not bacteremic, no DVT and site improved.   Would treat as a skin soft tissue infection.   No evidence of MRSA but this occurred while on Cefepime which should've covered MSSA among other skin paty.   He feels 100% normal from his ENT infection so I would convert everything to PO to avoid another line.     Suggest  -discharge on PO Levaquin 750mg daily through 3/28 for 6-week for his ENT infection and Bactrim 1DS BID through Fri 3/26 for a 7-day course for SSTI   -f/u with Dr Cobb next week     Spoke with primary team     Truong Zhong MD   Infectious Disease   Pager [phone]   After 5PM and on weekends please page fellow on call or call [phone]

## 2021-03-22 NOTE — PROVIDER CONTACT NOTE (OTHER) - ACTION/TREATMENT ORDERED:
Notified PA of situation. Pending further orders. Will continue to monitor pt.
Notified PA of situation. Ordered to monitor site where PIV was. Will monitor.

## 2021-03-22 NOTE — DISCHARGE NOTE NURSING/CASE MANAGEMENT/SOCIAL WORK - PATIENT PORTAL LINK FT
You can access the FollowMyHealth Patient Portal offered by [facility] by registering at the following website: [email or web address]. By joining [facility]’s FollowMyHealth portal, you will also be able to view your health information using other applications (apps) compatible with our system.

## 2021-03-22 NOTE — DISCHARGE NOTE PROVIDER - CARE PROVIDERS DIRECT ADDRESSES
[email or web address]

## 2021-03-25 LAB
CULTURE RESULTS: SIGNIFICANT CHANGE UP
CULTURE RESULTS: SIGNIFICANT CHANGE UP
SPECIMEN SOURCE: SIGNIFICANT CHANGE UP
SPECIMEN SOURCE: SIGNIFICANT CHANGE UP

## 2021-03-29 PROBLEM — Z86.69 PERSONAL HISTORY OF OTHER DISEASES OF THE NERVOUS SYSTEM AND SENSE ORGANS: Chronic | Status: ACTIVE | Noted: 2021-03-20

## 2021-03-29 PROBLEM — M86.9 OSTEOMYELITIS, UNSPECIFIED: Chronic | Status: ACTIVE | Noted: 2021-03-20

## 2021-03-29 PROBLEM — E11.9 TYPE 2 DIABETES MELLITUS WITHOUT COMPLICATIONS: Chronic | Status: ACTIVE | Noted: 2021-03-20

## 2021-03-29 PROBLEM — I10 ESSENTIAL (PRIMARY) HYPERTENSION: Chronic | Status: ACTIVE | Noted: 2021-03-20

## 2021-03-29 PROBLEM — E78.5 HYPERLIPIDEMIA, UNSPECIFIED: Chronic | Status: ACTIVE | Noted: 2021-03-20

## 2021-03-29 PROBLEM — U07.1 COVID-19: Chronic | Status: ACTIVE | Noted: 2021-03-20

## 2021-04-06 LAB
ANION GAP SERPL CALC-SCNC: 11 MMOL/L
BUN SERPL-MCNC: 15 MG/DL
CALCIUM SERPL-MCNC: 10.3 MG/DL
CHLORIDE SERPL-SCNC: 100 MMOL/L
CO2 SERPL-SCNC: 25 MMOL/L
CREAT SERPL-MCNC: 0.68 MG/DL
GLUCOSE SERPL-MCNC: 201 MG/DL
POTASSIUM SERPL-SCNC: 5.1 MMOL/L
SODIUM SERPL-SCNC: 135 MMOL/L

## 2021-04-13 ENCOUNTER — OUTPATIENT (OUTPATIENT)
Dept: OUTPATIENT SERVICES | Facility: HOSPITAL | Age: 56
LOS: 1 days | End: 2021-04-13
Payer: MEDICAID

## 2021-04-13 ENCOUNTER — APPOINTMENT (OUTPATIENT)
Dept: INFECTIOUS DISEASE | Facility: CLINIC | Age: 56
End: 2021-04-13
Payer: MEDICAID

## 2021-04-13 VITALS
TEMPERATURE: 98 F | RESPIRATION RATE: 16 BRPM | SYSTOLIC BLOOD PRESSURE: 130 MMHG | WEIGHT: 161 LBS | DIASTOLIC BLOOD PRESSURE: 80 MMHG | HEIGHT: 65 IN | BODY MASS INDEX: 26.82 KG/M2 | OXYGEN SATURATION: 97 % | HEART RATE: 69 BPM

## 2021-04-13 DIAGNOSIS — H60.22 MALIGNANT OTITIS EXTERNA, LEFT EAR: ICD-10-CM

## 2021-04-13 DIAGNOSIS — B97.89 OTHER VIRAL AGENTS AS THE CAUSE OF DISEASES CLASSIFIED ELSEWHERE: ICD-10-CM

## 2021-04-13 PROCEDURE — G0463: CPT

## 2021-04-13 PROCEDURE — 99213 OFFICE O/P EST LOW 20 MIN: CPT

## 2021-04-13 NOTE — ASSESSMENT
[FreeTextEntry1] : Left ear mastoiditis, OM\par s/p 6 week abx\par pain resolved\par still decreased hearing\par following up with ENT tomorrow\par No further abx needed\par \par \par R arm PICC site discharge\par s/p removal blood cx negative\par likely was irritation fropm tape v induration from PICC\par stable off abx\par \par advised about s/s infection to contact us if occur\par else to follow as needed \par asked to contact us  if issues\par Patient verbalized understanding

## 2021-04-13 NOTE — HISTORY OF PRESENT ILLNESS
[FreeTextEntry1] : 57 yo man recently seen in hospital in consultation for Left ear infection/Osteomyelitis/mastoiditis\par was seen by ENT no surgical intervention\par was started on IV [id number] to cefepime and discharged home on same\par 6 week course planned\par Pt now for follow up\par In interim had discharge from PICC site\par was admitted 3/16-had blood Cx and PICC line removed\par changed to po abx\par Finished 6 weks abx about 3 weeks ago\par here for follow up\par feels well\par no ear pain

## 2021-04-13 NOTE — PHYSICAL EXAM
[General Appearance - Alert] : alert [General Appearance - In No Acute Distress] : in no acute distress [General Appearance - Well Nourished] : well nourished [Sclera] : the sclera and conjunctiva were normal [Extraocular Movements] : extraocular movements were intact [Outer Ear] : the ears and nose were normal in appearance [Examination Of The Oral Cavity] : the lips and gums were normal [Neck Appearance] : the appearance of the neck was normal [] : the neck was supple [Neck Cervical Mass (___cm)] : no neck mass was observed [Heart Rate And Rhythm] : heart rate was normal and rhythm regular [Heart Sounds] : normal S1 and S2 [Edema] : there was no peripheral edema [Bowel Sounds] : normal bowel sounds [Abdomen Soft] : soft [Abdomen Tenderness] : non-tender [No Palpable Adenopathy] : no palpable adenopathy [Musculoskeletal - Swelling] : no joint swelling [Skin Color & Pigmentation] : normal skin color and pigmentation [Cranial Nerves] : cranial nerves 2-12 were intact [Oriented To Time, Place, And Person] : oriented to person, place, and time [FreeTextEntry1] : R PICC removed site-mild induration no discvharge no tenderness

## 2021-04-13 NOTE — REVIEW OF SYSTEMS
[Loss Of Hearing] : hearing loss [Fever] : no fever [Chills] : no chills [Eye Pain] : no eye pain [Earache] : no earache [Nasal Discharge] : no nasal discharge [Sore Throat] : no sore throat [Chest Pain] : no chest pain [Shortness Of Breath] : no shortness of breath [Wheezing] : no wheezing [Abdominal Pain] : no abdominal pain [Joint Pain] : no joint pain [Confused] : no confusion [FreeTextEntry4] : ear and preauricualr painresolved

## 2021-04-14 ENCOUNTER — APPOINTMENT (OUTPATIENT)
Dept: OTOLARYNGOLOGY | Facility: CLINIC | Age: 56
End: 2021-04-14
Payer: MEDICAID

## 2021-04-14 VITALS
DIASTOLIC BLOOD PRESSURE: 70 MMHG | BODY MASS INDEX: 26.82 KG/M2 | TEMPERATURE: 98 F | SYSTOLIC BLOOD PRESSURE: 106 MMHG | HEART RATE: 76 BPM | HEIGHT: 65 IN | WEIGHT: 161 LBS

## 2021-04-14 PROCEDURE — 99072 ADDL SUPL MATRL&STAF TM PHE: CPT

## 2021-04-14 PROCEDURE — 99213 OFFICE O/P EST LOW 20 MIN: CPT | Mod: 25

## 2021-04-14 RX ORDER — ERGOCALCIFEROL 1.25 MG/1
1.25 MG CAPSULE, LIQUID FILLED ORAL
Qty: 4 | Refills: 0 | Status: ACTIVE | COMMUNITY
Start: 2021-03-17

## 2021-04-14 RX ORDER — METFORMIN HYDROCHLORIDE 1000 MG/1
1000 TABLET, COATED ORAL
Qty: 60 | Refills: 0 | Status: ACTIVE | COMMUNITY
Start: 2021-03-27

## 2021-04-14 RX ORDER — SIMVASTATIN 20 MG/1
20 TABLET, FILM COATED ORAL
Qty: 30 | Refills: 0 | Status: ACTIVE | COMMUNITY
Start: 2021-03-24

## 2021-04-14 NOTE — ASSESSMENT
[FreeTextEntry1] : Patient with hx of DM presents w/ Malignant OE \par \par Left Malignant OEseems resolved\par IV abx and otic drop tx have now been completed\par -IV abx treatment finished,PICC was removed by ID \par -D/C ear drops \par -H2O precautions reviewed\par -Advised to keep DM under control \par -pt doing better\par -Repeat MRI in 6 weeks \par \par f/u 1 month or prn \par \par \par I personally saw and examined RAY FISCHER in detail. I spoke to NNEKA Castellanos regarding the assessment and plan of care. I reviewed the above assessment and plan of care, and agree. I have made changes in changes in the body of the note where appropriate.I personally reviewed the HPI, PMH, FH, SH, ROS and medications/allergies. I have spoken to NNEKA Castellanos regarding the history and have personally determined the assessment and plan of care, and documented this myself. I was present and participated in all key portions of the encounter and all procedures noted above. I have made changes in the body of the note where appropriate.\par \par Attesting Faculty: See Attending Signature Below \par \par \par \par

## 2021-04-14 NOTE — DATA REVIEWED
[de-identified] : Hearing Test performed to evaluate the extent of hearing loss and  to explain pt's symptoms\par today's hearing loss was personally reviewed and revealed\par bilateral mixed hearing loss [de-identified] : personally reviewed and MRI reveals left-sided scleral base inflammation and nasopharyngeal fullness

## 2021-04-14 NOTE — PHYSICAL EXAM
[Midline] : trachea located in midline position [Normal] : the left mastoid was normal [de-identified] : no focal deficits aside from hearing loss [Hearing Loss Left Only] : normal [Hearing Loss Right Only] : normal [FreeTextEntry8] : wax [de-identified] : TM perf -dry and clean [de-identified] : TM perf-dry and clean [de-identified] : wnl\par no facial weakness noted

## 2021-04-14 NOTE — HISTORY OF PRESENT ILLNESS
[No] : patient does not have a  history of radiation therapy [Hearing Loss] : hearing loss [Ear Fullness] : ear fullness [Otalgia] : otalgia [Diabetes] : diabetes [None] : No risk factors have been identified. 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 He started having pain after he used a Qtip in the left ear. He had severe left sided ear pain, in the ED he was given IV abx needs to be on it for 8 weeks. Today he is feeling better, ear pain gets better everyday. \par no other modifying factors\par He is already being followed by infectious diseases\par He has a right-sided intravenous line in his receiving intravenous antibiotic therapy and is developing relief of his symptoms\par \par  [FreeTextEntry1] : Patient presents for follow up s/p Left Malignant OE. States he is doing better, pain has decreased. Finished IV abx, PICC line was removed. \par \par  [Vertigo] : no vertigo [Eustachian Tube Dysfunction] : no eustachian tube dysfunction [Cholesteatoma] : no cholesteatoma [Early Onset Hearing Loss] : no early onset hearing loss [Stroke] : no stroke [Nasal Congestion] : no nasal congestion [Allergic Rhinitis] : no allergic rhinitis [Asthma] : no asthma [Environmental Allergies] : no environmental allergies [Seasonal Allergies] : no seasonal allergies [Environmental Allergens] : no environmental allergens [Adenoidectomy] : no adenoidectomy [Hyperthyroidism] : no hyperthyroidism [Sialadenitis] : no sialadenitis [Hodgkin Disease] : no hodgkin disease [Non-Hodgkin Lymphoma] : no non-hodgkin lymphoma [Graves Disease] : no graves disease [Thyroid Cancer] : no thyroid cancer

## 2021-05-19 ENCOUNTER — APPOINTMENT (OUTPATIENT)
Dept: OTOLARYNGOLOGY | Facility: CLINIC | Age: 56
End: 2021-05-19
Payer: MEDICAID

## 2021-05-19 VITALS
SYSTOLIC BLOOD PRESSURE: 133 MMHG | WEIGHT: 161 LBS | TEMPERATURE: 98 F | BODY MASS INDEX: 26.82 KG/M2 | DIASTOLIC BLOOD PRESSURE: 85 MMHG | HEART RATE: 103 BPM | HEIGHT: 65 IN

## 2021-05-19 PROCEDURE — 99213 OFFICE O/P EST LOW 20 MIN: CPT | Mod: 25

## 2021-05-19 PROCEDURE — 92557 COMPREHENSIVE HEARING TEST: CPT

## 2021-05-19 PROCEDURE — 92504 EAR MICROSCOPY EXAMINATION: CPT

## 2021-05-19 PROCEDURE — 92567 TYMPANOMETRY: CPT

## 2021-05-19 NOTE — END OF VISIT
[Time Spent: ___ minutes] : I have spent [unfilled] minutes of time on the encounter. [FreeTextEntry3] : I personally saw and examined RAY FISCHER in detail. I spoke to NNEKA Castellanos regarding the assessment and plan of care. I reviewed the above assessment and plan of care, and agree. I have made changes in changes in the body of the note where appropriate.I personally reviewed the HPI, PMH, FH, SH, ROS and medications/allergies. I have spoken to NNEKA Castellanos regarding the history and have personally determined the assessment and plan of care, and documented this myself. I was present and participated in all key portions of the encounter and all procedures noted above. I have made changes in the body of the note where appropriate.\par \par Attesting Faculty: See Attending Signature Below \par \par \par

## 2021-05-19 NOTE — PROCEDURE
[Risk and Benefits Discussed] : The purpose, risks, discomforts, benefits and alternatives of the procedure have been explained to the patient including no treatment. [Cerumen Impaction] : Cerumen Impaction [] : Binocular Microscopy [FreeTextEntry1] : hearing loss [FreeTextEntry6] : the microscope was necessary for illumination and magnification\par wax removed\par bilat subtotal TM perf's-clean

## 2021-05-19 NOTE — PHYSICAL EXAM
[Midline] : trachea located in midline position [Normal] : the right external canal was normal [de-identified] : no focal deficits aside from hearing loss [Hearing Loss Right Only] : normal [Hearing Loss Left Only] : normal [de-identified] : TM perf -dry and clean [de-identified] : TM perf-dry and clean [de-identified] : wnl\par no facial weakness noted

## 2021-05-19 NOTE — HISTORY OF PRESENT ILLNESS
[No] : patient does not have a  history of radiation therapy [Hearing Loss] : hearing loss [Ear Fullness] : ear fullness [Otalgia] : otalgia [Diabetes] : diabetes [None] : No risk factors have been identified. 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 He started having pain after he used a Qtip in the left ear. He had severe left sided ear pain, in the ED he was given IV abx needs to be on it for 8 weeks. Today he is feeling better, ear pain gets better everyday. \par no other modifying factors\par He is already being followed by infectious diseases\par He has a right-sided intravenous line in his receiving intravenous antibiotic therapy and is developing relief of his symptoms\par \par  [FreeTextEntry1] : 5/19/2021:\par Patient presents for follow up. States he is feeling well. Denies ear pain or discharge. No other modifying factors\par \par Patient presents for follow up s/p Left Malignant OE. States he is doing better, pain has decreased. Finished IV abx, PICC line was removed. \par \par  [Vertigo] : no vertigo [Eustachian Tube Dysfunction] : no eustachian tube dysfunction [Cholesteatoma] : no cholesteatoma [Early Onset Hearing Loss] : no early onset hearing loss [Stroke] : no stroke [Nasal Congestion] : no nasal congestion [Allergic Rhinitis] : no allergic rhinitis [Asthma] : no asthma [Environmental Allergies] : no environmental allergies [Seasonal Allergies] : no seasonal allergies [Environmental Allergens] : no environmental allergens [Adenoidectomy] : no adenoidectomy [Hyperthyroidism] : no hyperthyroidism [Sialadenitis] : no sialadenitis [Hodgkin Disease] : no hodgkin disease [Non-Hodgkin Lymphoma] : no non-hodgkin lymphoma [Graves Disease] : no graves disease [Thyroid Cancer] : no thyroid cancer

## 2021-05-19 NOTE — ASSESSMENT
[FreeTextEntry1] : Patient with hx of DM presents w/ Malignant OE \par \par Left Malignant OE seems resolved\par -IV abx treatment finished,PICC was removed by ID \par -continue DM under control \par -Hearing Test performed to evaluate the extent of hearing loss and to explain pt's symptoms \par -Repeat MRI in in a few months\par Rec HAE and if unhappy w/ hearing aids then poss tympanoplasty\par bilateral sensorineural hearing loss-cleared for hearing aids\par \par \par \par \par \par \par

## 2021-05-19 NOTE — DATA REVIEWED
[de-identified] : Hearing Test performed to evaluate the extent of hearing loss and  to explain pt's symptoms\par today's hearing loss was personally reviewed and revealed\par bilateral mixed hearing loss [de-identified] : personally reviewed and MRI reveals left-sided scleral base inflammation and nasopharyngeal fullness

## 2021-07-26 ENCOUNTER — RX RENEWAL (OUTPATIENT)
Age: 56
End: 2021-07-26

## 2021-08-11 NOTE — PATIENT PROFILE ADULT - MEDICATIONS/VISITS
1st attempt to reach patient to reschedule missed INR appt on 8/5/21. Left voicemail message and provided call back number.   no

## 2021-08-16 ENCOUNTER — APPOINTMENT (OUTPATIENT)
Dept: OTOLARYNGOLOGY | Facility: CLINIC | Age: 56
End: 2021-08-16
Payer: MEDICAID

## 2021-08-16 VITALS
TEMPERATURE: 97.9 F | BODY MASS INDEX: 26.82 KG/M2 | WEIGHT: 161 LBS | SYSTOLIC BLOOD PRESSURE: 131 MMHG | DIASTOLIC BLOOD PRESSURE: 77 MMHG | HEIGHT: 65 IN | HEART RATE: 99 BPM

## 2021-08-16 PROCEDURE — 99214 OFFICE O/P EST MOD 30 MIN: CPT | Mod: 25

## 2021-08-16 NOTE — HISTORY OF PRESENT ILLNESS
[No] : patient does not have a  history of radiation therapy [Hearing Loss] : hearing loss [Ear Fullness] : ear fullness [Otalgia] : otalgia [Diabetes] : diabetes [None] : No risk factors have been identified. [FreeTextEntry1] : \par  [de-identified] : 55 yo male\par Patient with hx of DM and  Left Malignant OE treated with IV abx with PICC line complains of bilateral ear drainage and itch x 2 weeks. Pt has no ear pain, hearing loss, tinnitus, vertigo, nasal congestion, nasal discharge, epistaxis, sinus infections, facial pain, facial pressure, throat pain, dysphagia or fevers\par \par \par \par  [Vertigo] : no vertigo [Eustachian Tube Dysfunction] : no eustachian tube dysfunction [Cholesteatoma] : no cholesteatoma [Early Onset Hearing Loss] : no early onset hearing loss [Stroke] : no stroke [Nasal Congestion] : no nasal congestion [Allergic Rhinitis] : no allergic rhinitis [Asthma] : no asthma [Environmental Allergies] : no environmental allergies [Seasonal Allergies] : no seasonal allergies [Environmental Allergens] : no environmental allergens [Adenoidectomy] : no adenoidectomy [Hyperthyroidism] : no hyperthyroidism [Sialadenitis] : no sialadenitis [Hodgkin Disease] : no hodgkin disease [Non-Hodgkin Lymphoma] : no non-hodgkin lymphoma [Graves Disease] : no graves disease [Thyroid Cancer] : no thyroid cancer

## 2021-08-16 NOTE — PHYSICAL EXAM
[Midline] : trachea located in midline position [Normal] : the left mastoid was normal [de-identified] : b/l otorrhea  [de-identified] : bilat mucoid perf's [] : septum deviated bilaterally [de-identified] : no focal deficits aside from hearing loss [Hearing Loss Right Only] : normal [Hearing Loss Left Only] : normal [de-identified] : TM perf  with moist debris  [de-identified] : TM perf with moist debris  [de-identified] : wnl\par no facial weakness noted

## 2021-08-16 NOTE — DATA REVIEWED
[de-identified] : Hearing Test performed to evaluate the extent of hearing loss and  to explain pt's symptoms\par today's hearing loss was personally reviewed and revealed\par bilateral mixed hearing loss [de-identified] : personally reviewed and MRI reveals left-sided scleral base inflammation and nasopharyngeal fullness

## 2021-08-16 NOTE — ASSESSMENT
[FreeTextEntry1] : Patient with hx of DM and  Malignant OE treated with IV abx with PICC line complains of b/l ear drainage x 2 weeks \par \par B/l Otorrhea:\par -both ears culture collected \par -Rx: Ciprodex drops bilat \par -H2O precautions reviewed \par -better control DM \par -poss Repeat MRI\par direct tx w/ C&S results\par poss ID consult\par \par f/u 4 days or prn \par \par \par \par \par \par \par

## 2021-08-16 NOTE — END OF VISIT
[FreeTextEntry3] : I personally saw and examined RAY FISCHER in detail. I spoke to NNEKA Castellanos regarding the assessment and plan of care. I reviewed the above assessment and plan of care, and agree. I have made changes in changes in the body of the note where appropriate.I personally reviewed the HPI, PMH, FH, SH, ROS and medications/allergies. I have spoken to NNEKA Castellanos regarding the history and have personally determined the assessment and plan of care, and documented this myself. I was present and participated in all key portions of the encounter and all procedures noted above. I have made changes in the body of the note where appropriate.\par \par Attesting Faculty: See Attending Signature Below \par \par \par  [Time Spent: ___ minutes] : I have spent [unfilled] minutes of time on the encounter.

## 2021-08-19 LAB — BACTERIA SPEC CULT: ABNORMAL

## 2021-08-20 ENCOUNTER — APPOINTMENT (OUTPATIENT)
Dept: OTOLARYNGOLOGY | Facility: CLINIC | Age: 56
End: 2021-08-20
Payer: MEDICAID

## 2021-08-20 ENCOUNTER — TRANSCRIPTION ENCOUNTER (OUTPATIENT)
Age: 56
End: 2021-08-20

## 2021-08-20 VITALS
HEART RATE: 94 BPM | DIASTOLIC BLOOD PRESSURE: 82 MMHG | TEMPERATURE: 97.7 F | BODY MASS INDEX: 26.66 KG/M2 | SYSTOLIC BLOOD PRESSURE: 129 MMHG | HEIGHT: 65 IN | WEIGHT: 160 LBS

## 2021-08-20 DIAGNOSIS — J34.89 OTHER SPECIFIED DISORDERS OF NOSE AND NASAL SINUSES: ICD-10-CM

## 2021-08-20 PROCEDURE — 99214 OFFICE O/P EST MOD 30 MIN: CPT | Mod: 25

## 2021-08-20 PROCEDURE — 31231 NASAL ENDOSCOPY DX: CPT

## 2021-08-20 NOTE — PHYSICAL EXAM
[] : septum deviated bilaterally [Midline] : trachea located in midline position [Normal] : the left mastoid was normal [de-identified] : b/l moist debris and wax [de-identified] : bilat mucoid perf's [Nasal Endoscopy Performed] : nasal endoscopy was performed, see procedure section for findings [de-identified] : no focal deficits aside from hearing loss [Hearing Loss Right Only] : normal [Hearing Loss Left Only] : normal [de-identified] : TM perf  with moist debris  [de-identified] : TM perf with moist debris  [de-identified] : wnl\par no facial weakness noted

## 2021-08-20 NOTE — HISTORY OF PRESENT ILLNESS
[No] : patient does not have a  history of radiation therapy [Hearing Loss] : hearing loss [Ear Fullness] : ear fullness [Otalgia] : otalgia [Diabetes] : diabetes [None] : No risk factors have been identified. [de-identified] : 57 yo male\par Patient with hx of DM and  Left Malignant OE treated with IV abx with PICC line complains of bilateral ear drainage and itch x 2 weeks. Pt has no ear pain, hearing loss, tinnitus, vertigo, nasal congestion, nasal discharge, epistaxis, sinus infections, facial pain, facial pressure, throat pain, dysphagia or fevers\par \par \par \par  [FreeTextEntry1] : 8/20/2021\par Patient presents for follow up s/p b/l otorrhea. Using Ciprodex drops daily, hes feeling better today. Ear drainage resolved. Complains of runny nose,not using any nasal sprays. No other modifying factors\par \par  [Vertigo] : no vertigo [Eustachian Tube Dysfunction] : no eustachian tube dysfunction [Cholesteatoma] : no cholesteatoma [Early Onset Hearing Loss] : no early onset hearing loss [Stroke] : no stroke [Nasal Congestion] : no nasal congestion [Allergic Rhinitis] : no allergic rhinitis [Asthma] : no asthma [Environmental Allergies] : no environmental allergies [Seasonal Allergies] : no seasonal allergies [Environmental Allergens] : no environmental allergens [Adenoidectomy] : no adenoidectomy [Hyperthyroidism] : no hyperthyroidism [Sialadenitis] : no sialadenitis [Hodgkin Disease] : no hodgkin disease [Non-Hodgkin Lymphoma] : no non-hodgkin lymphoma [Graves Disease] : no graves disease [Thyroid Cancer] : no thyroid cancer

## 2021-08-20 NOTE — ASSESSMENT
[FreeTextEntry1] : Patient with hx of DM and  Malignant OE treated with IV abx with PICC line presents s/p b/l ottorhea \par \par B/l Otorrhea:\par -culture reviewed wnl \par -both ears cleaned \par -continue Ciprodex \par -H2O precautions reviewed \par -better control DM \par \par Rhinorrhea and DNS:\par -Rx: Azelastine \par \par f/u 10 days  or prn \par \par \par \par \par \par \par

## 2021-08-20 NOTE — DATA REVIEWED
[de-identified] : Hearing Test performed to evaluate the extent of hearing loss and  to explain pt's symptoms\par today's hearing loss was personally reviewed and revealed\par bilateral mixed hearing loss [de-identified] : personally reviewed and MRI reveals left-sided scleral base inflammation and nasopharyngeal fullness

## 2021-08-30 ENCOUNTER — APPOINTMENT (OUTPATIENT)
Dept: OTOLARYNGOLOGY | Facility: CLINIC | Age: 56
End: 2021-08-30
Payer: MEDICAID

## 2021-08-30 VITALS
SYSTOLIC BLOOD PRESSURE: 119 MMHG | WEIGHT: 160 LBS | BODY MASS INDEX: 26.66 KG/M2 | DIASTOLIC BLOOD PRESSURE: 76 MMHG | HEART RATE: 87 BPM | TEMPERATURE: 98.1 F | HEIGHT: 65 IN

## 2021-08-30 PROCEDURE — 99213 OFFICE O/P EST LOW 20 MIN: CPT | Mod: 25

## 2021-08-30 NOTE — PHYSICAL EXAM
[Nasal Endoscopy Performed] : nasal endoscopy was performed, see procedure section for findings [] : septum deviated bilaterally [Midline] : trachea located in midline position [Normal] : the left mastoid was normal [de-identified] : b/l moist debris and wax [de-identified] : bilat mucoid perf's-not infected [de-identified] : no focal deficits aside from hearing loss [Hearing Loss Right Only] : normal [Hearing Loss Left Only] : normal [de-identified] : TM perf  clean and dry  [de-identified] : wnl\par no facial weakness noted [de-identified] : TM perf clean and dry

## 2021-08-30 NOTE — ASSESSMENT
[FreeTextEntry1] : Patient with hx of DM and  Malignant OE treated with IV abx with PICC line presents s/p b/l ottorhea \par \par B/l Otorrhea:\par -doing well today \par -both ears are clean today \par -discontinue Ciprodex \par -H2O precautions reviewed \par -better control DM \par \par Rhinorrhea and DNS:\par -continue Azelastine \par consider tympanoplasty\par \par f/u 1 month or prn \par \par \par \par \par \par \par

## 2021-08-30 NOTE — DATA REVIEWED
[de-identified] : Hearing Test performed to evaluate the extent of hearing loss and  to explain pt's symptoms\par today's hearing loss was personally reviewed and revealed\par bilateral mixed hearing loss [de-identified] : personally reviewed and MRI reveals left-sided scleral base inflammation and nasopharyngeal fullness

## 2021-08-30 NOTE — HISTORY OF PRESENT ILLNESS
[No] : patient does not have a  history of radiation therapy [Hearing Loss] : hearing loss [Ear Fullness] : ear fullness [Otalgia] : otalgia [Diabetes] : diabetes [None] : No risk factors have been identified. [de-identified] : 55 yo male\par Patient with hx of DM and  Left Malignant OE treated with IV abx with PICC line complains of bilateral ear drainage and itch x 2 weeks. Pt has no ear pain, hearing loss, tinnitus, vertigo, nasal congestion, nasal discharge, epistaxis, sinus infections, facial pain, facial pressure, throat pain, dysphagia or fevers\par \par \par \par  [FreeTextEntry1] : 8/30/2021:\par Patient presents for follow up s/p b/l otorrhea. Using Ciprodex and Azelastine daily, hes feeling better today. Ear drainage resolved. He can breath better from his nose.  No other modifying factors\par \par \par \par  [Vertigo] : no vertigo [Eustachian Tube Dysfunction] : no eustachian tube dysfunction [Cholesteatoma] : no cholesteatoma [Early Onset Hearing Loss] : no early onset hearing loss [Stroke] : no stroke [Nasal Congestion] : no nasal congestion [Allergic Rhinitis] : no allergic rhinitis [Asthma] : no asthma [Environmental Allergies] : no environmental allergies [Seasonal Allergies] : no seasonal allergies [Environmental Allergens] : no environmental allergens [Adenoidectomy] : no adenoidectomy [Hyperthyroidism] : no hyperthyroidism [Sialadenitis] : no sialadenitis [Hodgkin Disease] : no hodgkin disease [Non-Hodgkin Lymphoma] : no non-hodgkin lymphoma [Graves Disease] : no graves disease [Thyroid Cancer] : no thyroid cancer

## 2021-09-08 ENCOUNTER — NON-APPOINTMENT (OUTPATIENT)
Age: 56
End: 2021-09-08

## 2021-09-27 NOTE — PROGRESS NOTE ADULT - ASSESSMENT
OT Daily Note  Today's date: 2021  Patient name: Latonya Richards  : 2018  MRN: [id number]  Referring provider: Abdelrahman Tapia  Dx:   Encounter Diagnosis     ICD-10-CM    1  Developmental delay  R62 50      Following established CDC and hospital protocols confirmed that Earleen Cranker was wearing an appropriate mask or face covering (PPE) OR Child was not able to wear facemask due to age/condition  Therapist was wearing the appropriate PPE consisting of surgical mask, KN95 mask, glasses, or face shield depending on patients masking status  The mandatory travel, community and communication screening was completed prior to entering the clinic and documented by the therapist, with the result of no illness or risk present or suspected  Earleen Cranker  was accompanied directly into a disinfected and clean therapy gym using social distancing with other staff/peers  Subjective: Katheryn transitioned from waiting room independently  She arrived to session overstimulated and had difficulty with self-regulation throughout session  Objective:     Katheryn will engage in simple play activities from start to finish with the use of sensory strategies as needed    : Earleen Cranker demonstrated successful attention to task and task completion with moderate redirection to remain in her chair  She was distracted by and fixated on turning lights on/off however was able to follow verbal direction to come back to tabletop play  Serafin Ambrocio demonstrated successful attention to tabletop play  She remained at table with a preferred and familiar activity for first 15 minutes before eloping and turning the lights off  She was redirected back to the table and completed play activity with use of motivating flash light  Occasional sensory support (squeezes and tactile play with play cecy) required to remain on task  : Katheryn required moderate redirection for attention to tabletop play today  Moderate jumping between tasks today    Serafin Ambrocio 55M hx of DMT2, HLD, HTN, Covid-19 (mild in Oct 2020) present to ED due to persistent left sided headache for 1 month. Initially treated for mastoiditis with 10 days of augmentin, now found to have infiltrative process on MRI concerning for skull base OM vs neoplasm.  Patient symptoms unresponsive and progressive despite 10 days of po augmentin  ? Need fro debridement  also in view of OM will need long (6-8 week) course and obtaining tissue and microbiology will be immensely helpful in antimicrobial selection  MRSA PCR negative     Rec:  1) ENT evaluation noted-no intervention  2) continue cefepime  PICC side effects,abx side effects discussed.Risks/benefits and alternatives explained.  CBC,CMP weekly,fax results to [phone].Call [phone] with critical results.  PICC care per home care.  Dc planning in progress for today   To follow in ID clinic in 3-4 weeks ,asked to call and make appointment.  3) Monitor WBC  4) Optimize DM control  5) s/s worsening explained to patient-if occur asked to come back to hospital or contact us     Will tailor plan for ID issues  per course,results.Will defer to primary team on management of other issues.  Assessment, plan and recommendations as detailed above were discussed with the medical team   ID will follow as needed during hospital stay,please call [phone] if any questions or issues.  to follow in ID clinic after DC required initial redirection to tabletop however was able to complete button art at table before transitioning to floor  She participated in ball popper activity as well as foam puzzles with occasional redirection for task completion   9/2: Augustin Weems transitioned into therapy room and to tabletop without difficulty  She required moderate redirection throughout session in order to remain at table until task completion  Augustin Weems accepted redirection without behaviors  9/9Kebreonna Lopez demonstrated elopement >10 times throughout 30 minute session  She required maximal redirection in order to return to table for task completion  Seemingly overstimulated during session however resisted sensory calming strategies from therapist  9/13: Augustin Weems engaged in novel lite brite activity for up to 15 minutes with full attention to task  When she was finished, she picked up the lite brite, put it on the counter and picked a new activity  She did present with increased self-directedness with velcro fruit and bingo stampers  9/16: elopement x5 throughout session  Augustin Weems demonstrated nice participation in session however did have difficulty with task completion, requiring redirection and support  Activities today included squigz, markers on white board, brown bear book with matching picture cards, bubbles, ball popper and bingo stampers  9/20: Augustin Weems engaged in [address] [place] for up to 10 minutes and successfully cleaned up before transitioning to a new activity  She also demonstrated increased participation and task completion with brown bear book and matching picture cards  9/27: Augustin Weems required maximal support for participation in all activities  She demonstrated maximal elopement with the need for verbal and physical redirection  Unsuccessful with independent task completion today and generally required demands to be altered or task to be completed with hand over hand       Augustin Weems will engage in back and forth play/turn taking with therapist support as needed in order to increase her success with playing with family and peers  8/16: Catie Watt demonstrated nice turn taking with play cecy today  Therapist would say/sign "my turn" while putting hand out, Catie Watt would hand therapist play cecy for a bear to be hidden inside, and would then say/sign "my turn" with prompting in order to get the play cecy back  Catie Watt would also respond "yep" when asked, "Can I have a turn?"  8/23: Excellent circles of play and turn taking demonstrated today  Catie Watt followed prompting to sign/say "my turn" and handed play cecy back to therapist when prompted  8/26: Catie Watt engaged in turn taking and reciprocal play by rolling ball back and forth with therapists today  8/30: Catie Watt completed circles of play with nice joint attention however turn taking not addressed today   9/2: Catie Watt was successful with turn taking with play cecy  When prompted, she handed play cecy back to therapist  Additionally, she said/signed "my turn" following modeling  9/9: not addressed in session   9/13: inconsistently handed therapist a toy when therapist verbalized "my turn" however did not actively engage in turn taking or circles of play  9/16: Catie Watt engaged in turn taking x1 and handed ball popper to therapist when asked to  9/20: nice circles of play and joint attention with singing Head, Shoulders, Knees and toes today  9/27: minimal participation in turn taking today  Therapist consistently used "my turn" in order for Aria to hand play cecy back to therapist      Catie Watt will visually attend to a book and use her index finger to point at pictures with hand over hand assistance as needed  8/16: successful visual attention to [employer] book today  Paulajuli Watt was able to match picture cards to pages in book and was also successful with pointing to eyes, nose and mouth on teacher's page  8/23: successful visual attention to [employer] book today   Paulajuli Shukri was able to match picture cards to pages in book and was also successful with pointing to eyes, nose and mouth on teacher's page  This was a familiar activity that was previously presented in last therapy session so Hai Baez successfully understood the routine and expectation  8/26: independently pointed to 1 picture in book  Hand over hand all other opportunities  Overall decreased participation with book today  8/30: Hai Baez was successful with color and shape identification with toys today however a book was not used in session  She did use index finger x1 to point at puzzle piece of de  9/2Ayemukund Danesilvia presented with initial self-directed behaviors to books as she prefers to flip quickly through the books  She pointed to pizza and ball when prompted and accepted hand over hand to point to several other pictures  9/9: self-directed with interactive seek and slide book with increased frustration as therapist attempted to lead activity  Hai Baez accepted hand over hand to follow directions in touching parts of a picture (ie  Slide, sun, flower)  9/13: resisted participation in book today despite it being an interactive sound book  9/16: Hai Baez required maximal support to attend to brown bear book without rushing to the end  She was successful with choosing matching picture card from field of 2 for each page and pointed to several body parts on teacher as well as several animals when prompted  9/20: Hai Baez pointed to eyes, ears, mouth and nose of teacher in brown bear book  Decreased participation when asked to point to sun, grass, slide, girl and flower in another book  9/27: unable to address today due to deficits in attention and participation     Parent will be compliant with home sensory program in order to meet Aria's sensory needs        Assessment: Hai Baez is a 1year old female receiving skilled OT services for deficits in sensory processing, attention to task and functional play skills  Hai Baez continues to work on development of functional play skills, direction following and attention to task  Increased level of arousal with deficits in atten She is also following verbal directions more consistently and is not showing signs of frustration  Improvements noted with overall functional play skills, task completion and attention to task  It is recommended that Katheryn continue OT 2x/week per plan of care  Plan: Continue OT 2x/week for 12 weeks

## 2021-10-01 LAB — BACTERIA SPEC CULT: ABNORMAL

## 2021-10-06 ENCOUNTER — APPOINTMENT (OUTPATIENT)
Dept: OTOLARYNGOLOGY | Facility: CLINIC | Age: 56
End: 2021-10-06
Payer: MEDICAID

## 2021-10-06 VITALS
BODY MASS INDEX: 26.66 KG/M2 | WEIGHT: 160 LBS | HEART RATE: 89 BPM | HEIGHT: 65 IN | SYSTOLIC BLOOD PRESSURE: 133 MMHG | TEMPERATURE: 97.5 F | DIASTOLIC BLOOD PRESSURE: 82 MMHG

## 2021-10-06 PROCEDURE — 92567 TYMPANOMETRY: CPT

## 2021-10-06 PROCEDURE — 92557 COMPREHENSIVE HEARING TEST: CPT

## 2021-10-06 PROCEDURE — 99213 OFFICE O/P EST LOW 20 MIN: CPT | Mod: 25

## 2021-10-06 NOTE — PROCEDURE
[Risk and Benefits Discussed] : The purpose, risks, discomforts, benefits and alternatives of the procedure have been explained to the patient including no treatment. [] : TM Perforation

## 2021-10-07 NOTE — PHYSICAL EXAM
[Nasal Endoscopy Performed] : nasal endoscopy was performed, see procedure section for findings [] : septum deviated bilaterally [Midline] : trachea located in midline position [Normal] : the left mastoid was normal [de-identified] : b/l perf, clean and dry  [de-identified] : no focal deficits aside from hearing loss [Hearing Loss Right Only] : normal [Hearing Loss Left Only] : normal [de-identified] : TM perf  clean and dry  [de-identified] : TM perf clean and dry  [de-identified] : wnl\par no facial weakness noted

## 2021-10-07 NOTE — HISTORY OF PRESENT ILLNESS
[No] : patient does not have a  history of radiation therapy [Hearing Loss] : hearing loss [Ear Fullness] : ear fullness [Otalgia] : otalgia [Diabetes] : diabetes [None] : No risk factors have been identified. [de-identified] : Patient with hx of DM and  Left Malignant OE treated with IV abx with PICC line presents s/p bilateral otorrhea. States he is doing well, no ear drainage since last visit. No other modifying factors.\par \par \par \par \par \par  [Vertigo] : no vertigo [Eustachian Tube Dysfunction] : no eustachian tube dysfunction [Cholesteatoma] : no cholesteatoma [Early Onset Hearing Loss] : no early onset hearing loss [Stroke] : no stroke [Nasal Congestion] : no nasal congestion [Allergic Rhinitis] : no allergic rhinitis [Asthma] : no asthma [Environmental Allergies] : no environmental allergies [Seasonal Allergies] : no seasonal allergies [Environmental Allergens] : no environmental allergens [Adenoidectomy] : no adenoidectomy [Hyperthyroidism] : no hyperthyroidism [Sialadenitis] : no sialadenitis [Hodgkin Disease] : no hodgkin disease [Non-Hodgkin Lymphoma] : no non-hodgkin lymphoma [Graves Disease] : no graves disease [Thyroid Cancer] : no thyroid cancer

## 2021-10-07 NOTE — DATA REVIEWED
[de-identified] : Hearing Test performed to evaluate the extent of hearing loss and  to explain pt's symptoms\par today's hearing loss was personally reviewed and revealed\par bilateral mixed hearing loss [de-identified] : personally reviewed and MRI reveals left-sided scleral base inflammation and nasopharyngeal fullness

## 2021-12-06 NOTE — ED CDU PROVIDER INITIAL DAY NOTE - NS_OBSORDERDATE_ED_A_ED
Reviewed record in preparation for visit and have obtained necessary documentation. Identified pt with two pt identifiers(name and ). Chief Complaint   Patient presents with    Physical     Blood pressure 132/85, pulse 74, temperature 98.3 °F (36.8 °C), temperature source Temporal, resp. rate 14, height 6' 1\" (1.854 m), weight 286 lb (129.7 kg), SpO2 98 %. Health Maintenance Due   Topic Date Due    Eye Exam  Never done    DTaP/Tdap/Td  (1 - Tdap) Never done    COVID-19 Vaccine (3 - Booster for Pfizer series) 2021    Shingles Vaccine (2 of 2) 10/05/2021       Mr. Christnia Hairston has a reminder for a \"due or due soon\" health maintenance. I have asked that he discuss this further with his primary care provider for follow-up on this health maintenance. Coordination of Care Questionnaire:  :     1) Have you been to an emergency room, urgent care clinic since your last visit? no   Hospitalized since your last visit? no             2) Have you seen or consulted any other health care providers outside of [address] [place] [address] since your last visit? yes  (Include any pap smears or colon screenings in this section.)    3) In the event something were to happen to you and you were unable to speak on your behalf, do you have an Advance Directive/ Living Will in place stating your wishes?  NO 05-Feb-2021 21:33

## 2022-02-07 ENCOUNTER — APPOINTMENT (OUTPATIENT)
Dept: OTOLARYNGOLOGY | Facility: CLINIC | Age: 57
End: 2022-02-07

## 2022-05-16 ENCOUNTER — APPOINTMENT (OUTPATIENT)
Dept: OTOLARYNGOLOGY | Facility: CLINIC | Age: 57
End: 2022-05-16
Payer: MEDICAID

## 2022-05-16 VITALS
SYSTOLIC BLOOD PRESSURE: 109 MMHG | WEIGHT: 155 LBS | DIASTOLIC BLOOD PRESSURE: 71 MMHG | HEIGHT: 65 IN | HEART RATE: 98 BPM | BODY MASS INDEX: 25.83 KG/M2 | TEMPERATURE: 98.2 F

## 2022-05-16 PROCEDURE — 99214 OFFICE O/P EST MOD 30 MIN: CPT | Mod: 25

## 2022-05-16 RX ORDER — LISINOPRIL 10 MG/1
10 TABLET ORAL
Qty: 90 | Refills: 0 | Status: ACTIVE | COMMUNITY
Start: 2022-01-02

## 2022-05-16 NOTE — DATA REVIEWED
[de-identified] : Hearing Test performed to evaluate the extent of hearing loss and  to explain pt's symptoms\par today's hearing loss was personally reviewed and revealed\par bilateral mixed hearing loss [de-identified] : personally reviewed and MRI reveals left-sided scleral base inflammation and nasopharyngeal fullness

## 2022-05-16 NOTE — HISTORY OF PRESENT ILLNESS
[No] : patient does not have a  history of radiation therapy [Hearing Loss] : hearing loss [Ear Fullness] : ear fullness [Otalgia] : otalgia [Diabetes] : diabetes [None] : No risk factors have been identified. [de-identified] : Patient with hx of DM and  Left Malignant OE treated with IV abx with PICC line presents for follow up. States he is doing well, no ear drainage since last visit. He recently got hearing aids where's them when he wants to.  No other modifying factors.\par \par \par \par \par \par  [Vertigo] : no vertigo [Eustachian Tube Dysfunction] : no eustachian tube dysfunction [Cholesteatoma] : no cholesteatoma [Early Onset Hearing Loss] : no early onset hearing loss [Stroke] : no stroke [Nasal Congestion] : no nasal congestion [Allergic Rhinitis] : no allergic rhinitis [Asthma] : no asthma [Environmental Allergies] : no environmental allergies [Seasonal Allergies] : no seasonal allergies [Environmental Allergens] : no environmental allergens [Adenoidectomy] : no adenoidectomy [Hyperthyroidism] : no hyperthyroidism [Sialadenitis] : no sialadenitis [Hodgkin Disease] : no hodgkin disease [Non-Hodgkin Lymphoma] : no non-hodgkin lymphoma [Graves Disease] : no graves disease [Thyroid Cancer] : no thyroid cancer

## 2022-05-16 NOTE — PHYSICAL EXAM
[] : septum deviated bilaterally [Midline] : trachea located in midline position [Normal] : the left mastoid was normal [de-identified] : left ear wax  [de-identified] : b/l perf, clean and dry  [de-identified] : no focal deficits aside from hearing loss [Hearing Loss Right Only] : normal [Hearing Loss Left Only] : normal [de-identified] : TM perf  clean and dry  [de-identified] : TM perf clean and dry  [de-identified] : wnl\par no facial weakness noted

## 2022-05-16 NOTE — PROCEDURE
[Risk and Benefits Discussed] : The purpose, risks, discomforts, benefits and alternatives of the procedure have been explained to the patient including no treatment. [] : TM Perforation [FreeTextEntry3] : After informed verbal consent is obtained, cerumen is removed from the left ear canal with a curette and suction.\par

## 2022-05-16 NOTE — END OF VISIT
[Time Spent: ___ minutes] : I have spent [unfilled] minutes of time on the encounter. [FreeTextEntry3] : I personally saw and examined RAY FISHCER in detail. I spoke to NNEKA Castellanos regarding the assessment and plan of care. I reviewed the above assessment and plan of care, and agree. I have made changes in changes in the body of the note where appropriate.I personally reviewed the HPI, PMH, FH, SH, ROS and medications/allergies. I have spoken to NNEKA Castellanos regarding the history and have personally determined the assessment and plan of care, and documented this myself. I was present and participated in all key portions of the encounter and all procedures noted above. I have made changes in the body of the note where appropriate.\par \par Attesting Faculty: See Attending Signature Below \par \par \par

## 2022-05-16 NOTE — ASSESSMENT
[FreeTextEntry1] : Patient with hx of DM and  Malignant OE treated with IV abx with PICC line presents for routine follow up. States he is doing well, no ear pain or drainage. \par \par Bilateral SNHL:\par -cleared for hearing aids, vented hearing aids \par \par Wax\par -Cerumen is removed from the right and left  ear canal with a curette and suction.\par -Routine debridement suggested to keep the ears free of wax.\par \par DNS and Rhinitis\par cont nasal spray as needed-flonase-f/u 6 months or prn \par \par GARETH-resolved\par \par f/u 6 months\par \par \par \par \par

## 2022-08-02 NOTE — ED PROVIDER NOTE - PHYSICAL EXAMINATION
A1c order sent to provider for cosigning.    GEN - NAD; non-toxic; A+Ox3, speaking full sentences, steady gait   HENT - NC/AT, No visible Ecchymosis, No Abrasions, No Lac/Tears, (+) B/L Perforated TM's w/trace Erythema; No evidence of purulent discharge or mastoid tenderness B/L  EYES - EOMI, no conjunctival pallor, no scleral icterus  NECK - Neck supple, No LAD, No Swelling  PULM - CTA B/L,  symmetric breath sounds  CV -  RRR, S1 S2, no murmurs 2+ Pulses B/L UE  GI - NT/ND, soft, no guarding, no rebound, no masses    MSK/EXT- no CVA tenderness; no edema, no gross deformity, warm and well perfused, no calf tenderness/swelling/erythema   SKIN - (+) RUE w/demonstration cellulitic changes and purulent discharge, 2+ Pulses in B/L UE  NEUROLOGIC - alert, CN2-12 intact, sensation intact, moving all 4 ext with 5/5 Strength

## 2023-05-30 ENCOUNTER — RX RENEWAL (OUTPATIENT)
Age: 58
End: 2023-05-30

## 2023-08-14 ENCOUNTER — APPOINTMENT (OUTPATIENT)
Dept: OTOLARYNGOLOGY | Facility: CLINIC | Age: 58
End: 2023-08-14
Payer: MEDICAID

## 2023-08-14 VITALS
WEIGHT: 150 LBS | HEIGHT: 65 IN | BODY MASS INDEX: 24.99 KG/M2 | SYSTOLIC BLOOD PRESSURE: 124 MMHG | HEART RATE: 95 BPM | DIASTOLIC BLOOD PRESSURE: 74 MMHG | TEMPERATURE: 97.7 F

## 2023-08-14 DIAGNOSIS — J34.2 DEVIATED NASAL SEPTUM: ICD-10-CM

## 2023-08-14 PROCEDURE — 99214 OFFICE O/P EST MOD 30 MIN: CPT

## 2023-08-14 RX ORDER — OFLOXACIN OTIC 3 MG/ML
0.3 SOLUTION AURICULAR (OTIC)
Qty: 1 | Refills: 5 | Status: ACTIVE | COMMUNITY
Start: 2023-08-14 | End: 1900-01-01

## 2023-08-14 NOTE — PHYSICAL EXAM
[de-identified] : b/l perf, clean and dry  [] : septum deviated bilaterally [Midline] : trachea located in midline position [de-identified] : no focal deficits aside from hearing loss [Normal] : the left mastoid was normal [Hearing Loss Right Only] : normal [Hearing Loss Left Only] : normal [de-identified] : TM perf  clean and dry  [de-identified] : TM perf clean and dry  [de-identified] : wnl\par  no facial weakness noted

## 2023-08-14 NOTE — ASSESSMENT
[FreeTextEntry1] : Patient with hx of DM and  Malignant OE treated with IV abx with PICC line presents for routine follow up. States he is doing well, no ear pain or drainage.   Bilateral SNHL and TM perf's -cleared for hearing aids, vented hearing aids  Rec consider tympanoplasty--referred to [facility]  Wax -Cerumen is removed from the right and left  ear canal with a curette and suction. -Routine debridement suggested to keep the ears free of wax.  DNS and Rhinitis cont nasal spray as needed-flonase-f/u 6 months or prn   GARETH-resolved  f/u 6 months

## 2023-08-14 NOTE — DATA REVIEWED
[de-identified] : Hearing Test performed to evaluate the extent of hearing loss and  to explain pt's symptoms\par  today's hearing loss was personally reviewed and revealed\par  bilateral mixed hearing loss [de-identified] : personally reviewed and MRI reveals left-sided scleral base inflammation and nasopharyngeal fullness

## 2023-08-14 NOTE — HISTORY OF PRESENT ILLNESS
[No] : patient does not have a  history of radiation therapy [de-identified] : 59 yo Patient with hx of DM and  Left Malignant OE treated with IV abx with PICC line presents for follow up. States he is doing well, no ear drainage since last visit. He recently got hearing aids where's them when he wants to.  No other modifying factors.       [Hearing Loss] : hearing loss [Ear Fullness] : ear fullness [Vertigo] : no vertigo [Otalgia] : otalgia [Eustachian Tube Dysfunction] : no eustachian tube dysfunction [Diabetes] : diabetes [Cholesteatoma] : no cholesteatoma [Early Onset Hearing Loss] : no early onset hearing loss [Stroke] : no stroke [Nasal Congestion] : no nasal congestion [Allergic Rhinitis] : no allergic rhinitis [Asthma] : no asthma [Environmental Allergies] : no environmental allergies [Seasonal Allergies] : no seasonal allergies [Environmental Allergens] : no environmental allergens [Adenoidectomy] : no adenoidectomy [Hyperthyroidism] : no hyperthyroidism [Sialadenitis] : no sialadenitis [Hodgkin Disease] : no hodgkin disease [Non-Hodgkin Lymphoma] : no non-hodgkin lymphoma [None] : No risk factors have been identified. [Graves Disease] : no graves disease [Thyroid Cancer] : no thyroid cancer

## 2023-10-06 ENCOUNTER — APPOINTMENT (OUTPATIENT)
Dept: OTOLARYNGOLOGY | Facility: CLINIC | Age: 58
End: 2023-10-06
Payer: MEDICAID

## 2023-10-06 VITALS
HEIGHT: 65 IN | SYSTOLIC BLOOD PRESSURE: 123 MMHG | RESPIRATION RATE: 17 BRPM | DIASTOLIC BLOOD PRESSURE: 76 MMHG | OXYGEN SATURATION: 100 % | WEIGHT: 150 LBS | BODY MASS INDEX: 24.99 KG/M2 | HEART RATE: 92 BPM

## 2023-10-06 DIAGNOSIS — H72.01 CENTRAL PERFORATION OF TYMPANIC MEMBRANE, RIGHT EAR: ICD-10-CM

## 2023-10-06 PROCEDURE — 92504 EAR MICROSCOPY EXAMINATION: CPT

## 2023-10-06 PROCEDURE — 99214 OFFICE O/P EST MOD 30 MIN: CPT | Mod: 25

## 2023-10-06 RX ORDER — AMOXICILLIN AND CLAVULANATE POTASSIUM 875; 125 MG/1; MG/1
875-125 TABLET, COATED ORAL
Qty: 20 | Refills: 0 | Status: COMPLETED | COMMUNITY
Start: 2021-01-23 | End: 2023-10-06

## 2023-10-06 RX ORDER — SULFAMETHOXAZOLE AND TRIMETHOPRIM 800; 160 MG/1; MG/1
800-160 TABLET ORAL
Qty: 10 | Refills: 0 | Status: COMPLETED | COMMUNITY
Start: 2021-03-22 | End: 2023-10-06

## 2023-10-06 RX ORDER — AZELASTINE HYDROCHLORIDE 137 UG/1
0.1 SPRAY, METERED NASAL DAILY
Qty: 1 | Refills: 0 | Status: COMPLETED | COMMUNITY
Start: 2021-08-20 | End: 2023-10-06

## 2023-10-06 RX ORDER — OFLOXACIN OTIC 3 MG/ML
0.3 SOLUTION AURICULAR (OTIC)
Qty: 1 | Refills: 1 | Status: ACTIVE | COMMUNITY
Start: 2023-10-06 | End: 1900-01-01

## 2023-10-06 RX ORDER — AZELASTINE HYDROCHLORIDE 137 UG/1
0.1 SPRAY, METERED NASAL DAILY
Qty: 2 | Refills: 5 | Status: COMPLETED | COMMUNITY
Start: 2021-08-30 | End: 2023-10-06

## 2023-10-06 RX ORDER — MELOXICAM 15 MG/1
15 TABLET ORAL
Qty: 14 | Refills: 0 | Status: COMPLETED | COMMUNITY
Start: 2021-02-13 | End: 2023-10-06

## 2023-10-06 RX ORDER — BLOOD PRESSURE TEST KIT-LARGE
KIT MISCELLANEOUS
Qty: 1 | Refills: 0 | Status: COMPLETED | COMMUNITY
Start: 2021-01-04 | End: 2023-10-06

## 2023-10-06 RX ORDER — KETOROLAC TROMETHAMINE 10 MG/1
10 TABLET, FILM COATED ORAL
Qty: 20 | Refills: 0 | Status: COMPLETED | COMMUNITY
Start: 2021-03-29 | End: 2023-10-06

## 2023-10-06 RX ORDER — 70%ISOPROPYL ALCOHOL 0.7 ML/ML
70 SWAB TOPICAL
Qty: 100 | Refills: 0 | Status: COMPLETED | COMMUNITY
Start: 2020-11-11 | End: 2023-10-06

## 2023-10-06 RX ORDER — LANCETS
EACH MISCELLANEOUS
Qty: 100 | Refills: 0 | Status: COMPLETED | COMMUNITY
Start: 2021-03-17 | End: 2023-10-06

## 2023-10-06 RX ORDER — LEVOFLOXACIN 750 MG/1
750 TABLET, FILM COATED ORAL
Qty: 6 | Refills: 0 | Status: COMPLETED | COMMUNITY
Start: 2021-03-22 | End: 2023-10-06

## 2023-10-06 RX ORDER — CIPROFLOXACIN AND DEXAMETHASONE 3; 1 MG/ML; MG/ML
0.3-0.1 SUSPENSION/ DROPS AURICULAR (OTIC)
Qty: 2 | Refills: 5 | Status: COMPLETED | COMMUNITY
Start: 2021-08-16 | End: 2023-10-06

## 2023-10-06 RX ORDER — BLOOD SUGAR DIAGNOSTIC
STRIP MISCELLANEOUS
Qty: 100 | Refills: 0 | Status: COMPLETED | COMMUNITY
Start: 2021-03-17 | End: 2023-10-06

## 2023-10-06 RX ORDER — OFLOXACIN OTIC 3 MG/ML
0.3 SOLUTION AURICULAR (OTIC) TWICE DAILY
Qty: 3 | Refills: 0 | Status: COMPLETED | COMMUNITY
Start: 2021-03-04 | End: 2023-10-06

## 2023-10-06 RX ORDER — FLUTICASONE PROPIONATE 50 UG/1
50 SPRAY, METERED NASAL
Qty: 16 | Refills: 11 | Status: COMPLETED | COMMUNITY
Start: 2021-10-06 | End: 2023-10-06

## 2023-10-06 RX ORDER — FEXOFENADINE HYDROCHLORIDE 180 MG/1
180 TABLET ORAL
Qty: 30 | Refills: 0 | Status: COMPLETED | COMMUNITY
Start: 2021-09-18 | End: 2023-10-06

## 2023-10-06 RX ORDER — LISINOPRIL 5 MG/1
5 TABLET ORAL
Qty: 180 | Refills: 0 | Status: COMPLETED | COMMUNITY
Start: 2021-04-03 | End: 2023-10-06

## 2023-10-12 ENCOUNTER — OUTPATIENT (OUTPATIENT)
Dept: OUTPATIENT SERVICES | Facility: HOSPITAL | Age: 58
LOS: 1 days | End: 2023-10-12
Payer: MEDICAID

## 2023-10-12 ENCOUNTER — APPOINTMENT (OUTPATIENT)
Dept: CT IMAGING | Facility: IMAGING CENTER | Age: 58
End: 2023-10-12
Payer: MEDICAID

## 2023-10-12 DIAGNOSIS — H72.03 CENTRAL PERFORATION OF TYMPANIC MEMBRANE, BILATERAL: ICD-10-CM

## 2023-10-12 PROCEDURE — 70480 CT ORBIT/EAR/FOSSA W/O DYE: CPT

## 2023-10-12 PROCEDURE — 70480 CT ORBIT/EAR/FOSSA W/O DYE: CPT | Mod: 26

## 2023-10-19 ENCOUNTER — TRANSCRIPTION ENCOUNTER (OUTPATIENT)
Age: 58
End: 2023-10-19

## 2023-11-09 ENCOUNTER — APPOINTMENT (OUTPATIENT)
Dept: OTOLARYNGOLOGY | Facility: CLINIC | Age: 58
End: 2023-11-09
Payer: MEDICAID

## 2023-11-09 VITALS
WEIGHT: 152 LBS | BODY MASS INDEX: 25.33 KG/M2 | DIASTOLIC BLOOD PRESSURE: 75 MMHG | HEIGHT: 65 IN | HEART RATE: 91 BPM | SYSTOLIC BLOOD PRESSURE: 117 MMHG

## 2023-11-09 DIAGNOSIS — H92.12 OTORRHEA, LEFT EAR: ICD-10-CM

## 2023-11-09 DIAGNOSIS — H92.11 OTORRHEA, RIGHT EAR: ICD-10-CM

## 2023-11-09 PROCEDURE — 92557 COMPREHENSIVE HEARING TEST: CPT

## 2023-11-09 PROCEDURE — 99214 OFFICE O/P EST MOD 30 MIN: CPT | Mod: 25

## 2023-11-09 PROCEDURE — 92567 TYMPANOMETRY: CPT

## 2023-11-09 PROCEDURE — 92504 EAR MICROSCOPY EXAMINATION: CPT

## 2023-11-09 RX ORDER — FLUTICASONE PROPIONATE 50 UG/1
50 SPRAY, METERED NASAL DAILY
Qty: 1 | Refills: 3 | Status: ACTIVE | COMMUNITY
Start: 2023-11-09 | End: 1900-01-01

## 2024-01-31 ENCOUNTER — APPOINTMENT (OUTPATIENT)
Dept: OTOLARYNGOLOGY | Facility: CLINIC | Age: 59
End: 2024-01-31

## 2024-05-01 ENCOUNTER — APPOINTMENT (OUTPATIENT)
Dept: OTOLARYNGOLOGY | Facility: CLINIC | Age: 59
End: 2024-05-01
Payer: MEDICAID

## 2024-05-01 VITALS
DIASTOLIC BLOOD PRESSURE: 72 MMHG | SYSTOLIC BLOOD PRESSURE: 119 MMHG | HEART RATE: 87 BPM | HEIGHT: 65 IN | WEIGHT: 155 LBS | BODY MASS INDEX: 25.83 KG/M2

## 2024-05-01 DIAGNOSIS — H72.02 CENTRAL PERFORATION OF TYMPANIC MEMBRANE, LEFT EAR: ICD-10-CM

## 2024-05-01 PROCEDURE — 31231 NASAL ENDOSCOPY DX: CPT

## 2024-05-01 PROCEDURE — 99214 OFFICE O/P EST MOD 30 MIN: CPT | Mod: 25

## 2024-05-01 PROCEDURE — 92504 EAR MICROSCOPY EXAMINATION: CPT

## 2024-05-01 RX ORDER — OFLOXACIN OTIC 3 MG/ML
0.3 SOLUTION AURICULAR (OTIC) TWICE DAILY
Qty: 1 | Refills: 1 | Status: ACTIVE | COMMUNITY
Start: 2023-11-09 | End: 1900-01-01

## 2024-05-01 RX ORDER — FLUTICASONE PROPIONATE 50 UG/1
50 SPRAY, METERED NASAL DAILY
Qty: 1 | Refills: 10 | Status: ACTIVE | COMMUNITY
Start: 2023-08-14 | End: 1900-01-01

## 2024-05-01 RX ORDER — AZELASTINE HYDROCHLORIDE 137 UG/1
137 SPRAY, METERED NASAL
Qty: 1 | Refills: 3 | Status: ACTIVE | COMMUNITY
Start: 2023-11-09 | End: 1900-01-01

## 2024-05-01 NOTE — PROCEDURE
[FreeTextEntry3] : Procedure note:  Binocular microscopy  May 01, 2024   Inspection of the ears was performed under binocular microscopy because of need to accurately visualize otologic landmarks and potential pathologic conditions that would not otherwise be visible through simple otoscopic exam. [FreeTextEntry6] : Procedure note: Nasal endoscopy  Description of Procedure:  Nasal endoscopy was performed because of recalcitrant symptoms of nasal obstruction and/or chronic rhinitis, and anterior anatomic abnormalities precluding visualization. Using a flexible endoscope with sheath, the nasal mucosa, septum, turbinates, and ostiomeatal complex were examined.    Nasal mucosa findings:  the nasal mucosa was edematous. Septum findings:  the septum showed no abnormalities. Nasopharynx findings:  the nasopharynx was normal. Middle meatus findings:  the middle meatus had no abnormalities. Sinuses findings:  the paranasal sinuses had no abnormalities.

## 2024-05-01 NOTE — ASSESSMENT
[FreeTextEntry1] : Reports minor left ear drainage since last visit.  Exam today shows discharge in left middle ear which was suctioned, otherwise stable perforation.  Right ear middle ear space appears dry, perforation stable.  Also with chronic nasal congestion which is stable.  Rx Floxin drops left ear, provided refills on Astelin/Flonase.  Follow-up 4 to 6 weeks to ensure improvement in drainage.  Patient also plans to send recent upcoming blood work including hemoglobin A1c levels to our office for review, counseled him that this level would need to be below 7 to consider right ear surgery.

## 2024-06-12 ENCOUNTER — NON-APPOINTMENT (OUTPATIENT)
Age: 59
End: 2024-06-12

## 2024-06-13 ENCOUNTER — APPOINTMENT (OUTPATIENT)
Dept: OTOLARYNGOLOGY | Facility: CLINIC | Age: 59
End: 2024-06-13
Payer: MEDICAID

## 2024-06-13 VITALS
BODY MASS INDEX: 25.83 KG/M2 | WEIGHT: 155 LBS | SYSTOLIC BLOOD PRESSURE: 120 MMHG | HEART RATE: 92 BPM | DIASTOLIC BLOOD PRESSURE: 71 MMHG | HEIGHT: 65 IN

## 2024-06-13 DIAGNOSIS — H61.23 IMPACTED CERUMEN, BILATERAL: ICD-10-CM

## 2024-06-13 DIAGNOSIS — H90.6 MIXED CONDUCTIVE AND SENSORINEURAL HEARING LOSS, BILATERAL: ICD-10-CM

## 2024-06-13 DIAGNOSIS — H72.03 CENTRAL PERFORATION OF TYMPANIC MEMBRANE, BILATERAL: ICD-10-CM

## 2024-06-13 DIAGNOSIS — H92.13 OTORRHEA, BILATERAL: ICD-10-CM

## 2024-06-13 DIAGNOSIS — H60.22 MALIGNANT OTITIS EXTERNA, LEFT EAR: ICD-10-CM

## 2024-06-13 PROCEDURE — 99213 OFFICE O/P EST LOW 20 MIN: CPT | Mod: 25

## 2024-06-13 PROCEDURE — 92504 EAR MICROSCOPY EXAMINATION: CPT

## 2024-06-13 NOTE — HISTORY OF PRESENT ILLNESS
[de-identified] : 59 year old man, 1 month follow up for Left otorrhea History of bilateral TM perforation - chronic nasal congestion Prescribed Ofloxacin drops - renewed nasal spray Astelin/Flonase Blood work monitored - recent labs to be sent to office, pending - due for additional blood work Discussed surgical options if patient is a candidate  States intermittent yellow otorrhea from Left ear - continues to use drops and nasal spray as prescribed Reports no changes with hearing Denies otalgia, tinnitus, dizziness, vertigo, headaches related to ears or recent fevers

## 2024-06-13 NOTE — PROCEDURE
[FreeTextEntry3] : Procedure note:  Binocular microscopy  Jun 13, 2024   Inspection of the ears was performed under binocular microscopy because of need to accurately visualize otologic landmarks and potential pathologic conditions that would not otherwise be visible through simple otoscopic exam.

## 2024-06-13 NOTE — ASSESSMENT
[FreeTextEntry1] : Exam today shows dry subtotal \perforation, left ear with scant mucous in middle ear space, no active drainage.  Hemoglobin A1c ordered, if less than 7 consider surgical repair of right tympanic membrane; if elevated, follow-up 3 to 4 months to monitor.  Maintain dry ear precautions bilaterally.